# Patient Record
Sex: FEMALE | Race: WHITE | NOT HISPANIC OR LATINO | Employment: FULL TIME | URBAN - METROPOLITAN AREA
[De-identification: names, ages, dates, MRNs, and addresses within clinical notes are randomized per-mention and may not be internally consistent; named-entity substitution may affect disease eponyms.]

---

## 2017-01-10 ENCOUNTER — ALLSCRIPTS OFFICE VISIT (OUTPATIENT)
Dept: OTHER | Facility: OTHER | Age: 22
End: 2017-01-10

## 2017-01-10 ENCOUNTER — LAB REQUISITION (OUTPATIENT)
Dept: LAB | Facility: HOSPITAL | Age: 22
End: 2017-01-10
Payer: COMMERCIAL

## 2017-01-10 DIAGNOSIS — R87.612 LOW GRADE SQUAMOUS INTRAEPITHELIAL LESION ON CYTOLOGIC SMEAR OF CERVIX (LGSIL): ICD-10-CM

## 2017-01-10 PROCEDURE — 87624 HPV HI-RISK TYP POOLED RSLT: CPT | Performed by: OBSTETRICS & GYNECOLOGY

## 2017-01-10 PROCEDURE — 87623 HPV LOW-RISK TYPES: CPT | Performed by: OBSTETRICS & GYNECOLOGY

## 2017-01-11 LAB
HPV I/H RISK 1 DNA CVX QL PROBE+SIG AMP: POSITIVE
HPV LOW RISK DNA CVX QL PROBE+SIG AMP: NEGATIVE

## 2017-01-13 ENCOUNTER — GENERIC CONVERSION - ENCOUNTER (OUTPATIENT)
Dept: OTHER | Facility: OTHER | Age: 22
End: 2017-01-13

## 2017-04-14 ENCOUNTER — OFFICE VISIT (OUTPATIENT)
Dept: URGENT CARE | Facility: MEDICAL CENTER | Age: 22
End: 2017-04-14
Payer: COMMERCIAL

## 2017-04-14 PROCEDURE — 99213 OFFICE O/P EST LOW 20 MIN: CPT

## 2017-08-08 ENCOUNTER — LAB REQUISITION (OUTPATIENT)
Dept: LAB | Facility: HOSPITAL | Age: 22
End: 2017-08-08
Payer: COMMERCIAL

## 2017-08-08 ENCOUNTER — ALLSCRIPTS OFFICE VISIT (OUTPATIENT)
Dept: OTHER | Facility: OTHER | Age: 22
End: 2017-08-08

## 2017-08-08 DIAGNOSIS — Z32.01 ENCOUNTER FOR PREGNANCY TEST, RESULT POSITIVE: ICD-10-CM

## 2017-08-08 LAB
ABO GROUP BLD: NORMAL
B-HCG SERPL-ACNC: ABNORMAL MIU/ML
BLD GP AB SCN SERPL QL: NEGATIVE
PROGEST SERPL-MCNC: 12.5 NG/ML
RH BLD: POSITIVE
SPECIMEN EXPIRATION DATE: NORMAL

## 2017-08-08 PROCEDURE — 86850 RBC ANTIBODY SCREEN: CPT | Performed by: OBSTETRICS & GYNECOLOGY

## 2017-08-08 PROCEDURE — 86901 BLOOD TYPING SEROLOGIC RH(D): CPT | Performed by: OBSTETRICS & GYNECOLOGY

## 2017-08-08 PROCEDURE — 84144 ASSAY OF PROGESTERONE: CPT | Performed by: OBSTETRICS & GYNECOLOGY

## 2017-08-08 PROCEDURE — 86900 BLOOD TYPING SEROLOGIC ABO: CPT | Performed by: OBSTETRICS & GYNECOLOGY

## 2017-08-08 PROCEDURE — 84702 CHORIONIC GONADOTROPIN TEST: CPT | Performed by: OBSTETRICS & GYNECOLOGY

## 2017-08-09 ENCOUNTER — TRANSCRIBE ORDERS (OUTPATIENT)
Dept: ADMINISTRATIVE | Facility: HOSPITAL | Age: 22
End: 2017-08-09

## 2017-08-09 ENCOUNTER — GENERIC CONVERSION - ENCOUNTER (OUTPATIENT)
Dept: OTHER | Facility: OTHER | Age: 22
End: 2017-08-09

## 2017-08-09 DIAGNOSIS — O36.80X0 ULTRASOUND SCAN TO CONFIRM FETAL VIABILITY WITH HISTORY OF MISCARRIAGE: Primary | ICD-10-CM

## 2017-08-09 DIAGNOSIS — Z87.59 ULTRASOUND SCAN TO CONFIRM FETAL VIABILITY WITH HISTORY OF MISCARRIAGE: Primary | ICD-10-CM

## 2017-08-16 ENCOUNTER — HOSPITAL ENCOUNTER (OUTPATIENT)
Dept: ULTRASOUND IMAGING | Facility: MEDICAL CENTER | Age: 22
Discharge: HOME | End: 2017-08-16
Payer: COMMERCIAL

## 2017-08-16 DIAGNOSIS — O36.80X0 ULTRASOUND SCAN TO CONFIRM FETAL VIABILITY WITH HISTORY OF MISCARRIAGE: ICD-10-CM

## 2017-08-16 DIAGNOSIS — Z87.59 ULTRASOUND SCAN TO CONFIRM FETAL VIABILITY WITH HISTORY OF MISCARRIAGE: ICD-10-CM

## 2017-08-16 PROCEDURE — 76801 OB US < 14 WKS SINGLE FETUS: CPT

## 2017-08-21 ENCOUNTER — GENERIC CONVERSION - ENCOUNTER (OUTPATIENT)
Dept: OTHER | Facility: OTHER | Age: 22
End: 2017-08-21

## 2017-08-29 ENCOUNTER — LAB REQUISITION (OUTPATIENT)
Dept: LAB | Facility: HOSPITAL | Age: 22
End: 2017-08-29
Payer: COMMERCIAL

## 2017-08-29 DIAGNOSIS — Z34.91 ENCOUNTER FOR SUPERVISION OF NORMAL PREGNANCY IN FIRST TRIMESTER: ICD-10-CM

## 2017-08-29 LAB
ABO GROUP BLD: NORMAL
BASOPHILS # BLD AUTO: 0.02 THOUSANDS/ΜL (ref 0–0.1)
BASOPHILS NFR BLD AUTO: 0 % (ref 0–1)
BILIRUB UR QL STRIP: NEGATIVE
BLD GP AB SCN SERPL QL: NEGATIVE
CLARITY UR: CLEAR
COLOR UR: YELLOW
EOSINOPHIL # BLD AUTO: 0.15 THOUSAND/ΜL (ref 0–0.61)
EOSINOPHIL NFR BLD AUTO: 1 % (ref 0–6)
ERYTHROCYTE [DISTWIDTH] IN BLOOD BY AUTOMATED COUNT: 13.1 % (ref 11.6–15.1)
GLUCOSE UR STRIP-MCNC: NEGATIVE MG/DL
HBV SURFACE AG SER QL: NORMAL
HCT VFR BLD AUTO: 37.4 % (ref 34.8–46.1)
HGB BLD-MCNC: 12.3 G/DL (ref 11.5–15.4)
HGB UR QL STRIP.AUTO: NEGATIVE
KETONES UR STRIP-MCNC: NEGATIVE MG/DL
LEUKOCYTE ESTERASE UR QL STRIP: NEGATIVE
LYMPHOCYTES # BLD AUTO: 2.73 THOUSANDS/ΜL (ref 0.6–4.47)
LYMPHOCYTES NFR BLD AUTO: 26 % (ref 14–44)
MCH RBC QN AUTO: 30.8 PG (ref 26.8–34.3)
MCHC RBC AUTO-ENTMCNC: 32.9 G/DL (ref 31.4–37.4)
MCV RBC AUTO: 94 FL (ref 82–98)
MONOCYTES # BLD AUTO: 0.53 THOUSAND/ΜL (ref 0.17–1.22)
MONOCYTES NFR BLD AUTO: 5 % (ref 4–12)
NEUTROPHILS # BLD AUTO: 7 THOUSANDS/ΜL (ref 1.85–7.62)
NEUTS SEG NFR BLD AUTO: 68 % (ref 43–75)
NITRITE UR QL STRIP: NEGATIVE
NRBC BLD AUTO-RTO: 0 /100 WBCS
PH UR STRIP.AUTO: 7 [PH] (ref 4.5–8)
PLATELET # BLD AUTO: 240 THOUSANDS/UL (ref 149–390)
PMV BLD AUTO: 10.2 FL (ref 8.9–12.7)
PROT UR STRIP-MCNC: NEGATIVE MG/DL
RBC # BLD AUTO: 4 MILLION/UL (ref 3.81–5.12)
RH BLD: POSITIVE
RUBV IGG SERPL IA-ACNC: 87.5 IU/ML
SP GR UR STRIP.AUTO: 1.01 (ref 1–1.03)
SPECIMEN EXPIRATION DATE: NORMAL
UROBILINOGEN UR QL STRIP.AUTO: 0.2 E.U./DL
WBC # BLD AUTO: 10.45 THOUSAND/UL (ref 4.31–10.16)

## 2017-08-29 PROCEDURE — 87086 URINE CULTURE/COLONY COUNT: CPT | Performed by: NURSE PRACTITIONER

## 2017-08-29 PROCEDURE — 80081 OBSTETRIC PANEL INC HIV TSTG: CPT | Performed by: NURSE PRACTITIONER

## 2017-08-29 PROCEDURE — 81003 URINALYSIS AUTO W/O SCOPE: CPT | Performed by: NURSE PRACTITIONER

## 2017-08-30 LAB
BACTERIA UR CULT: NORMAL
HIV 1+2 AB+HIV1 P24 AG SERPL QL IA: NORMAL
RPR SER QL: NORMAL

## 2017-09-28 ENCOUNTER — GENERIC CONVERSION - ENCOUNTER (OUTPATIENT)
Dept: OTHER | Facility: OTHER | Age: 22
End: 2017-09-28

## 2017-09-28 PROCEDURE — 87624 HPV HI-RISK TYP POOLED RSLT: CPT | Performed by: OBSTETRICS & GYNECOLOGY

## 2017-09-28 PROCEDURE — G0145 SCR C/V CYTO,THINLAYER,RESCR: HCPCS | Performed by: OBSTETRICS & GYNECOLOGY

## 2017-09-29 ENCOUNTER — TRANSCRIBE ORDERS (OUTPATIENT)
Dept: ADMINISTRATIVE | Facility: HOSPITAL | Age: 22
End: 2017-09-29

## 2017-09-29 ENCOUNTER — LAB REQUISITION (OUTPATIENT)
Dept: LAB | Facility: HOSPITAL | Age: 22
End: 2017-09-29
Payer: COMMERCIAL

## 2017-09-29 DIAGNOSIS — Z34.91 ENCOUNTER FOR SUPERVISION OF NORMAL PREGNANCY IN FIRST TRIMESTER: ICD-10-CM

## 2017-09-29 DIAGNOSIS — R87.612 LOW GRADE SQUAMOUS INTRAEPITHELIAL LESION ON CYTOLOGIC SMEAR OF CERVIX (LGSIL): ICD-10-CM

## 2017-09-29 DIAGNOSIS — Z86.19 PERSONAL HISTORY OF OTHER INFECTIOUS AND PARASITIC DISEASES: ICD-10-CM

## 2017-09-29 PROCEDURE — 87591 N.GONORRHOEAE DNA AMP PROB: CPT | Performed by: OBSTETRICS & GYNECOLOGY

## 2017-09-29 PROCEDURE — 87491 CHLMYD TRACH DNA AMP PROBE: CPT | Performed by: OBSTETRICS & GYNECOLOGY

## 2017-10-05 LAB
CHLAMYDIA DNA CVX QL NAA+PROBE: NORMAL
N GONORRHOEA DNA GENITAL QL NAA+PROBE: NORMAL

## 2017-10-06 LAB — HPV RRNA GENITAL QL NAA+PROBE: NORMAL

## 2017-10-18 LAB
LAB AP GYN PRIMARY INTERPRETATION: NORMAL
Lab: NORMAL

## 2017-10-20 ENCOUNTER — GENERIC CONVERSION - ENCOUNTER (OUTPATIENT)
Dept: OTHER | Facility: OTHER | Age: 22
End: 2017-10-20

## 2017-11-02 ENCOUNTER — GENERIC CONVERSION - ENCOUNTER (OUTPATIENT)
Dept: OTHER | Facility: OTHER | Age: 22
End: 2017-11-02

## 2017-11-03 ENCOUNTER — GENERIC CONVERSION - ENCOUNTER (OUTPATIENT)
Dept: OTHER | Facility: OTHER | Age: 22
End: 2017-11-03

## 2017-11-06 ENCOUNTER — GENERIC CONVERSION - ENCOUNTER (OUTPATIENT)
Dept: OTHER | Facility: OTHER | Age: 22
End: 2017-11-06

## 2017-11-06 ENCOUNTER — ALLSCRIPTS OFFICE VISIT (OUTPATIENT)
Dept: PERINATAL CARE | Facility: CLINIC | Age: 22
End: 2017-11-06
Payer: COMMERCIAL

## 2017-11-06 PROCEDURE — 76817 TRANSVAGINAL US OBSTETRIC: CPT | Performed by: OBSTETRICS & GYNECOLOGY

## 2017-11-06 PROCEDURE — 76811 OB US DETAILED SNGL FETUS: CPT | Performed by: OBSTETRICS & GYNECOLOGY

## 2017-11-28 ENCOUNTER — GENERIC CONVERSION - ENCOUNTER (OUTPATIENT)
Dept: OTHER | Facility: OTHER | Age: 22
End: 2017-11-28

## 2017-11-28 ENCOUNTER — APPOINTMENT (OUTPATIENT)
Dept: PERINATAL CARE | Facility: CLINIC | Age: 22
End: 2017-11-28
Payer: COMMERCIAL

## 2017-11-28 PROCEDURE — 76816 OB US FOLLOW-UP PER FETUS: CPT | Performed by: OBSTETRICS & GYNECOLOGY

## 2017-11-29 ENCOUNTER — GENERIC CONVERSION - ENCOUNTER (OUTPATIENT)
Dept: OTHER | Facility: OTHER | Age: 22
End: 2017-11-29

## 2017-12-26 ENCOUNTER — LAB REQUISITION (OUTPATIENT)
Dept: LAB | Facility: HOSPITAL | Age: 22
End: 2017-12-26
Payer: COMMERCIAL

## 2017-12-26 ENCOUNTER — GENERIC CONVERSION - ENCOUNTER (OUTPATIENT)
Dept: OTHER | Facility: OTHER | Age: 22
End: 2017-12-26

## 2017-12-26 DIAGNOSIS — Z34.92 ENCOUNTER FOR SUPERVISION OF NORMAL PREGNANCY IN SECOND TRIMESTER: ICD-10-CM

## 2017-12-26 LAB
BASOPHILS # BLD AUTO: 0.02 THOUSANDS/ΜL (ref 0–0.1)
BASOPHILS NFR BLD AUTO: 0 % (ref 0–1)
EOSINOPHIL # BLD AUTO: 0.11 THOUSAND/ΜL (ref 0–0.61)
EOSINOPHIL NFR BLD AUTO: 1 % (ref 0–6)
ERYTHROCYTE [DISTWIDTH] IN BLOOD BY AUTOMATED COUNT: 13.1 % (ref 11.6–15.1)
GLUCOSE 1H P 50 G GLC PO SERPL-MCNC: 156 MG/DL
HCT VFR BLD AUTO: 34.2 % (ref 34.8–46.1)
HGB BLD-MCNC: 11.6 G/DL (ref 11.5–15.4)
LYMPHOCYTES # BLD AUTO: 1.59 THOUSANDS/ΜL (ref 0.6–4.47)
LYMPHOCYTES NFR BLD AUTO: 16 % (ref 14–44)
MCH RBC QN AUTO: 30.3 PG (ref 26.8–34.3)
MCHC RBC AUTO-ENTMCNC: 33.9 G/DL (ref 31.4–37.4)
MCV RBC AUTO: 89 FL (ref 82–98)
MONOCYTES # BLD AUTO: 0.36 THOUSAND/ΜL (ref 0.17–1.22)
MONOCYTES NFR BLD AUTO: 4 % (ref 4–12)
NEUTROPHILS # BLD AUTO: 7.78 THOUSANDS/ΜL (ref 1.85–7.62)
NEUTS SEG NFR BLD AUTO: 79 % (ref 43–75)
NRBC BLD AUTO-RTO: 0 /100 WBCS
PLATELET # BLD AUTO: 178 THOUSANDS/UL (ref 149–390)
PMV BLD AUTO: 9.3 FL (ref 8.9–12.7)
RBC # BLD AUTO: 3.83 MILLION/UL (ref 3.81–5.12)
WBC # BLD AUTO: 9.93 THOUSAND/UL (ref 4.31–10.16)

## 2017-12-26 PROCEDURE — 82950 GLUCOSE TEST: CPT | Performed by: OBSTETRICS & GYNECOLOGY

## 2017-12-26 PROCEDURE — 85025 COMPLETE CBC W/AUTO DIFF WBC: CPT | Performed by: OBSTETRICS & GYNECOLOGY

## 2018-01-08 ENCOUNTER — GENERIC CONVERSION - ENCOUNTER (OUTPATIENT)
Dept: OTHER | Facility: OTHER | Age: 23
End: 2018-01-08

## 2018-01-08 DIAGNOSIS — Z34.93 ENCOUNTER FOR SUPERVISION OF NORMAL PREGNANCY IN THIRD TRIMESTER: ICD-10-CM

## 2018-01-10 ENCOUNTER — HOSPITAL ENCOUNTER (EMERGENCY)
Facility: HOSPITAL | Age: 23
Discharge: HOME | End: 2018-01-10
Attending: EMERGENCY MEDICINE | Admitting: EMERGENCY MEDICINE
Payer: COMMERCIAL

## 2018-01-10 VITALS
HEART RATE: 102 BPM | DIASTOLIC BLOOD PRESSURE: 68 MMHG | WEIGHT: 180 LBS | SYSTOLIC BLOOD PRESSURE: 127 MMHG | RESPIRATION RATE: 16 BRPM | TEMPERATURE: 98.2 F | OXYGEN SATURATION: 99 %

## 2018-01-10 DIAGNOSIS — Z3A.28 28 WEEKS GESTATION OF PREGNANCY: ICD-10-CM

## 2018-01-10 DIAGNOSIS — R11.0 NAUSEA: ICD-10-CM

## 2018-01-10 DIAGNOSIS — T50.Z95A VACCINATION REACTION, INITIAL ENCOUNTER: Primary | ICD-10-CM

## 2018-01-10 LAB
BACTERIA UR QL AUTO: ABNORMAL /HPF
BILIRUB UR QL STRIP: NEGATIVE
CLARITY UR: CLEAR
CLARITY, POC: CLEAR
COLOR UR: YELLOW
COLOR, POC: YELLOW
FLUAV AG SPEC QL IA: NEGATIVE
FLUAV AG SPEC QL: NORMAL
FLUBV AG SPEC QL IA: NEGATIVE
FLUBV AG SPEC QL: NORMAL
GLUCOSE UR STRIP-MCNC: NEGATIVE MG/DL
HGB UR QL STRIP.AUTO: ABNORMAL
KETONES UR STRIP-MCNC: NEGATIVE MG/DL
LEUKOCYTE ESTERASE UR QL STRIP: NEGATIVE
NITRITE UR QL STRIP: NEGATIVE
NON-SQ EPI CELLS URNS QL MICRO: ABNORMAL /HPF
PH UR STRIP.AUTO: 6.5 [PH] (ref 4.5–8)
PROT UR STRIP-MCNC: NEGATIVE MG/DL
RBC #/AREA URNS AUTO: ABNORMAL /HPF
RSV B RNA SPEC QL NAA+PROBE: NORMAL
SP GR UR STRIP.AUTO: 1.01 (ref 1–1.03)
UROBILINOGEN UR QL STRIP.AUTO: 0.2 E.U./DL
WBC #/AREA URNS AUTO: ABNORMAL /HPF

## 2018-01-10 PROCEDURE — 81001 URINALYSIS AUTO W/SCOPE: CPT

## 2018-01-10 PROCEDURE — 87798 DETECT AGENT NOS DNA AMP: CPT | Performed by: NURSE PRACTITIONER

## 2018-01-10 PROCEDURE — 87400 INFLUENZA A/B EACH AG IA: CPT | Performed by: NURSE PRACTITIONER

## 2018-01-10 PROCEDURE — 81002 URINALYSIS NONAUTO W/O SCOPE: CPT | Performed by: NURSE PRACTITIONER

## 2018-01-10 PROCEDURE — 99283 EMERGENCY DEPT VISIT LOW MDM: CPT

## 2018-01-10 RX ORDER — ONDANSETRON 4 MG/1
4 TABLET, ORALLY DISINTEGRATING ORAL ONCE
Status: COMPLETED | OUTPATIENT
Start: 2018-01-10 | End: 2018-01-10

## 2018-01-10 RX ORDER — ACETAMINOPHEN 325 MG/1
650 TABLET ORAL ONCE
Status: COMPLETED | OUTPATIENT
Start: 2018-01-10 | End: 2018-01-10

## 2018-01-10 RX ORDER — ONDANSETRON 4 MG/1
4 TABLET, FILM COATED ORAL EVERY 8 HOURS PRN
Qty: 6 TABLET | Refills: 0 | Status: SHIPPED | OUTPATIENT
Start: 2018-01-10 | End: 2018-02-09

## 2018-01-10 RX ADMIN — ACETAMINOPHEN 650 MG: 325 TABLET, FILM COATED ORAL at 01:56

## 2018-01-10 RX ADMIN — ONDANSETRON 4 MG: 4 TABLET, ORALLY DISINTEGRATING ORAL at 02:34

## 2018-01-10 NOTE — ED PROVIDER NOTES
History  Chief Complaint   Patient presents with    Nausea     pt is 28 wks pregnant andc/o nausea  pt denies any vomiting and or diarrhea     This is a 23year old female who is approx 28 weeks pregnant and went to the [facility] office on Monday and received a "whooping cough shot"  She states that she developed muscle aches, chills  She states she last took tylenol around 6pm yesterday  She denies diarrhea, vomiting   + nausea  Denies abdominal pain, vaginal bleeding or discharge  History provided by:  Patient and medical records   used: No    Nausea   The primary symptoms include fatigue, nausea and myalgias  The illness began 2 days ago  The onset was gradual  The problem has been gradually worsening  None       History reviewed  No pertinent past medical history  Past Surgical History:   Procedure Laterality Date    NO PAST SURGERIES         History reviewed  No pertinent family history  I have reviewed and agree with the history as documented  Social History   Substance Use Topics    Smoking status: Never Smoker    Smokeless tobacco: Never Used    Alcohol use No        Review of Systems   Constitutional: Positive for fatigue  HENT: Negative  Eyes: Negative  Respiratory: Negative  Cardiovascular: Negative  Gastrointestinal: Positive for nausea  Endocrine: Negative  Genitourinary: Negative  Musculoskeletal: Positive for myalgias  Skin: Negative  Allergic/Immunologic: Negative  Neurological: Negative  Hematological: Negative  Psychiatric/Behavioral: Negative          Physical Exam  ED Triage Vitals   Temperature Pulse Respirations Blood Pressure SpO2   01/10/18 0041 01/10/18 0041 01/10/18 0111 01/10/18 0041 01/10/18 0041   98 2 °F (36 8 °C) (!) 112 16 127/68 98 %      Temp Source Heart Rate Source Patient Position - Orthostatic VS BP Location FiO2 (%)   01/10/18 0041 01/10/18 0111 01/10/18 0111 01/10/18 0111 --   Oral Monitor Sitting Right arm       Pain Score       01/10/18 0111       8           Orthostatic Vital Signs  Vitals:    01/10/18 0041 01/10/18 0111 01/10/18 0232   BP: 127/68 127/68    Pulse: (!) 112 (!) 112 102   Patient Position - Orthostatic VS:  Sitting        Physical Exam   Constitutional: She is oriented to person, place, and time  She appears well-developed and well-nourished  No distress  HENT:   Head: Normocephalic and atraumatic  Right Ear: External ear normal    Left Ear: External ear normal    Nose: Nose normal    Mouth/Throat: Oropharynx is clear and moist  No oropharyngeal exudate  Eyes: EOM are normal  Pupils are equal, round, and reactive to light  Neck: Normal range of motion  Neck supple  Cardiovascular: Normal rate, regular rhythm and normal heart sounds  Pulmonary/Chest: Effort normal and breath sounds normal  No respiratory distress  She has no wheezes  She has no rales  She exhibits no tenderness  Abdominal: Soft  Bowel sounds are normal  She exhibits no distension  There is no tenderness  Musculoskeletal: Normal range of motion  Neurological: She is alert and oriented to person, place, and time  Skin: Skin is warm and dry  Capillary refill takes less than 2 seconds  She is not diaphoretic  Psychiatric: She has a normal mood and affect  Her behavior is normal  Judgment and thought content normal    Nursing note and vitals reviewed        ED Medications  Medications   acetaminophen (TYLENOL) tablet 650 mg (650 mg Oral Given 1/10/18 0156)   ondansetron (ZOFRAN-ODT) dispersible tablet 4 mg (4 mg Oral Given 1/10/18 0234)       Diagnostic Studies  Results Reviewed     Procedure Component Value Units Date/Time    Urine Microscopic [[id number]]  (Abnormal) Collected:  01/10/18 0216    Lab Status:  Final result Specimen:  Urine from Urine, Clean Catch Updated:  01/10/18 0229     RBC, UA 0-1 (A) /hpf      WBC, UA 0-1 (A) /hpf      Epithelial Cells Moderate (A) /hpf      Bacteria, UA Occasional /hpf     Rapid Influenza Screen with Reflex PCR (indicated for patients <2mo of age) [[id number]]  (Normal) Collected:  01/10/18 0158    Lab Status:  Final result Specimen:  Nasopharyngeal from Nasopharyngeal Swab Updated:  01/10/18 0224     Rapid Influenza A Ag Negative     Rapid Influenza B Ag Negative    Influenza A/B and RSV by PCR (Indicated for patients > 2 mo of age) [[id number]] Collected:  01/10/18 0158    Lab Status: In process Specimen:  Nasopharyngeal from Nasopharyngeal Swab Updated:  01/10/18 0224    POCT urinalysis dipstick [[id number]]  (Abnormal) Resulted:  01/10/18 0218    Lab Status:  Final result Specimen:  Urine Updated:  01/10/18 0218     Color, UA yellow     Clarity, UA clear    ED Urine Macroscopic [[id number]]  (Abnormal) Collected:  01/10/18 0216    Lab Status:  Final result Specimen:  Urine Updated:  01/10/18 0218     Color, UA Yellow     Clarity, UA Clear     pH, UA 6 5     Leukocytes, UA Negative     Nitrite, UA Negative     Protein, UA Negative mg/dl      Glucose, UA Negative mg/dl      Ketones, UA Negative mg/dl      Urobilinogen, UA 0 2 E U /dl      Bilirubin, UA Negative     Blood, UA Small (A)     Specific [place], UA 1 015    Narrative:       CLINITEK RESULT                 No orders to display              Procedures  Procedures       Phone Contacts  ED Phone Contact    ED Course  ED Course as of Dedrick 10 0240   Wed Dedrick 10, 2018   0218 Urine negative for infection  Waiting on influenza  OB called for consult  0226 Influenza negative  Spoke with Selena from [facility] (resident) he feels this is related to Tdap injection  Pt may follow up with PCP for this  Zofran ok to give pt now and at d/c  Pt verbalizes understanding of d/c instructions and follow up      0229 T 154                                    MDM  Number of Diagnoses or Management Options  Diagnosis management comments: Differential diagnosis:  Tdap reaction, UTI, influenza    Urine  Influenza swab  zofran         Amount and/or Complexity of Data Reviewed  Clinical lab tests: ordered and reviewed  Review and summarize past medical records: yes      CritCare Time    Disposition  Final diagnoses:   Vaccination reaction, initial encounter   Nausea   28 weeks gestation of pregnancy     Time reflects when diagnosis was documented in both MDM as applicable and the Disposition within this note     Time User Action Codes Description Comment    1/10/2018  2:27 AM [facility] [place] Add [T50  Z95A] Vaccination reaction, initial encounter     1/10/2018  2:27 AM [facility] [place] Add [R11 0] Nausea     1/10/2018  2:27 AM [facility] [place] Add [Z3A 28] 28 weeks gestation of pregnancy       ED Disposition     ED Disposition Condition Comment    Discharge  Lowella Jina discharge to home/self care  Condition at discharge: Good        Follow-up Information     Follow up With Specialties Details Why Contact Info Robbin Martin 25, DO Family Medicine Schedule an appointment as soon as possible for a visit in 1 day  [address] [facility] [address] [place] [address] Obstetrics and Gynecology Schedule an appointment as soon as possible for a visit  Jacob  [phone]  [address] [facility] Emergency Department Emergency Medicine  If symptoms worsen [address] [facility] ED, [address], [place], [address]        Discharge Medication List as of 1/10/2018  2:29 AM      START taking these medications    Details   ondansetron (ZOFRAN) 4 mg tablet Take 1 tablet by mouth every 8 (eight) hours as needed for nausea or vomiting, Starting Wed 1/10/2018, Print           No discharge procedures on file      ED Provider  Electronically Signed by           Gia Liriano  01/10/18 4435

## 2018-01-10 NOTE — DISCHARGE INSTRUCTIONS
Your influenza and urine testing was negative  Your symptoms appear to be from the injection you were given  You are to take tylenol for pain  Apply ice  See your PCP  You may take the zofran for nausea  Follow up with OB    Cold Compress or Soak   WHAT YOU NEED TO KNOW:   A cold compress or soak helps relieve pain, swelling, and itching  You may need a cold compress or soak to help manage any of the following:  · A sunburn    · Poison ivy or poison oak    · A skin rash    · A bite or sting by an insect or jellyfish    · A muscle or joint injury, such as a sprain    · A high fever  DISCHARGE INSTRUCTIONS:   Contact your healthcare provider if:   · Your symptoms do not improve or you have new symptoms  · You have questions or concerns about your condition or care  How to prepare and use a moist cold compress: Your healthcare provider will tell you how often to apply a cold compress:  · Wash your hands  · Use a washcloth, small towel, or gauze as a cold compress  · You can place the compress under running water or place it in a bowl with cold water  Squeeze extra water out of the compress  · Place the compress directly on the area  · Remove the compress in 10 to 15 minutes or as directed  Gently pat your skin dry with a clean towel  · Wash your hands  · Reapply the compress as many times as directed each day  Use a clean compress every time  How to use a dry cold compress: An ice pack, bag of ice, or bottle filled with cold water can be used as a dry compress  Cover the ice pack or bag of ice with a towel before you apply it to your skin  Leave the compress on your skin for 15 to 20 minutes or as directed  Your healthcare provider will tell you how often to apply the compress each day  How to prepare and use a cold soak:   · Fill a clean container or tub with cold water  The container should be deep enough to cover the area completely  · Remove any bandages       · Soak the area for no longer than 10 minutes  Gently pat your skin dry when you are done soaking  · Replace bandages as directed  · Clean the container or tub when finished  · Wash your hands  Follow up with your healthcare provider as directed:  Write down your questions so you remember to ask them during your visits  © 2017 [address] [employer] Information is for End User's use only and may not be sold, redistributed or otherwise used for commercial purposes  All illustrations and images included in CareNotes® are the copyrighted property of [employer] , [employer]  or Sincere Kennedy  The above information is an  only  It is not intended as medical advice for individual conditions or treatments  Talk to your doctor, nurse or pharmacist before following any medical regimen to see if it is safe and effective for you

## 2018-01-10 NOTE — ED NOTES
OB was called and asked if they wanted to see pt upstairs; OB declined from Dr Antoinette Story who said pt should be seen in our ED department     Mariya Nguyen, SERENE  01/10/18 7803

## 2018-01-10 NOTE — PROGRESS NOTES
Chief Complaint  The patient came in today for her first blood draw for pregnancy and is still having issues with the abdominal cramping  She called yesterday and stated that is all over her lower abdomen and that it sometimes wakes her up  I spoke with Dr Daniela Spencer and she stated to use Tylenol and a heating pad for the pain and to call if there were changes  She stated today it is about the same but she did note some issues with constipation and then issues with loose bowel movements  Her lmp was 6/28/2017 she is about 5w6d  I spoke with Dr Dexter Hammans and she stated to use Tylenol again and that if she doubles over from the pain she is to go to the ER and we will await the results of the blood test       Active Problems    1  Abnormal cervical Papanicolaou smear, unspecified abnormal pap finding (795 00)   (R87 619)   2  Acute otitis media (382 9) (H66 90)   3  Dysmenorrhea (625 3) (N94 6)   4  Encounter for cervical Pap smear with pelvic exam (V76 2,V72 31) (Z01 419)   5  Encounter for routine gynecological examination (V72 31) (Z01 419)   6  Encounter for routine gynecological examination with Papanicolaou smear of cervix   (V72 31,V76 2) (Z01 419)   7  Pap smear abnormality of cervix with LGSIL (795 03) (R87 612)   8  Right conjunctivitis (372 30) (H10 9)   9  Right otitis media (382 9) (H66 91)   10  Shoulder injury (959 2) (S49 90XA)   11  Shoulder sprain (840 9) (S43 409A)    Current Meds   1  Bromfed DM 30-2-10 MG/5ML Oral Syrup; 5-10ML at bedtime; Therapy: 55Thb4072 to (Last Rx:14Apr2017)  Requested for: 14Apr2017 Ordered    Allergies    1  No Known Drug Allergies    Plan  Positive urine pregnancy test    · (1) HCG QUANT; Status:Active - Retrospective By Protocol Authorization; Requested  for:26Thj4454;    · (1) PROGESTERONE; Status:Active - Retrospective By Protocol Authorization;   Requested for:66Cxo1502;    · (1) TYPE & SCREEN; Status:Active - Retrospective By Protocol Authorization; Requested  for:00Slb0847;   currently receiving a biologic? : No  the patient pregnant or have they been in the last 90 days? : Yes  the patient been transfused in the last 3 months? : No  or PreOp : Medical    Future Appointments    Date/Time Provider Specialty Site   08/10/2017 08:30 AM Nurse Elysia Schedule  OB [facility]     Signatures   Electronically signed by :  ESTHELA Baca ; Aug  8 2017  2:21PM EST                       (Author)

## 2018-01-12 VITALS
WEIGHT: 180 LBS | SYSTOLIC BLOOD PRESSURE: 116 MMHG | HEIGHT: 64 IN | BODY MASS INDEX: 30.73 KG/M2 | DIASTOLIC BLOOD PRESSURE: 81 MMHG

## 2018-01-13 NOTE — MISCELLANEOUS
Message  Message Free Text Note Form: pt  came in and explained high risk HPV and to repeat pap and HPV December, 2017; gave her pamphlet about HPV      Signatures   Electronically signed by : Yang Munoz DO; Jan 13 2017  2:16PM EST                       (Author)

## 2018-01-14 VITALS
SYSTOLIC BLOOD PRESSURE: 124 MMHG | BODY MASS INDEX: 30.64 KG/M2 | TEMPERATURE: 98.7 F | DIASTOLIC BLOOD PRESSURE: 78 MMHG | HEIGHT: 64 IN | OXYGEN SATURATION: 93 % | WEIGHT: 179.5 LBS | RESPIRATION RATE: 22 BRPM

## 2018-01-16 NOTE — RESULT NOTES
Verified Results  (1) THIN PREP PAP WITH IMAGING [id number] 11:52AM Vladimir Mendoza     Test Name Result Flag Reference   LAB AP CASE REPORT (Report)     Gynecologic Cytology Report            Case: [id number]                  Authorizing Provider: Ambrose Schlatter, MD     Collected:      09/28/2017           First Screen:     ARACELIS Centeno    Received:      10/05/2017 1026        Specimen:  LIQUID-BASED PAP, SCREENING, Endocervical   HPV HIGH RISK RESULT (Report)     HPV, High Risk: HPV NEG, HPV16 NEG, HPV18 NEG      Other High Risk HPV Negative, HPV 16 Negative, HPV 18 Negative  HPV types: 16,18,31,33,35,39,45,51,52,56,58,59,66 and 68 DNA are undetectable or below the pre-set threshold  [employer] FDA approved Teena 4800 is utilized with strict adherence to the manufacturers instruction  manual to test for the presence of High-Risk HPV DNA, as well as HPV 16 and HPV 18  This instrument  has been validated by our laboratory and/or by the   A negative result does not preclude the presence of HPV infection because results depend on adequate  specimen collection, absence of inhibitors and sufficient DNA to be detected  Additionally, HPV negative  results are not intended to prevent women from proceeding to colposcopy if clinically warranted  Positive HPV test results indicate the presence of any one or more of the high risk types, but since patients  are often co-infected with low-risk types it does not rule out the presence of low-risk types in patients  with mixed infections  LAB AP GYN PRIMARY INTERPRETATION      Negative for intraepithelial lesion or malignancy  Electronically signed by ARACELIS Centeno on 10/18/2017 at 11:52 AM   LAB AP GYN SPECIMEN ADEQUACY      Satisfactory for evaluation  Absence of endocervical/transformation zone component     LAB AP GYN ADDITIONAL INFORMATION (Report)     [employer]'s FDA approved ,  and ThinPrep Imaging System are   utilized with strict adherence to the 's instruction manual to   prepare gynecologic and non-gynecologic cytology specimens for the   production of ThinPrep slides as well as for gynecologic ThinPrep imaging  These processes have been validated by our laboratory and/or by the     The Pap test is not a diagnostic procedure and should not be used as the   sole means to detect cervical cancer  It is only a screening procedure to   aid in the detection of cervical cancer and its precursors  Both   false-negative and false-positive results have been experienced  Your   patient's test result should be interpreted in this context together with   the history and clinical findings

## 2018-01-16 NOTE — PROGRESS NOTES
2017         RE: Verner Mascot                                To: Ob[facility]Gyn Care   [facility]   MR#: [id number]                                    [address] [facility] [address]   : [facility], [address] [place] [address] Street: [phone]                             Fax: [phone]   (Exam #: [id number])      The LMP of this 23year old,  G1, P0-0-0-0 patient was 2017, giving   her an JORDAN of 2018 and a current gestational age of 22 weeks 6 days   by dates  A sonographic examination was performed on 2017 using   real time equipment  The ultrasound examination was performed using   abdominal technique  The patient has a BMI of 31 4  Her blood pressure   today was 107/73  Earliest US on record: 17  7w2d  JORDAN 18      Problem list   1  Smoker   2  Increased BMI   3  Declined genetic screening      Cardiac motion was observed at 154 bpm       INDICATIONS      missed anatomy follow up      Exam Types      Level I      RESULTS      Fetus # 1 of 1   Breech presentation   Placenta Location = Anterior   No placenta previa   Placenta Grade = 0      AMNIOTIC FLUID         Largest Vertical Pocket = 7 0 cm   Amniotic Fluid: Normal      ANATOMY COMMENTS      The prior fetal anatomic survey was limited in the area of the lateral   ventricles, nose/lips, profile and three vessel trachea views which were   seen today and appear normal  The prior US was limited in the area of the   four chamber heart, short axis, ductal arch, LVOT and RVOT which is still   limited on todays ultrasound due to fetal position  By color doppler the   LVOT and RVOT appear normal in their orientation  IMPRESSION      Cole IUP   Breech presentation   Regular fetal heart rate of 154 bpm   Anterior placenta   No placenta previa      GENERAL COMMENT      Recommend a 32 week growth scan and review of the missed anatomy     She reports she received a flu shot through her PCP  Dr Maryjo Balint Lilla Konig, R D M S Marylen Bryant, M D     Maternal-Fetal Medicine   Electronically signed 11/28/17 11:42

## 2018-01-17 NOTE — RESULT NOTES
Verified Results  (1) HCG QUANT [id number] 05:37PM Daphne Melendez     Test Name Result Flag Reference   HCG QUANTITATIVE 36177 mIU/mL H <=6   Expected Ranges:     Approximate               Approximate HCG  Gestation age          Concentration ( mIU/mL)  _____________          ______________________   Artie Fossa                      HCG values  0 2-1                       5-50  1-2                           2-3                         100-5000  3-4                         500-69860  4-5                         1000-82931  5-6                         [id number]  6-8                         [id number]  8-12                        [id number]

## 2018-01-18 NOTE — MISCELLANEOUS
Message  Return to work or school:   Angelina Woodruff is under my professional care  She was seen in my office on 06/13/2016   She is able to return to work on  06/14/2016       Александр Arreguin PA-C        Signatures   Electronically signed by : Kenji Castro; Jun 13 2016 10:00AM EST                       (Author)    Electronically signed by : Kejni Castro; Jun 13 2016 11:11AM EST                       (Author)

## 2018-01-18 NOTE — PROGRESS NOTES
2017         RE: Indira Rodriguez                                To: Ob/Gyn Care   [facility]   MR#: [id number]                                    [address] [facility] [address]   : 1 [facility], [address] [place] [address] Street: [id number]                             Fax: [phone]   (Exam #: [id number])      The LMP of this 23year old,  G1, P0-0-0-0 patient was 2017, giving   her an JORDAN of 2018 and a current gestational age of 22 weeks 5 days   by dates  A sonographic examination was performed on 2017 using real   time equipment  The ultrasound examination was performed using abdominal &   vaginal techniques  The patient has a BMI of 30 9  Her blood pressure   today was 116/81  Earliest US on record: 17  7w2d  JORDAN 18      Thank you very much for referring for referring this very nice patient for   a fetal anatomic evaluation  This is the patient's first pregnancy  She   has no significant medical history  Her surgical history is significant   for ear surgery  She denies the current use of tobacco, alcohol, or   drugs  She currently takes Zyrtec and has no known drug allergies  Her   family medical history is unremarkable  A review of systems is otherwise   negative  On exam, the patient appears well, in no acute distress, and her   abdomen is nontender        Cardiac motion was observed at 151 bpm       INDICATIONS      fetal anatomical survey      Exam Types      LEVEL II   Transvaginal      RESULTS      Fetus # 1 of 1   Vertex presentation   Fetal growth appeared normal   Placenta Location = Anterior   No placenta previa   Placenta Grade = I      MEASUREMENTS (* Included In Average GA)      AC              14 0 cm        19 weeks 0 days* (60%)   BPD              4 3 cm        19 weeks 1 day * (62%)   HC              16 3 cm        19 weeks 0 days* (56%)   Femur            2 8 cm        18 weeks 5 days* (41%) Nuchal Fold      4 6 mm   NBL              5 9 mm      Humerus          2 9 cm        19 weeks 4 days  (70%)      Cerebellum       1 9 cm        19 weeks 1 day   Biorbit          2 9 cm        18 weeks 6 days   CisternaMagna    3 4 mm      HC/AC           1 17   FL/AC           0 20   FL/BPD          0 64   EFW (Ac/Fl/Hc)   266 grams - 0 lbs 9 oz      THE AVERAGE GESTATIONAL AGE is 19 weeks 0 days +/- 10 days  AMNIOTIC FLUID         Largest Vertical Pocket = 4 7 cm   Amniotic Fluid: Normal      CERVICAL EVALUATION      The cervix appeared normal (Ultrasound Examination)  SUPINE      Cervical Length: 3 20 cm      OTHER TEST RESULTS           Funneling?: No             Dynamic Changes?: No        Resp  To TFP?: No      ANATOMY      Head                                    See Details   Face/Neck                               See Details   Th  Cav  Normal   Heart                                   See Details   Abd  Cav  Normal   Stomach                                 Normal   Right Kidney                            Normal   Left Kidney                             Normal   Bladder                                 Normal   Abd  Wall                               Normal   Spine                                   Normal   Extrems                                 Normal   Genitalia                               Normal   Placenta                                Normal   Umbl  Cord                              Normal   Uterus                                  Normal   PCI                                     Normal      ANATOMY DETAILS      Visualized Appearing Sonographically Normal:   HEAD: (Calvarium, BPD Level, Cavum, Choroid Plexus, Cerebellum, Cisterna   Magna);    FACE/NECK: (Neck, Nuchal Fold, Orbits, Palate);    TH  CAV  :   (Lungs, Diaphragm);     HEART: (3VV, Aortic Arch, Interventricular Septum,   Interatrial Septum, IVC, SVC, Cardiac Axis, Cardiac Position);    ABD    CAV : (Liver);    STOMACH, RIGHT KIDNEY, LEFT KIDNEY, BLADDER, ABD  WALL,   SPINE: (Cervical Spine, Thoracic Spine, Lumbar Spine, Sacrum);    EXTREMS:   (Lt Humerus, Rt Humerus, Lt Forearm, Rt Forearm, Lt Hand, Rt Hand, Lt   Femur, Rt Femur, Lt Low Leg, Rt Low Leg, Lt Foot, Rt Foot);    GENITALIA,   PLACENTA, UMBL  CORD, UTERUS, PCI      Suboptimally Visualized:   HEAD: (Lateral Ventricles);    FACE/NECK: (Profile); HEART: (Four   Chamber View, 3 Vessel Trachea)      Not Visualized:   FACE/NECK: (Nose/Lips, Face); HEART: (Proximal Left Outflow, Proximal   Right Outflow, Short Axis of Greater Vessels, Ductal Arch)      ADNEXA      The left ovary appeared normal and measured 2 2 x 1 9 x 1 2 cm with a   volume of 2 6 cc  The right ovary was not visualized  IMPRESSION      Cole IUP   19 weeks and 0 days by this ultrasound  (JORDAN=APR 2 2018)   Vertex presentation   Fetal growth appeared normal   Regular fetal heart rate of 151 bpm   Anterior placenta   No placenta previa      GENERAL COMMENT      The patient has declined genetic screening, and we discussed that a normal   ultrasound does not exclude all congenital birth defects or karyotypic   abnormalities  Today's scan is limited by fetal position; therefore, the fetal anatomic   survey could not be completed  Good fetal movement and tone are seen  The amniotic fluid volume appears normal   The placenta is anterior and it   appears sonographically normal   A transvaginal ultrasound was performed   to assess the cervix, which was not seen well transabdominally  The   cervical length was 3 2 centimeters, which is normal for the current   gestational age  There was no significant funneling or dynamic changes   appreciated  The limitations of ultrasound were reviewed with the patient,   which she appears to understand and accepts  She was informed of today's   findings and all of her questions were answered        Recommend patient return in 3 weeks to our Center in order to complete the   fetal anatomic survey  Precautions were reviewed  Please note, in addition to the time spent discussing the results of the   ultrasound, I spent approximately 10 minutes of face-to-face time with the   patient, greater than 50% of which was spent in counseling and the   coordination of care for this patient  Thank you very much for allowing us to participate in the care of this   very nice patient  Should you have any questions, please do not hesitate   to contact our office  AMIE Murguia M D     Electronically signed 11/07/17 07:53

## 2018-01-22 VITALS — DIASTOLIC BLOOD PRESSURE: 73 MMHG | WEIGHT: 183 LBS | BODY MASS INDEX: 31.41 KG/M2 | SYSTOLIC BLOOD PRESSURE: 103 MMHG

## 2018-01-22 VITALS
HEIGHT: 64 IN | DIASTOLIC BLOOD PRESSURE: 66 MMHG | WEIGHT: 186.56 LBS | SYSTOLIC BLOOD PRESSURE: 102 MMHG | BODY MASS INDEX: 31.85 KG/M2

## 2018-01-22 VITALS — SYSTOLIC BLOOD PRESSURE: 104 MMHG | DIASTOLIC BLOOD PRESSURE: 56 MMHG | BODY MASS INDEX: 30.9 KG/M2 | WEIGHT: 180 LBS

## 2018-01-22 VITALS — DIASTOLIC BLOOD PRESSURE: 60 MMHG | SYSTOLIC BLOOD PRESSURE: 122 MMHG | BODY MASS INDEX: 30.21 KG/M2 | WEIGHT: 176 LBS

## 2018-01-24 ENCOUNTER — OB ABSTRACT (OUTPATIENT)
Dept: OBGYN CLINIC | Facility: MEDICAL CENTER | Age: 23
End: 2018-01-24

## 2018-01-24 VITALS — SYSTOLIC BLOOD PRESSURE: 118 MMHG | BODY MASS INDEX: 32.1 KG/M2 | WEIGHT: 187 LBS | DIASTOLIC BLOOD PRESSURE: 64 MMHG

## 2018-01-24 VITALS
SYSTOLIC BLOOD PRESSURE: 108 MMHG | DIASTOLIC BLOOD PRESSURE: 60 MMHG | BODY MASS INDEX: 32.86 KG/M2 | WEIGHT: 191.44 LBS

## 2018-01-25 ENCOUNTER — APPOINTMENT (OUTPATIENT)
Dept: LAB | Facility: HOSPITAL | Age: 23
End: 2018-01-25
Payer: COMMERCIAL

## 2018-01-25 ENCOUNTER — ROUTINE PRENATAL (OUTPATIENT)
Dept: OBGYN CLINIC | Facility: MEDICAL CENTER | Age: 23
End: 2018-01-25

## 2018-01-25 VITALS — DIASTOLIC BLOOD PRESSURE: 60 MMHG | WEIGHT: 192.8 LBS | SYSTOLIC BLOOD PRESSURE: 100 MMHG | BODY MASS INDEX: 33.09 KG/M2

## 2018-01-25 DIAGNOSIS — Z3A.30 30 WEEKS GESTATION OF PREGNANCY: Primary | ICD-10-CM

## 2018-01-25 DIAGNOSIS — Z34.93 ENCOUNTER FOR SUPERVISION OF NORMAL PREGNANCY IN THIRD TRIMESTER: ICD-10-CM

## 2018-01-25 LAB
GLUCOSE 1H P 100 G GLC PO SERPL-MCNC: 148 MG/DL (ref 65–179)
GLUCOSE 2H P 100 G GLC PO SERPL-MCNC: 129 MG/DL (ref 65–154)
GLUCOSE 3H P 100 G GLC PO SERPL-MCNC: 87 MG/DL (ref 65–139)
GLUCOSE P FAST SERPL-MCNC: 91 MG/DL (ref 65–99)

## 2018-01-25 PROCEDURE — 36415 COLL VENOUS BLD VENIPUNCTURE: CPT

## 2018-01-25 PROCEDURE — 82951 GLUCOSE TOLERANCE TEST (GTT): CPT

## 2018-01-25 PROCEDURE — 82952 GTT-ADDED SAMPLES: CPT

## 2018-01-25 PROCEDURE — PNV: Performed by: NURSE PRACTITIONER

## 2018-01-25 NOTE — PROGRESS NOTES
Pt  C/o heartburn, advised small freq meals, tums, gave list of safe meds in pregnancy  Given birth plan, Given checking in visit info  Fkc/ptl precautions reviewed

## 2018-01-25 NOTE — PROGRESS NOTES
preg  Subjective     Rahul Arias is a 23 y o  female being seen today for her obstetrical visit  She is at 30w1d gestation  Patient no complaints  Fetal movement: normal     Menstrual History:  OB History      Para Term  AB Living    1 0 0 0 0 0    SAB TAB Ectopic Multiple Live Births    0 0 0 0 0           Patient's last menstrual period was 2017  Objective     /60   Wt 87 5 kg (192 lb 12 8 oz)   LMP 2017   BMI 33 09 kg/m²   FHT:  152 BPM   Uterine Size: 30 cm   Presentation: cephalic        Assessment     Pregnancy 0   Plan     28-week labs reviewed, normal    Follow up in 2 Weeks

## 2018-02-06 ENCOUNTER — ULTRASOUND (OUTPATIENT)
Dept: PERINATAL CARE | Facility: CLINIC | Age: 23
End: 2018-02-06
Payer: COMMERCIAL

## 2018-02-06 VITALS
SYSTOLIC BLOOD PRESSURE: 109 MMHG | DIASTOLIC BLOOD PRESSURE: 74 MMHG | BODY MASS INDEX: 34.3 KG/M2 | HEIGHT: 63 IN | WEIGHT: 193.6 LBS

## 2018-02-06 DIAGNOSIS — O99.213 OBESITY AFFECTING PREGNANCY, ANTEPARTUM, THIRD TRIMESTER: Primary | ICD-10-CM

## 2018-02-06 DIAGNOSIS — Z36.89 SCREENING, ANTENATAL, FOR FETAL ANATOMIC SURVEY: ICD-10-CM

## 2018-02-06 PROCEDURE — 76816 OB US FOLLOW-UP PER FETUS: CPT | Performed by: OBSTETRICS & GYNECOLOGY

## 2018-02-06 PROCEDURE — 99212 OFFICE O/P EST SF 10 MIN: CPT | Performed by: OBSTETRICS & GYNECOLOGY

## 2018-02-09 ENCOUNTER — ROUTINE PRENATAL (OUTPATIENT)
Dept: OBGYN CLINIC | Facility: MEDICAL CENTER | Age: 23
End: 2018-02-09

## 2018-02-09 VITALS — DIASTOLIC BLOOD PRESSURE: 64 MMHG | BODY MASS INDEX: 34.19 KG/M2 | WEIGHT: 193 LBS | SYSTOLIC BLOOD PRESSURE: 112 MMHG

## 2018-02-09 DIAGNOSIS — Z34.93 THIRD TRIMESTER PREGNANCY: Primary | ICD-10-CM

## 2018-02-09 PROCEDURE — PNV: Performed by: OBSTETRICS & GYNECOLOGY

## 2018-02-09 NOTE — PROGRESS NOTES
Merlyn Roe is a 23y o  year old  at [place] [address] for routine prenatal visit    + FM, no vaginal bleeding, contractions, or LOF  Complaints: No   Most recent ultrasound and labs reviewed    Birth plan reviewed

## 2018-02-23 ENCOUNTER — ROUTINE PRENATAL (OUTPATIENT)
Dept: OBGYN CLINIC | Facility: MEDICAL CENTER | Age: 23
End: 2018-02-23

## 2018-02-23 VITALS — SYSTOLIC BLOOD PRESSURE: 112 MMHG | BODY MASS INDEX: 35.27 KG/M2 | WEIGHT: 199.1 LBS | DIASTOLIC BLOOD PRESSURE: 76 MMHG

## 2018-02-23 DIAGNOSIS — Z34.93 THIRD TRIMESTER PREGNANCY: Primary | ICD-10-CM

## 2018-02-23 PROCEDURE — PNV: Performed by: OBSTETRICS & GYNECOLOGY

## 2018-02-23 NOTE — PROGRESS NOTES
Unknown Flirt is a 23y o  year old  at 34w2d for routine prenatal visit    + FM, no vaginal bleeding, contractions, or LOF  Complaints: Yes occasional headaches that resolve with tylenol , no visual changes or epigastric pain   GBS next visit   [address] and Labor precautions reviewed
0

## 2018-03-09 ENCOUNTER — ROUTINE PRENATAL (OUTPATIENT)
Dept: OBGYN CLINIC | Facility: MEDICAL CENTER | Age: 23
End: 2018-03-09

## 2018-03-09 VITALS — BODY MASS INDEX: 35.78 KG/M2 | WEIGHT: 202 LBS | SYSTOLIC BLOOD PRESSURE: 116 MMHG | DIASTOLIC BLOOD PRESSURE: 62 MMHG

## 2018-03-09 DIAGNOSIS — Z34.93 ENCOUNTER FOR PREGNANCY RELATED EXAMINATION IN THIRD TRIMESTER: Primary | ICD-10-CM

## 2018-03-09 LAB — EXTERNAL GROUP B STREP ANTIGEN: NEGATIVE

## 2018-03-09 PROCEDURE — PNV: Performed by: OBSTETRICS & GYNECOLOGY

## 2018-03-09 NOTE — PROGRESS NOTES
Marybel Jaffe is a 23y o  year old  at 37w1d for routine prenatal visit  GBS done Yes  PCN allergy No  Labor precautions given  [address] reviewed

## 2018-03-16 ENCOUNTER — ROUTINE PRENATAL (OUTPATIENT)
Dept: OBGYN CLINIC | Facility: MEDICAL CENTER | Age: 23
End: 2018-03-16

## 2018-03-16 VITALS — SYSTOLIC BLOOD PRESSURE: 110 MMHG | BODY MASS INDEX: 36.15 KG/M2 | WEIGHT: 204.1 LBS | DIASTOLIC BLOOD PRESSURE: 74 MMHG

## 2018-03-16 DIAGNOSIS — Z34.93 THIRD TRIMESTER PREGNANCY: Primary | ICD-10-CM

## 2018-03-16 PROCEDURE — PNV: Performed by: OBSTETRICS & GYNECOLOGY

## 2018-03-16 NOTE — PROGRESS NOTES
Edmundo Rodriguez is a 23y o  year old  at 42w2d for routine prenatal visit    + FM, no vaginal bleeding, contractions, or LOF  Complaints: No   Most recent ultrasound and labs reviewed    Aware GBS neg   [facility] and Labor precautions

## 2018-03-22 ENCOUNTER — ROUTINE PRENATAL (OUTPATIENT)
Dept: OBGYN CLINIC | Facility: MEDICAL CENTER | Age: 23
End: 2018-03-22

## 2018-03-22 VITALS — SYSTOLIC BLOOD PRESSURE: 112 MMHG | BODY MASS INDEX: 35.96 KG/M2 | WEIGHT: 203 LBS | DIASTOLIC BLOOD PRESSURE: 72 MMHG

## 2018-03-22 DIAGNOSIS — Z34.03 ENCOUNTER FOR SUPERVISION OF NORMAL FIRST PREGNANCY IN THIRD TRIMESTER: Primary | ICD-10-CM

## 2018-03-22 PROCEDURE — PNV: Performed by: OBSTETRICS & GYNECOLOGY

## 2018-03-22 NOTE — PROGRESS NOTES
Merlyn Roe is a 23y o  year old  at 38w1d for routine prenatal visit    + FM, no vaginal bleeding, contractions, or LOF  Complaints: No irregular contractions  Most recent ultrasound and labs reviewed    Labor precautions

## 2018-03-30 ENCOUNTER — ROUTINE PRENATAL (OUTPATIENT)
Dept: OBGYN CLINIC | Facility: MEDICAL CENTER | Age: 23
End: 2018-03-30

## 2018-03-30 VITALS — BODY MASS INDEX: 36.14 KG/M2 | WEIGHT: 204 LBS | DIASTOLIC BLOOD PRESSURE: 82 MMHG | SYSTOLIC BLOOD PRESSURE: 112 MMHG

## 2018-03-30 DIAGNOSIS — Z34.93 THIRD TRIMESTER PREGNANCY: Primary | ICD-10-CM

## 2018-03-30 PROCEDURE — PNV: Performed by: OBSTETRICS & GYNECOLOGY

## 2018-03-30 NOTE — PROGRESS NOTES
Cora Sandifer is a 23y o  year old  at 39w2d for routine prenatal visit    + FM, no vaginal bleeding, contractions, or LOF  Complaints: No occasional contractions  Most recent ultrasound and labs reviewed    Labor precautions

## 2018-04-04 ENCOUNTER — ROUTINE PRENATAL (OUTPATIENT)
Dept: OBGYN CLINIC | Facility: MEDICAL CENTER | Age: 23
End: 2018-04-04

## 2018-04-04 VITALS — DIASTOLIC BLOOD PRESSURE: 72 MMHG | WEIGHT: 204.8 LBS | BODY MASS INDEX: 36.28 KG/M2 | SYSTOLIC BLOOD PRESSURE: 102 MMHG

## 2018-04-04 DIAGNOSIS — Z34.93 THIRD TRIMESTER PREGNANCY: Primary | ICD-10-CM

## 2018-04-04 PROCEDURE — PNV: Performed by: OBSTETRICS & GYNECOLOGY

## 2018-04-04 NOTE — PROGRESS NOTES
Bernadette Castelan is a 23y o  year old  at 37w0d for routine prenatal visit    + FM, no vaginal bleeding, contractions, or LOF  Complaints: No / states she lost mucous plugs   Most recent ultrasound and labs reviewed    [address] and labor precautions   IOL scheduled for  @ 6:30 am

## 2018-04-06 ENCOUNTER — HOSPITAL ENCOUNTER (INPATIENT)
Facility: HOSPITAL | Age: 23
LOS: 1 days | End: 2018-04-07
Attending: OBSTETRICS & GYNECOLOGY | Admitting: OBSTETRICS & GYNECOLOGY
Payer: COMMERCIAL

## 2018-04-06 ENCOUNTER — HOSPITAL ENCOUNTER (OUTPATIENT)
Facility: HOSPITAL | Age: 23
Discharge: HOME | End: 2018-04-06
Attending: OBSTETRICS & GYNECOLOGY | Admitting: OBSTETRICS & GYNECOLOGY
Payer: COMMERCIAL

## 2018-04-06 ENCOUNTER — ANESTHESIA EVENT (INPATIENT)
Dept: LABOR AND DELIVERY | Facility: HOSPITAL | Age: 23
End: 2018-04-06
Payer: COMMERCIAL

## 2018-04-06 ENCOUNTER — ANESTHESIA (INPATIENT)
Dept: LABOR AND DELIVERY | Facility: HOSPITAL | Age: 23
End: 2018-04-06
Payer: COMMERCIAL

## 2018-04-06 VITALS
SYSTOLIC BLOOD PRESSURE: 124 MMHG | HEIGHT: 63 IN | RESPIRATION RATE: 16 BRPM | BODY MASS INDEX: 36.14 KG/M2 | HEART RATE: 102 BPM | DIASTOLIC BLOOD PRESSURE: 72 MMHG | WEIGHT: 204 LBS

## 2018-04-06 DIAGNOSIS — Z3A.40 40 WEEKS GESTATION OF PREGNANCY: Primary | ICD-10-CM

## 2018-04-06 LAB
ABO GROUP BLD: NORMAL
BASOPHILS # BLD AUTO: 0.02 THOUSANDS/ΜL (ref 0–0.1)
BASOPHILS NFR BLD AUTO: 0 % (ref 0–1)
BLD GP AB SCN SERPL QL: NEGATIVE
EOSINOPHIL # BLD AUTO: 0 THOUSAND/ΜL (ref 0–0.61)
EOSINOPHIL NFR BLD AUTO: 0 % (ref 0–6)
ERYTHROCYTE [DISTWIDTH] IN BLOOD BY AUTOMATED COUNT: 14.5 % (ref 11.6–15.1)
HCT VFR BLD AUTO: 39.1 % (ref 34.8–46.1)
HGB BLD-MCNC: 13.8 G/DL (ref 11.5–15.4)
LYMPHOCYTES # BLD AUTO: 1.76 THOUSANDS/ΜL (ref 0.6–4.47)
LYMPHOCYTES NFR BLD AUTO: 8 % (ref 14–44)
MCH RBC QN AUTO: 31.1 PG (ref 26.8–34.3)
MCHC RBC AUTO-ENTMCNC: 35.3 G/DL (ref 31.4–37.4)
MCV RBC AUTO: 88 FL (ref 82–98)
MONOCYTES # BLD AUTO: 0.81 THOUSAND/ΜL (ref 0.17–1.22)
MONOCYTES NFR BLD AUTO: 4 % (ref 4–12)
NEUTROPHILS # BLD AUTO: 20.44 THOUSANDS/ΜL (ref 1.85–7.62)
NEUTS SEG NFR BLD AUTO: 88 % (ref 43–75)
NRBC BLD AUTO-RTO: 0 /100 WBCS
PLATELET # BLD AUTO: 243 THOUSANDS/UL (ref 149–390)
PMV BLD AUTO: 10.2 FL (ref 8.9–12.7)
RBC # BLD AUTO: 4.44 MILLION/UL (ref 3.81–5.12)
RH BLD: POSITIVE
SPECIMEN EXPIRATION DATE: NORMAL
WBC # BLD AUTO: 23.03 THOUSAND/UL (ref 4.31–10.16)

## 2018-04-06 PROCEDURE — 85025 COMPLETE CBC W/AUTO DIFF WBC: CPT | Performed by: OBSTETRICS & GYNECOLOGY

## 2018-04-06 PROCEDURE — 99213 OFFICE O/P EST LOW 20 MIN: CPT

## 2018-04-06 PROCEDURE — 86592 SYPHILIS TEST NON-TREP QUAL: CPT | Performed by: OBSTETRICS & GYNECOLOGY

## 2018-04-06 PROCEDURE — 86850 RBC ANTIBODY SCREEN: CPT | Performed by: OBSTETRICS & GYNECOLOGY

## 2018-04-06 PROCEDURE — 99203 OFFICE O/P NEW LOW 30 MIN: CPT

## 2018-04-06 PROCEDURE — 76815 OB US LIMITED FETUS(S): CPT | Performed by: OBSTETRICS & GYNECOLOGY

## 2018-04-06 PROCEDURE — 86900 BLOOD TYPING SEROLOGIC ABO: CPT | Performed by: OBSTETRICS & GYNECOLOGY

## 2018-04-06 PROCEDURE — 10907ZC DRAINAGE OF AMNIOTIC FLUID, THERAPEUTIC FROM PRODUCTS OF CONCEPTION, VIA NATURAL OR ARTIFICIAL OPENING: ICD-10-PCS | Performed by: OBSTETRICS & GYNECOLOGY

## 2018-04-06 PROCEDURE — 86901 BLOOD TYPING SEROLOGIC RH(D): CPT | Performed by: OBSTETRICS & GYNECOLOGY

## 2018-04-06 RX ORDER — OXYTOCIN/RINGER'S LACTATE 30/500 ML
PLASTIC BAG, INJECTION (ML) INTRAVENOUS
Status: COMPLETED
Start: 2018-04-06 | End: 2018-04-07

## 2018-04-06 RX ORDER — CALCIUM CARBONATE 200(500)MG
500 TABLET,CHEWABLE ORAL 3 TIMES DAILY PRN
Status: DISCONTINUED | OUTPATIENT
Start: 2018-04-06 | End: 2018-04-07

## 2018-04-06 RX ORDER — SODIUM CHLORIDE, SODIUM LACTATE, POTASSIUM CHLORIDE, CALCIUM CHLORIDE 600; 310; 30; 20 MG/100ML; MG/100ML; MG/100ML; MG/100ML
125 INJECTION, SOLUTION INTRAVENOUS CONTINUOUS
Status: DISCONTINUED | OUTPATIENT
Start: 2018-04-06 | End: 2018-04-07

## 2018-04-06 RX ORDER — LIDOCAINE HYDROCHLORIDE AND EPINEPHRINE 15; 5 MG/ML; UG/ML
INJECTION, SOLUTION EPIDURAL AS NEEDED
Status: DISCONTINUED | OUTPATIENT
Start: 2018-04-06 | End: 2018-04-07 | Stop reason: SURG

## 2018-04-06 RX ORDER — MORPHINE SULFATE 10 MG/ML
10 INJECTION, SOLUTION INTRAMUSCULAR; INTRAVENOUS ONCE
Status: DISCONTINUED | OUTPATIENT
Start: 2018-04-06 | End: 2018-04-06 | Stop reason: HOSPADM

## 2018-04-06 RX ORDER — ONDANSETRON 2 MG/ML
4 INJECTION INTRAMUSCULAR; INTRAVENOUS EVERY 6 HOURS PRN
Status: DISCONTINUED | OUTPATIENT
Start: 2018-04-06 | End: 2018-04-07

## 2018-04-06 RX ORDER — ROPIVACAINE HYDROCHLORIDE 2 MG/ML
INJECTION, SOLUTION EPIDURAL; INFILTRATION; PERINEURAL AS NEEDED
Status: DISCONTINUED | OUTPATIENT
Start: 2018-04-06 | End: 2018-04-07 | Stop reason: SURG

## 2018-04-06 RX ADMIN — ROPIVACAINE HYDROCHLORIDE: 2 INJECTION, SOLUTION EPIDURAL; INFILTRATION at 22:18

## 2018-04-06 RX ADMIN — ROPIVACAINE HYDROCHLORIDE 6 ML: 2 INJECTION, SOLUTION EPIDURAL; INFILTRATION at 21:43

## 2018-04-06 RX ADMIN — ROPIVACAINE HYDROCHLORIDE 5 ML: 2 INJECTION, SOLUTION EPIDURAL; INFILTRATION at 21:46

## 2018-04-06 RX ADMIN — SODIUM CHLORIDE, SODIUM LACTATE, POTASSIUM CHLORIDE, AND CALCIUM CHLORIDE 125 ML/HR: .6; .31; .03; .02 INJECTION, SOLUTION INTRAVENOUS at 20:56

## 2018-04-06 RX ADMIN — SODIUM CHLORIDE, SODIUM LACTATE, POTASSIUM CHLORIDE, AND CALCIUM CHLORIDE 125 ML/HR: .6; .31; .03; .02 INJECTION, SOLUTION INTRAVENOUS at 22:24

## 2018-04-06 RX ADMIN — Medication 500 MG: at 22:51

## 2018-04-06 RX ADMIN — LIDOCAINE HYDROCHLORIDE AND EPINEPHRINE 3 ML: 15; 5 INJECTION, SOLUTION EPIDURAL at 21:40

## 2018-04-06 RX ADMIN — ONDANSETRON 4 MG: 2 INJECTION INTRAMUSCULAR; INTRAVENOUS at 22:17

## 2018-04-06 NOTE — PROCEDURES
Gerhardt Bray, a  at 40w2d with an JORDAN of 2018, by Last Menstrual Period, was seen at [address] [facility] for the following procedure(s): $Procedure Type: ROCKY]         4 Quadrant ROCKY  ROCKY Q1 (cm): 2 6 cm  ROCKY Q2 (cm): 2 2 cm  ROCKY Q3 (cm): 1 4 cm  ROCKY Q4 (cm): 0 2 cm  ROCKY TOTAL (cm): 6 4 cm  LVP (cm): 2 6 cm       Vertex presentation  Good fetal movement on ultrasound                David Haney DO  OB/GYN, PGY2  2018, 1:57 PM

## 2018-04-06 NOTE — PROGRESS NOTES
Progress Note- OB/GYN   Nikolay George 23 y o  female MRN: [id number]  Unit/Bed#: L&D 328-01 Encounter: [id number]    23 y o  Joe Barnett at 40w2d by LMP    She is a patient of Dr Luz Fallon, Dr Andrena Kawasaki    Chief complaint:  Decreased fetal movement and pelvic pressure    Patient reports onset of constant lower abdominal pain earlier this morning  Minimal relief after shower  Denies taking any treatment for abdominal pain  Also reports decreased fetal movement since onset of pain, not reporting any movement since presentation to triage  Lat reports adequate fetal movement around midnight last night, stating that she completed kick counts, which were adequate    Denies vaginal bleeding, leakage of fluid    Pregnancy notable for maternal obesity, elevated one hour GTT with 3 hour GTT WNL    Pregnancy Complications:  Patient Active Problem List   Diagnosis    Obesity affecting pregnancy, antepartum, third trimester    40 weeks gestation of pregnancy       PMH:  Past Medical History:   Diagnosis Date    Abnormal Pap smear of cervix     Atypical Cells    HPV (human papilloma virus) infection     Pap smear abnormality of cervix with LGSIL     Seasonal allergies        PSH:  Past Surgical History:   Procedure Laterality Date    EAR SURGERY Right     eustachian tube     WISDOM TOOTH EXTRACTION         Social Hx:  Denies x 3     OB Hx:  Obstetric History       T0      L0     SAB0   TAB0   Ectopic0   Multiple0   Live Births0       # Outcome Date GA Lbr Lucian/2nd Weight Sex Delivery Anes PTL Lv   1 Current                   Meds:  No current facility-administered medications on file prior to encounter  Current Outpatient Prescriptions on File Prior to Encounter   Medication Sig Dispense Refill    Prenatal Vit-Fe Fumarate-FA (PRENATAL 1+1 PO) Take 1 tablet by mouth         Allergies:   Allergies   Allergen Reactions    Ambrosia Artemisiifolia (Ragweed) Skin Test     Pollen Extract     Seasonal Ic  [Cholestatin]        Labs:  Blood type: O+  Antibody: negative  Group B strep: negative  HIV: negative  Hepatitis B: negative  RPR: NR  Rubella: Immune  Varicella Immune  1 hour Glucose: 156  3 hour Glucose: 91, 148, 129, 87    Physical Exam:  /72 (BP Location: Right arm)   Pulse 102   Resp 16   Ht 5' 3" (1 6 m)   Wt 92 5 kg (204 lb)   LMP 2017   BMI 36 14 kg/m²     Physical Exam   Constitutional: She is oriented to person, place, and time  She appears well-developed and well-nourished  She appears distressed  Cardiovascular: Normal rate, regular rhythm and normal heart sounds  Pulmonary/Chest: Effort normal and breath sounds normal    Abdominal: Soft  Bowel sounds are normal  She exhibits distension  Gravid abdomen   Neurological: She is alert and oriented to person, place, and time  Estimated Fetal Weight: 7 lbs  Presentation: Vertex    SVE: 3 / 80% / -1, unchanged on reexamination  FHT:  130 / Moderate 6 - 25 bpm / + accelerations, no decelerations  [place]: rare     Membranes: Intact    TAUS: Please see fetal testing note  ROCKY 6 3cm    Assessment:   23 y o   at 40w2d, not in labor, decreased fetal movement has resolved    Plan:   1  Signs and symptoms of labor reviewed with precautions to return  Pt demonstrated understanding  2  Keep regularly scheduled appointments  Pt scheduled for induction of labor on   3  Discharge to home    Discussed with Dr Gerald Kidd DO  OB/GYN, PGY2  2018, 2:00 PM

## 2018-04-07 ENCOUNTER — HOSPITAL ENCOUNTER (INPATIENT)
Facility: HOSPITAL | Age: 23
LOS: 2 days | Discharge: HOME | DRG: 776 | End: 2018-04-09
Attending: OBSTETRICS & GYNECOLOGY | Admitting: OBSTETRICS & GYNECOLOGY
Payer: COMMERCIAL

## 2018-04-07 VITALS
OXYGEN SATURATION: 99 % | TEMPERATURE: 97.5 F | RESPIRATION RATE: 18 BRPM | SYSTOLIC BLOOD PRESSURE: 103 MMHG | HEIGHT: 63 IN | DIASTOLIC BLOOD PRESSURE: 57 MMHG | BODY MASS INDEX: 36.14 KG/M2 | HEART RATE: 100 BPM

## 2018-04-07 LAB
BASE EXCESS BLDCOA CALC-SCNC: -22.4 MMOL/L (ref 3–11)
BASE EXCESS BLDCOV CALC-SCNC: -18.3 MMOL/L (ref 1–9)
HCO3 BLDCOA-SCNC: 13.5 MMOL/L (ref 17.3–27.3)
HCO3 BLDCOV-SCNC: 15.4 MMOL/L (ref 12.2–28.6)
O2 CT VFR BLDCOA CALC: 2.1 ML/DL
OXYHGB MFR BLDCOA: 8.4 %
OXYHGB MFR BLDCOV: 24.2 %
PCO2 BLDCOA: 80.5 MM[HG] (ref 30–60)
PCO2 BLDCOV: 71.1 MM HG (ref 27–43)
PH BLDCOA: 6.84 [PH] (ref 7.23–7.43)
PH BLDCOV: 6.95 [PH] (ref 7.19–7.49)
PO2 BLDCOA: 13.8 MM HG (ref 5–25)
PO2 BLDCOV: 20.4 MM HG (ref 15–45)
SAO2 % BLDCOV: 6.3 ML/DL

## 2018-04-07 PROCEDURE — 82805 BLOOD GASES W/O2 SATURATION: CPT | Performed by: OBSTETRICS & GYNECOLOGY

## 2018-04-07 PROCEDURE — 0KQM0ZZ REPAIR PERINEUM MUSCLE, OPEN APPROACH: ICD-10-PCS | Performed by: OBSTETRICS & GYNECOLOGY

## 2018-04-07 PROCEDURE — 59400 OBSTETRICAL CARE: CPT | Performed by: OBSTETRICS & GYNECOLOGY

## 2018-04-07 PROCEDURE — 88307 TISSUE EXAM BY PATHOLOGIST: CPT | Performed by: PATHOLOGY

## 2018-04-07 RX ORDER — IBUPROFEN 600 MG/1
600 TABLET ORAL EVERY 6 HOURS PRN
Status: DISCONTINUED | OUTPATIENT
Start: 2018-04-07 | End: 2018-04-10 | Stop reason: HOSPADM

## 2018-04-07 RX ORDER — DIAPER,BRIEF,INFANT-TODD,DISP
1 EACH MISCELLANEOUS AS NEEDED
Status: CANCELLED | OUTPATIENT
Start: 2018-04-07

## 2018-04-07 RX ORDER — SIMETHICONE 80 MG
80 TABLET,CHEWABLE ORAL 4 TIMES DAILY PRN
Status: CANCELLED | OUTPATIENT
Start: 2018-04-07

## 2018-04-07 RX ORDER — CALCIUM CARBONATE 200(500)MG
1000 TABLET,CHEWABLE ORAL DAILY PRN
Status: DISCONTINUED | OUTPATIENT
Start: 2018-04-07 | End: 2018-04-10 | Stop reason: HOSPADM

## 2018-04-07 RX ORDER — ACETAMINOPHEN 325 MG/1
650 TABLET ORAL EVERY 6 HOURS PRN
Status: DISCONTINUED | OUTPATIENT
Start: 2018-04-07 | End: 2018-04-10 | Stop reason: HOSPADM

## 2018-04-07 RX ORDER — OXYCODONE HYDROCHLORIDE AND ACETAMINOPHEN 5; 325 MG/1; MG/1
1 TABLET ORAL EVERY 4 HOURS PRN
Status: DISCONTINUED | OUTPATIENT
Start: 2018-04-07 | End: 2018-04-07 | Stop reason: HOSPADM

## 2018-04-07 RX ORDER — ACETAMINOPHEN 325 MG/1
650 TABLET ORAL EVERY 6 HOURS PRN
Status: DISCONTINUED | OUTPATIENT
Start: 2018-04-07 | End: 2018-04-07 | Stop reason: HOSPADM

## 2018-04-07 RX ORDER — ONDANSETRON 2 MG/ML
4 INJECTION INTRAMUSCULAR; INTRAVENOUS EVERY 8 HOURS PRN
Status: CANCELLED | OUTPATIENT
Start: 2018-04-07

## 2018-04-07 RX ORDER — ACETAMINOPHEN 325 MG/1
650 TABLET ORAL EVERY 6 HOURS PRN
Status: CANCELLED | OUTPATIENT
Start: 2018-04-07

## 2018-04-07 RX ORDER — DOCUSATE SODIUM 100 MG/1
100 CAPSULE, LIQUID FILLED ORAL 2 TIMES DAILY
Status: CANCELLED | OUTPATIENT
Start: 2018-04-07

## 2018-04-07 RX ORDER — DIPHENHYDRAMINE HCL 25 MG
25 TABLET ORAL EVERY 6 HOURS PRN
Status: CANCELLED | OUTPATIENT
Start: 2018-04-07

## 2018-04-07 RX ORDER — ONDANSETRON 2 MG/ML
4 INJECTION INTRAMUSCULAR; INTRAVENOUS EVERY 8 HOURS PRN
Status: DISCONTINUED | OUTPATIENT
Start: 2018-04-07 | End: 2018-04-07 | Stop reason: HOSPADM

## 2018-04-07 RX ORDER — OXYCODONE HYDROCHLORIDE AND ACETAMINOPHEN 5; 325 MG/1; MG/1
1 TABLET ORAL EVERY 4 HOURS PRN
Status: DISCONTINUED | OUTPATIENT
Start: 2018-04-07 | End: 2018-04-10 | Stop reason: HOSPADM

## 2018-04-07 RX ORDER — CALCIUM CARBONATE 200(500)MG
1000 TABLET,CHEWABLE ORAL DAILY PRN
Status: CANCELLED | OUTPATIENT
Start: 2018-04-07

## 2018-04-07 RX ORDER — OXYTOCIN/RINGER'S LACTATE 30/500 ML
250 PLASTIC BAG, INJECTION (ML) INTRAVENOUS CONTINUOUS
Status: DISCONTINUED | OUTPATIENT
Start: 2018-04-07 | End: 2018-04-07 | Stop reason: HOSPADM

## 2018-04-07 RX ORDER — CALCIUM CARBONATE 200(500)MG
1000 TABLET,CHEWABLE ORAL DAILY PRN
Status: DISCONTINUED | OUTPATIENT
Start: 2018-04-07 | End: 2018-04-07 | Stop reason: HOSPADM

## 2018-04-07 RX ORDER — DIPHENHYDRAMINE HCL 25 MG
25 TABLET ORAL EVERY 6 HOURS PRN
Status: DISCONTINUED | OUTPATIENT
Start: 2018-04-07 | End: 2018-04-10 | Stop reason: HOSPADM

## 2018-04-07 RX ORDER — DIAPER,BRIEF,INFANT-TODD,DISP
1 EACH MISCELLANEOUS AS NEEDED
Status: DISCONTINUED | OUTPATIENT
Start: 2018-04-07 | End: 2018-04-10 | Stop reason: HOSPADM

## 2018-04-07 RX ORDER — SIMETHICONE 80 MG
80 TABLET,CHEWABLE ORAL 4 TIMES DAILY PRN
Status: DISCONTINUED | OUTPATIENT
Start: 2018-04-07 | End: 2018-04-10 | Stop reason: HOSPADM

## 2018-04-07 RX ORDER — DIPHENHYDRAMINE HCL 25 MG
25 TABLET ORAL EVERY 6 HOURS PRN
Status: DISCONTINUED | OUTPATIENT
Start: 2018-04-07 | End: 2018-04-07 | Stop reason: HOSPADM

## 2018-04-07 RX ORDER — IBUPROFEN 600 MG/1
600 TABLET ORAL EVERY 6 HOURS PRN
Status: DISCONTINUED | OUTPATIENT
Start: 2018-04-07 | End: 2018-04-07 | Stop reason: HOSPADM

## 2018-04-07 RX ORDER — OXYCODONE HYDROCHLORIDE AND ACETAMINOPHEN 5; 325 MG/1; MG/1
1 TABLET ORAL EVERY 4 HOURS PRN
Status: CANCELLED | OUTPATIENT
Start: 2018-04-07

## 2018-04-07 RX ORDER — DOCUSATE SODIUM 100 MG/1
100 CAPSULE, LIQUID FILLED ORAL 2 TIMES DAILY
Status: DISCONTINUED | OUTPATIENT
Start: 2018-04-07 | End: 2018-04-10 | Stop reason: HOSPADM

## 2018-04-07 RX ORDER — ONDANSETRON 2 MG/ML
4 INJECTION INTRAMUSCULAR; INTRAVENOUS EVERY 8 HOURS PRN
Status: DISCONTINUED | OUTPATIENT
Start: 2018-04-07 | End: 2018-04-10 | Stop reason: HOSPADM

## 2018-04-07 RX ORDER — DOCUSATE SODIUM 100 MG/1
100 CAPSULE, LIQUID FILLED ORAL 2 TIMES DAILY
Status: DISCONTINUED | OUTPATIENT
Start: 2018-04-07 | End: 2018-04-07 | Stop reason: HOSPADM

## 2018-04-07 RX ORDER — SIMETHICONE 80 MG
80 TABLET,CHEWABLE ORAL 4 TIMES DAILY PRN
Status: DISCONTINUED | OUTPATIENT
Start: 2018-04-07 | End: 2018-04-07 | Stop reason: HOSPADM

## 2018-04-07 RX ORDER — OXYCODONE HYDROCHLORIDE AND ACETAMINOPHEN 5; 325 MG/1; MG/1
2 TABLET ORAL EVERY 4 HOURS PRN
Status: DISCONTINUED | OUTPATIENT
Start: 2018-04-07 | End: 2018-04-10 | Stop reason: HOSPADM

## 2018-04-07 RX ORDER — OXYCODONE HYDROCHLORIDE AND ACETAMINOPHEN 5; 325 MG/1; MG/1
2 TABLET ORAL EVERY 4 HOURS PRN
Status: DISCONTINUED | OUTPATIENT
Start: 2018-04-07 | End: 2018-04-07 | Stop reason: HOSPADM

## 2018-04-07 RX ORDER — OXYCODONE HYDROCHLORIDE AND ACETAMINOPHEN 5; 325 MG/1; MG/1
2 TABLET ORAL EVERY 4 HOURS PRN
Status: CANCELLED | OUTPATIENT
Start: 2018-04-07

## 2018-04-07 RX ORDER — IBUPROFEN 600 MG/1
600 TABLET ORAL EVERY 6 HOURS PRN
Status: CANCELLED | OUTPATIENT
Start: 2018-04-07

## 2018-04-07 RX ORDER — DIAPER,BRIEF,INFANT-TODD,DISP
1 EACH MISCELLANEOUS AS NEEDED
Status: DISCONTINUED | OUTPATIENT
Start: 2018-04-07 | End: 2018-04-07 | Stop reason: HOSPADM

## 2018-04-07 RX ADMIN — OXYCODONE HYDROCHLORIDE AND ACETAMINOPHEN 1 TABLET: 5; 325 TABLET ORAL at 20:16

## 2018-04-07 RX ADMIN — IBUPROFEN 600 MG: 600 TABLET, FILM COATED ORAL at 05:25

## 2018-04-07 RX ADMIN — WITCH HAZEL 1 PAD: 500 SOLUTION RECTAL; TOPICAL at 03:51

## 2018-04-07 RX ADMIN — BENZOCAINE AND MENTHOL: 20; .5 SPRAY TOPICAL at 03:51

## 2018-04-07 RX ADMIN — IBUPROFEN 600 MG: 600 TABLET ORAL at 22:14

## 2018-04-07 RX ADMIN — OXYCODONE HYDROCHLORIDE AND ACETAMINOPHEN 1 TABLET: 5; 325 TABLET ORAL at 10:21

## 2018-04-07 RX ADMIN — Medication 250 UNITS: at 02:51

## 2018-04-07 RX ADMIN — IBUPROFEN 600 MG: 600 TABLET ORAL at 14:37

## 2018-04-07 RX ADMIN — ACETAMINOPHEN 650 MG: 325 TABLET, FILM COATED ORAL at 03:51

## 2018-04-07 RX ADMIN — DOCUSATE SODIUM 100 MG: 100 CAPSULE, LIQUID FILLED ORAL at 10:20

## 2018-04-07 NOTE — ANESTHESIA PREPROCEDURE EVALUATION
Review of Systems/Medical History  Patient summary reviewed  Chart reviewed  No history of anesthetic complications     Cardiovascular  Negative cardio ROS    Pulmonary  Negative pulmonary ROS        GI/Hepatic  Negative GI/hepatic ROS          Negative  ROS        Endo/Other  Negative endo/other ROS      GYN  Currently pregnant ,          Hematology  Negative hematology ROS      Musculoskeletal       Neurology  Negative neurology ROS      Psychology   Negative psychology ROS              Physical Exam    Airway    Mallampati score: II  TM Distance: >3 FB  Neck ROM: full     Dental   No notable dental hx     Cardiovascular  Comment: Negative ROS, Rhythm: regular, Rate: normal, Cardiovascular exam normal    Pulmonary  Pulmonary exam normal Breath sounds clear to auscultation,     Other Findings        Anesthesia Plan  ASA Score- 2     Anesthesia Type- epidural with ASA Monitors  Additional Monitors:   Airway Plan:         Plan Factors-    Induction-     Postoperative Plan-     Informed Consent- Anesthetic plan and risks discussed with patient

## 2018-04-07 NOTE — DISCHARGE INSTRUCTIONS
Vaginal Delivery   WHAT YOU SHOULD KNOW:   A vaginal delivery is the birth of your baby through your vagina (birth canal)  AFTER YOU LEAVE:   Medicines:  · NSAIDs  help decrease swelling and pain or fever  This medicine is available with or without a doctor's order  NSAIDs can cause stomach bleeding or kidney problems in certain people  If you take blood thinner medicine, always ask your healthcare provider if NSAIDs are safe for you  Always read the medicine label and follow directions  · Take your medicine as directed  Call your healthcare provider if you think your medicine is not helping or if you have side effects  Tell him if you are allergic to any medicine  Keep a list of the medicines, vitamins, and herbs you take  Include the amounts, and when and why you take them  Bring the list or the pill bottles to follow-up visits  Carry your medicine list with you in case of an emergency  Follow up with your primary healthcare provider:  Most women need to return 6 weeks after a vaginal delivery  Ask about how to care for your wounds or stitches  Write down your questions so you remember to ask them during your visits  Activity:  Rest as much as possible  Try to keep all activities short  You may be able to do some exercise soon after you have your baby  Talk with your primary healthcare provider before you start exercising  If you work outside the home, ask when you can return to your job  Kegel exercises:  Kegel exercises may help your vaginal and rectal muscles heal faster  You can do Kegel exercises by tightening and relaxing the muscles around your vagina  Kegel exercises help make the muscles stronger  Breast care:  When your milk comes in, your breasts may feel full and hard  Ask how to care for your breasts, even if you are not breastfeeding  Constipation:  Do not try to push the bowel movement out if it is too hard   High-fiber foods, extra liquids, and regular exercise can help you prevent constipation  Examples of high-fiber foods are fruit and bran  Prune juice and water are good liquids to drink  Regular exercise helps your digestive system work  You may also be told to take over-the-counter fiber and stool softener medicines  Take these items as directed  Hemorrhoids:  Pregnancy can cause severe hemorrhoids  You may have rectal pain because of the hemorrhoids  Ask how to prevent or treat hemorrhoids  Perineum care: Your perineum is the area between your vagina and anus  Keep the area clean and dry to help it heal and to prevent infection  Wash the area gently with soap and water when you bathe or shower  Rinse your perineum with warm water when you use the toilet  Your primary healthcare provider may suggest you use a warm sitz bath to help decrease pain  A sitz bath is a bathtub or basin filled to hip level  Stay in the sitz bath for 20 to 30 minutes, or as directed  Vaginal discharge: You will have vaginal discharge, called lochia, after your delivery  The lochia is bright red the first day or two after the birth  By the fourth day, the amount decreases, and it turns red-brown  Use a sanitary pad rather than a tampon to prevent a vaginal infection  It is normal to have lochia up to 8 weeks after your baby is born  Monthly periods: Your period may start again within 7 to 12 weeks after your baby is born  If you are breastfeeding, it may take longer for your period to start again  You can still get pregnant again even though you do not have your monthly period  Talk with your primary healthcare provider about a birth control method that will be good for you if you do not want to get pregnant  Mood changes: Many new mothers have some kind of mood changes after delivery  Some of these changes occur because of lack of sleep, hormone changes, and caring for a new baby  Some mood changes can be more serious, such as postpartum depression   Talk with your primary healthcare provider if you feel unable to care for yourself or your baby  Sexual activity:  You may need to avoid sex for 6 to 7 weeks after you have your baby  You may notice you have a decreased desire for sex, or sex may be painful  You may need to use a vaginal lubricant (gel) to help make sex more comfortable  Contact your primary healthcare provider if:   · You have heavy vaginal bleeding that fills 1 or more sanitary pads in 1 hour  · You have a fever  · Your pain does not go away, or gets worse  · The skin between your vagina and rectum is swollen, warm, or red  · You have swollen, hard, or painful breasts  · You feel very sad or depressed  · You feel more tired than usual      · You have questions or concerns about your condition or care  Seek care immediately or call 911 if:   · You have pus or yellow drainage coming from your vagina or wound  · You are urinating very little, or not at all  · Your arm or leg feels warm, tender, and painful  It may look swollen and red  · You feel lightheaded, have sudden and worsening chest pain, or trouble breathing  You may have more pain when you take deep breaths or cough, or you may cough up blood  © 2014 [address] is for End User's use only and may not be sold, redistributed or otherwise used for commercial purposes  All illustrations and images included in CareNotes® are the copyrighted property of [employer] , [employer]mo  or Sincere Kennedy  The above information is an  only  It is not intended as medical advice for individual conditions or treatments  Talk to your doctor, nurse or pharmacist before following any medical regimen to see if it is safe and effective for you

## 2018-04-07 NOTE — DISCHARGE SUMMARY
Discharge Summary - OB/GYN  Marcela Stratton 23 y o  female MRN: [id number]  Unit/Bed#: L&D 320-01 Encounter: [id number]    Admission Date: 4/6/2018     Discharge Date: ***    Attending: Betzy Pinto MD    Principal Diagnosis: Pregnancy at 40w3d    Secondary Diagnosis: Labor    Procedures: spontaneous vaginal delivery    Anesthesia: epidural    Complications: poor fetal pH suspicious for meconium aspiration syndrome    Patient was admitted in active labor and managed expectantly  She received an epidural for anesthesia  She had a category 2 FHT but was making rapid cervical change  She was AROMed for thick meconium  NICU was made aware  Patient pushed for 2h 53minutes and had a vaginal delivery on 4/7/18 at 0233  Apgars were 3 and 7, arterial cord pH: 6 843 base excess -22 4, venous cord pH: 6 953, base excess -18  3  Parents were made aware at the bedside by NICU physician and informed of the need for head cooling  Baby was to be transferred to [place]  After patient was stabilized in the immediate postpartum period, she was also transferred to [place] for the remainder of her postpartum course  She was urinating, ambulating, tolerating PO and passing flatus  Her pain was well controlled with oral analgesics  She was discharged home on postpartum day #***  Condition at discharge: {condition:26485}     Discharge instructions/Information to patient and family:   See after visit summary for information provided to patient and family  Provisions for Follow-Up Care:  See after visit summary for information related to follow-up care and any pertinent home health orders  Disposition: See After Visit Summary for discharge disposition information  Planned Readmission: No    Discharge Medications: For a complete list of the patient's medications, please refer to her med rec

## 2018-04-07 NOTE — OB LABOR/OXYTOCIN SAFETY PROGRESS
Labor Progress Note - Sharri Block 23 y o  female MRN: [id number]    Unit/Bed#: L&D 320-01 Encounter: [id number]    Obstetric History       T0      L0     SAB0   TAB0   Ectopic0   Multiple0   Live Births0      Gestational Age: 41w4d               Dilation: Lip/rim (Comment)        Effacement (%): 100  Station: 0                  Addendum:  Category II tracing - normal labor curve and adequate cervical change,  anticipated - continue current management  Notes/comments:   Complains of reflux and nausea  Tums and Zofran ordered            Rosemarie Tang MD 2018 10:16 PM

## 2018-04-07 NOTE — L&D DELIVERY NOTE
Late entry:    Patient reported increased pressure  There noted to be early deceleration on FHT  CVX: complete/+2  FHTs: category II   Will start pushing, anticipate

## 2018-04-07 NOTE — LACTATION NOTE
Mom is pumping due to infant in NICU  Reviewed technique, frequency and equipment cleaning  Mom is obtaining some colostrum  Given admission breastfeeding pkt  Encouraged to call for  additional assistance as needed

## 2018-04-07 NOTE — H&P
H&P Exam - Obstetrics   Nhung Elizondo 23 y o  female MRN: [id number]  Unit/Bed#: L&D 329-02 Encounter: [id number]    Assessment/Plan     Assessment:  23year old  at 40 weeks and 2 days in labor  GBS negative    Plan:  1  Admit for labor  -expectant management  -augmentation if necessary  2  Intrapartum pain management  -epidural on request    History of Present Illness   Chief Complaint: Active labor    HPI:  Nhung Elizondo is a 23 y o   female with an JORDAN of 2018, by Last Menstrual Period at 40w2d weeks gestation who is being admitted for Active labor  Her current obstetrical history is uncomplicated  Contractions: Frequency: Every 2-3 minutes  Leakage of fluid: None  Bleeding: None  Fetal movement: present  Pregnancy complications: none  Review of Systems   Constitutional: Negative for chills and fever  Eyes: Negative for visual disturbance  Respiratory: Negative for chest tightness and shortness of breath  Cardiovascular: Negative for chest pain  Gastrointestinal: Negative for abdominal pain, nausea and vomiting  Genitourinary: Negative for vaginal bleeding  Neurological: Negative for dizziness and headaches         Historical Information   OB History    Para Term  AB Living   1 0 0 0 0 0   SAB TAB Ectopic Multiple Live Births   0 0 0 0 0      # Outcome Date GA Lbr Lucian/2nd Weight Sex Delivery Anes PTL Lv   1 Current                 Baby complications/comments: none  Past Medical History:   Diagnosis Date    Abnormal Pap smear of cervix     Atypical Cells    HPV (human papilloma virus) infection     Pap smear abnormality of cervix with LGSIL     Seasonal allergies      Past Surgical History:   Procedure Laterality Date    EAR SURGERY Right     eustachian tube     WISDOM TOOTH EXTRACTION       Social History   History   Alcohol Use No     History   Drug Use No     History   Smoking Status    Former Smoker    Packs/day: 0 25    Types: Cigarettes Smokeless Tobacco    Never Used     Comment: Never a smoker     Family History: non-contributory    Meds/Allergies   all medications and allergies reviewed  Allergies   Allergen Reactions    Ambrosia Artemisiifolia (Ragweed) Skin Test     Pollen Extract     Seasonal Ic  [Cholestatin]        Objective   Vitals: Temperature 98 8 °F (37 1 °C), temperature source Oral, BP: 124/72, HR: 102, RR: 16 , last menstrual period 06/28/2017, not currently breastfeeding  There is no height or weight on file to calculate BMI  Invasive Devices          No matching active lines, drains, or airways          Physical Exam   Constitutional: She is oriented to person, place, and time  She appears well-developed and well-nourished  No distress  HENT:   Head: Normocephalic and atraumatic  Neck: Normal range of motion  Cardiovascular: Normal rate  Pulmonary/Chest: Effort normal  Right breast exhibits no mass  Left breast exhibits no mass  Abdominal: Soft  There is no tenderness  Genitourinary: Vagina normal and uterus normal    Musculoskeletal: Normal range of motion  Neurological: She is alert and oriented to person, place, and time  Skin: Skin is warm and dry  She is not diaphoretic  Psychiatric: She has a normal mood and affect   Her behavior is normal      SVE: 6/90/-1    Prenatal Labs:   Blood Type:   Lab Results   Component Value Date/Time    ABO Grouping O 08/29/2017 12:54 PM     , D (Rh type):   Lab Results   Component Value Date/Time    Rh Factor Positive 08/29/2017 12:54 PM     , Antibody Screen:   Lab Results   Component Value Date/Time    Antibody Screen Negative 08/29/2017 12:54 PM    , HCT/HGB:   Lab Results   Component Value Date/Time    Hematocrit 34 2 (L) 12/26/2017 01:31 PM    Hemoglobin 11 6 12/26/2017 01:31 PM      , Platelets:   Lab Results   Component Value Date/Time    Platelets 861 89/26/6870 01:31 PM      , 1 hour Glucola:   Lab Results   Component Value Date/Time    Glucose 156 (H) 12/26/2017 01:31 PM   , Rubella:   Lab Results   Component Value Date/Time    Rubella IgG Quant 87 5 08/29/2017 12:54 PM        , VDRL/RPR:   Lab Results   Component Value Date/Time    RPR Non-Reactive 08/29/2017 12:54 PM      , Hep B:   Lab Results   Component Value Date/Time    Hepatitis B Surface Ag Non-reactive 08/29/2017 12:54 PM     , HIV:   Lab Results   Component Value Date/Time    HIV-1/HIV-2 Ab Non-Reactive 08/29/2017 12:54 PM     , Chlamydia: negative  , Gonorrhea:   Lab Results   Component Value Date/Time    N gonorrhoeae, DNA Probe N  gonorrhoeae Amplified DNA Negative 09/29/2017 02:51 PM     , Group B Strep:    Lab Results   Component Value Date/Time    External Strep Group B Ag Negative 03/09/2018          Imaging, EKG, Pathology, and Other Studies: I have personally reviewed pertinent reports

## 2018-04-07 NOTE — ANESTHESIA PROCEDURE NOTES
Epidural Block    Patient location during procedure: OB  Start time: 4/6/2018 9:41 PM  Reason for block: at surgeon's request and primary anesthetic  Staffing  Anesthesiologist: Marshal Rhodes  Performed: anesthesiologist   Preanesthetic Checklist  Completed: patient identified, site marked, surgical consent, pre-op evaluation, timeout performed, IV checked, risks and benefits discussed and monitors and equipment checked  Epidural  Patient position: sitting  Prep: Betadine  Patient monitoring: heart rate, continuous pulse ox and frequent blood pressure checks  Approach: midline  Location: lumbar (1-5)  Injection technique: BROOKS saline  Needle  Needle type: Tuohy   Needle gauge: 18 G  Catheter type: side hole  Catheter size: 20 G  Catheter at skin depth: 12 cm  Test dose: negative and lidocaine 1 5% with epinephrine 1-to-200,000negative aspiration for CSF, negative aspiration for heme and no paresthesia on injection  patient tolerated the procedure well with no immediate complications

## 2018-04-07 NOTE — EMTALA/ACUTE CARE TRANSFER
[address] AND DELIVERY  [address] [place] [address]  Dept: [phone]      ACUTE CARE TRANSFER CONSENT    NAME Ewa MOYER 1995                              MRN [id number]    I have been informed of my rights regarding examination, treatment, and transfer   by Dr Shanique Blair MD    Benefits: To be closer to baby in the NICU    Risks:        Consent for Transfer:  I acknowledge that my medical condition has been evaluated and explained to me by the treating physician or other qualified medical person and/or my attending physician, who has recommended that I be transferred to the service of    at    The above potential benefits of such transfer, the potential risks associated with such transfer, and the probable risks of not being transferred have been explained to me, and I fully understand them  The doctor has explained that, in my case, the benefits of transfer outweigh the risks  I agree to be transferred  I authorize the performance of emergency medical procedures and treatments upon me in both transit and upon arrival at the receiving facility  Additionally, I authorize the release of any and all medical records to the receiving facility and request they be transported with me, if possible  I understand that the safest mode of transportation during a medical emergency is an ambulance and that the Hospital advocates the use of this mode of transport  Risks of traveling to the receiving facility by car, including absence of medical control, life sustaining equipment, such as oxygen, and medical personnel has been explained to me and I fully understand them  (PERRY CORRECT BOX BELOW)  [  ]  I consent to the stated transfer and to be transported by ambulance/helicopter  [  ]  I consent to the stated transfer, but refuse transportation by ambulance and accept full responsibility for my transportation by car    I understand the risks of non-ambulance transfers and I exonerate the Hospital and its staff from any deterioration in my condition that results from this refusal     X___________________________________________    DATE  18  TIME________  Signature of patient or legally responsible individual signing on patient behalf           RELATIONSHIP TO PATIENT_________________________          Provider Certification    NAME Sanid Silva                                         1995                              MRN [id number]    A medical screening exam was performed on the above named patient  Based on the examination:    Condition Necessitating Transfer NICU services    Patient Condition:   stable    Reason for Transfer: To be closer to baby in the NICU    Transfer Requirements: Facility     · Space available and qualified personnel available for treatment as acknowledged by    · Agreed to accept transfer and to provide appropriate medical treatment as acknowledged by          · Appropriate medical records of the examination and treatment of the patient are provided at the time of transfer   [address], [address] _______  · Transfer will be performed by qualified personnel from    and appropriate transfer equipment as required, including the use of necessary and appropriate life support measures      Provider Certification: I have examined the patient and explained the following risks and benefits of being transferred/refusing transfer to the patient/family:         Based on these reasonable risks and benefits to the patient and/or the unborn child(maite), and based upon the information available at the time of the patients examination, I certify that the medical benefits reasonably to be expected from the provision of appropriate medical treatments at another medical facility outweigh the increasing risks, if any, to the individuals medical condition, and in the case of labor to the unborn child, from effecting the transfer      X____________________________________________ DATE 04/07/18        TIME_______      ORIGINAL - SEND TO MEDICAL RECORDS   COPY - SEND WITH PATIENT DURING TRANSFER

## 2018-04-07 NOTE — L&D DELIVERY NOTE
Late entry   Patient received epidural and was found to be comfortable  CVX: 8/100/0  CTX: q 3-5 minutes  FHTs: 150, accelerations present     Continue current management

## 2018-04-07 NOTE — PLAN OF CARE
Problem: Knowledge Deficit  Goal: Verbalizes understanding of labor plan  Assess patient/family/caregiver's baseline knowledge level and ability to understand information  Provide education via patient/family/caregiver's preferred learning method at appropriate level of understanding  1  Provide teaching at level of understanding  2  Provide teaching via preferred learning method(s)  Outcome: Completed Date Met: 04/07/18      Problem: Labor & Delivery  Goal: Manages discomfort  Assess and monitor for signs and symptoms of discomfort  Assess patient's pain level regularly and per hospital policy  Administer medications as ordered  Support use of nonpharmacological methods to help control pain such as distraction, imagery, relaxation, and application of heat and cold  Collaborate with interdisciplinary team and patient to determine appropriate pain management plan  1  Include patient in decisions related to comfort  2  Offer non-pharmacological pain management interventions  3  Report ineffective pain management to physician  Outcome: Completed Date Met: 04/07/18    Goal: Patient vital signs are stable  1  Assess vital signs - vaginal delivery    Outcome: Completed Date Met: 04/07/18

## 2018-04-07 NOTE — L&D DELIVERY NOTE
Delivery Summary - OB/GYN   Ewa Cunha 23 y o  female MRN: [id number]  Unit/Bed#: L&D 320-01 Encounter: [id number]    Pre-delivery Diagnosis:   1  40w3d pregnancy  2  Active labor  3  Thick meconium    Post-delivery Diagnosis: same, delivered    Attending: Nura     Assistant(s): Lukas    Procedure: , repair of second degree laceration     Anesthesia: epidural    Estimated Blood Loss:  250 mL    Specimens:   1  Arterial and venous cord gases  2  Cord blood  3  Segment of umbilical cord  4  Placenta to pathology      Complications:      Findings:  1  Viable male  delivered at 0233 weighing 6lbs 8oz;  Apgar scores of 3 at one minute and 7 at five minutes  2  Spontaneous delivery of placenta with centrally inserted 3-vessel cord  3  Thick meconium stained fluid  4  2nd degree perineal laceration, repaired with 3-0Vicryl  5  Umbilical Cord Gases     pH Base Excess   Arterial   Results from last 7 days  Lab Units 18  0238   PH COA  6 843*       Results from last 7 days  Lab Units 18  0238   BASE EXC COA mmol/L -22 4*      Venous   Results from last 7 days  Lab Units 18  0238   PH COV  6 953*       Results from last 7 days  Lab Units 18  0238   BASE EXC COV mmol/L -18 3*             Disposition: Patient tolerated the procedure well and was recovering in labor and delivery room with family and  before being transferred to the post-partum floor  Procedure Details     Description of procedure  The patient presented to L&D in active labor around   Upon initial evaluation, the patient's cervical exam was 6/90/-1  The fetal heart tones were category II with late decelerations and moderate variability  She was marty every 2 - 5 minutes  She requested an epidural and that was placed at 2125 and completed at 2145  Once she was comfortable with her epidural, she was rechecked and her cervical exam was noted to be 8/100/0    The fetal heart tones were noted to have moderate variability and occasional variable deceleration  At 2200, her cervical exam was checked due to late decelerations and was noted to have an anterior lip/100/0  An amniotomy was performed which revealed thick meconium  The patient was repositioned with improvement of late decelerations  She was then noted to have a prolonged deceration, which resolved  The fetal heart tones recovered and was noted to have moderated variability with accelerations and intermittent variable deceleration  At 0731 40 44 23, she was noted to be completely dilated  Due to normal labor progress and overall moderate variability, it was decided to   proceed with anticipated vaginal delivery  During pushing, fetal heart tones were noted to have moderate variability with occasional late and prolonged decelerations with recovery of fetal heart tones  After 2 hours of pushing, a second stage huddle was performed  Patient was given options of proceeding with current management/pushing, operative vaginal delivery or  section  At this time, the fetal head was at +3 - +4 station  Patient agreed to continue to push  After pushing for 2 hours and 53 minutes, at 0233 patient delivered a viable male , weighing 6 8  Apgars of 3 (1 min) and 7 (5 min)  The fetal vertex delivered spontaneously  There was a nuchal cord  The anterior shoulder delivered atraumatically with maternal expulsive forces and the assistance of downward traction  The posterior shoulder delivered with maternal expulsive forces and the assistance of upward traction  The remainder of the fetus delivered spontaneously  Upon delivery, the infant was placed on the mothers abdomen and the cord was clamped and cut  The infant was noted to be limp  Neonatology was already at the delivery due to meconium fluid  There was no evidence for injury  Arterial and venous cord blood gases and cord blood was collected for analysis   These were promptly sent to the lab  In the immediate post-partum, 30 units of IV pitocin was administered and the uterus was noted to contract down well with massage and pitocin  The placenta delivered spontaneously at 0235 and was noted to have a centrally inserted 3 vessel cord  The vagina, cervix, and perineum were inspected and there was noted to be second degree laceration  Laceration Repair  Patient was comfortable with epidural at that time  A second degree laceration was identified and required repair  Laceration was repaired with 3-0 Vicryl in usual fashion to reapproximate the laceration  Good hemostasis was confirmed at the conclusion of this procedure  At the conclusion of the delivery, all needle, sponge, and instrument counts were noted to be correct  Patient tolerated the procedure well and was allowed to recover in labor and delivery room with family and  before being transferred to the post-partum floor  Dr Asif Perry was present and participated in all key portions of the case

## 2018-04-07 NOTE — PROGRESS NOTES
Late entry:  Of note, Dr Genna Draper and I both discussed the abnormal cord gas results separately and in detail after the delivery  All questions answered

## 2018-04-08 PROCEDURE — 99024 POSTOP FOLLOW-UP VISIT: CPT | Performed by: OBSTETRICS & GYNECOLOGY

## 2018-04-08 RX ADMIN — DOCUSATE SODIUM 100 MG: 100 CAPSULE, LIQUID FILLED ORAL at 08:58

## 2018-04-08 RX ADMIN — IBUPROFEN 600 MG: 600 TABLET ORAL at 08:58

## 2018-04-08 RX ADMIN — IBUPROFEN 600 MG: 600 TABLET ORAL at 17:48

## 2018-04-08 RX ADMIN — WITCH HAZEL 1 PAD: 500 SOLUTION RECTAL; TOPICAL at 19:54

## 2018-04-08 RX ADMIN — DOCUSATE SODIUM 100 MG: 100 CAPSULE, LIQUID FILLED ORAL at 17:48

## 2018-04-08 RX ADMIN — OXYCODONE HYDROCHLORIDE AND ACETAMINOPHEN 2 TABLET: 5; 325 TABLET ORAL at 04:46

## 2018-04-08 RX ADMIN — OXYCODONE HYDROCHLORIDE AND ACETAMINOPHEN 1 TABLET: 5; 325 TABLET ORAL at 23:25

## 2018-04-08 RX ADMIN — OXYCODONE HYDROCHLORIDE AND ACETAMINOPHEN 1 TABLET: 5; 325 TABLET ORAL at 13:34

## 2018-04-08 NOTE — SOCIAL WORK
NICU Admission    CM met with patient at bedside  Pt's mother Joce Devries ([phone]) was also present  Baby gerri Magdaleno is the 1st child for Pt and SO/FOB Vernard Mohs ([phone])  Pt and FOB reside with her parents and Pt reports SO is involved and supportive  Pt states that she has all needed supplies for baby  Pt will breast-feed and has a breast pump at home  No WIC/SNAP  Pt and FOB are both employed full-time  Pt reports that she will return to work at [employer] in approximately 2 months and baby will be cared for by family and then eventually at   Pt reports reliable transportation  Pediatrician is Dr Jac Olivares at [facility]  No issues with prenatal care  No MH or D/A issues reported  Pt denies any legal issues or involvement with community supports  No other CM needs identified

## 2018-04-08 NOTE — PROGRESS NOTES
Progress Note - OB/GYN   Marlene Barksdale 23 y o  female MRN: [id number]  Unit/Bed#: [facility] 873-01 Encounter: [id number]    Assessment:  Post partum Day #1 s/p  at [facility], transferred to [place] to be closer to baby , stable  Baby in NICU for abnormal cord blood gases after delivery    Plan:  Continue routine post partum care  Encourage ambulation  Encourage breastfeeding  Pain control PRN    Subjective/Objective   Chief Complaint:     Post delivery    Subjective:   Pt doing well, no complaints  States her and  about to go to NICU to visit baby  Tolerated breakfast well  Pain: yes, cramping, improved with meds  Tolerating PO: yes  Voiding: yes  Flatus: yes  BM: no  Ambulating: yes  Breastfeeding:  Yes, pumping  Chest pain: no  Shortness of breath: no  Leg pain: no  Lochia: minimal    Objective:     Vitals: /63 (BP Location: Left arm)   Pulse 88   Temp 98 °F (36 7 °C) (Tympanic)   Resp 18   LMP 2017   Breastfeeding?  No     No intake or output data in the 24 hours ending 18 0941    Lab Results   Component Value Date    WBC 23 03 (H) 2018    HGB 13 8 2018    HCT 39 1 2018    MCV 88 2018     2018       Meds/Allergies   Current Facility-Administered Medications   Medication Dose Route Frequency    acetaminophen (TYLENOL) tablet 650 mg  650 mg Oral Q6H PRN    benzocaine-menthol-lanolin-aloe (DERMOPLAST) 20-0 5 % topical spray   Topical TID PRN    calcium carbonate (TUMS) chewable tablet 1,000 mg  1,000 mg Oral Daily PRN    diphenhydrAMINE (BENADRYL) tablet 25 mg  25 mg Oral Q6H PRN    docusate sodium (COLACE) capsule 100 mg  100 mg Oral BID    hydrocortisone 1 % cream 1 application  1 application Topical PRN    ibuprofen (MOTRIN) tablet 600 mg  600 mg Oral Q6H PRN    ondansetron (ZOFRAN) injection 4 mg  4 mg Intravenous Q8H PRN    oxyCODONE-acetaminophen (PERCOCET) 5-325 mg per tablet 1 tablet  1 tablet Oral Q4H PRN    oxyCODONE-acetaminophen (PERCOCET) 5-325 mg per tablet 2 tablet  2 tablet Oral Q4H PRN    simethicone (MYLICON) chewable tablet 80 mg  80 mg Oral 4x Daily PRN    witch hazel-glycerin (TUCKS) topical pad 1 pad  1 pad Topical PRN       Physical Exam:     Gen: Pt ambulating in room, AAOx3, NAD  CV: RRR  Lungs: CTA b/l  Abd: Soft, non-tender, non-distended, no rebound or guarding  Uterine fundus firm and non-tender,  at the umbilicus umbilicus  Ext: Non tender        Ngozi Swanson MD  OBGYN, PGY-1  4/8/2018 9:45 AM

## 2018-04-08 NOTE — PROGRESS NOTES
[facility] was [facility] [address] [facility] 4[facility]       Best Garcia MD  [facility], PGY-1  4/8/2018 9:55 AM

## 2018-04-09 VITALS
OXYGEN SATURATION: 95 % | TEMPERATURE: 98.5 F | DIASTOLIC BLOOD PRESSURE: 69 MMHG | RESPIRATION RATE: 18 BRPM | HEART RATE: 86 BPM | SYSTOLIC BLOOD PRESSURE: 109 MMHG

## 2018-04-09 LAB — RPR SER QL: NORMAL

## 2018-04-09 PROCEDURE — 99024 POSTOP FOLLOW-UP VISIT: CPT | Performed by: OBSTETRICS & GYNECOLOGY

## 2018-04-09 RX ORDER — DOCUSATE SODIUM 100 MG/1
100 CAPSULE, LIQUID FILLED ORAL 2 TIMES DAILY
Qty: 10 CAPSULE | Refills: 0 | Status: SHIPPED | OUTPATIENT
Start: 2018-04-09 | End: 2020-01-21 | Stop reason: ALTCHOICE

## 2018-04-09 RX ORDER — ACETAMINOPHEN 325 MG/1
650 TABLET ORAL EVERY 6 HOURS PRN
Qty: 30 TABLET | Refills: 0
Start: 2018-04-09

## 2018-04-09 RX ORDER — IBUPROFEN 600 MG/1
600 TABLET ORAL EVERY 6 HOURS PRN
Qty: 30 TABLET | Refills: 0 | Status: SHIPPED | OUTPATIENT
Start: 2018-04-09 | End: 2020-01-21 | Stop reason: ALTCHOICE

## 2018-04-09 RX ORDER — DIAPER,BRIEF,INFANT-TODD,DISP
1 EACH MISCELLANEOUS AS NEEDED
Qty: 30 G | Refills: 0
Start: 2018-04-09 | End: 2020-01-21 | Stop reason: ALTCHOICE

## 2018-04-09 RX ADMIN — OXYCODONE HYDROCHLORIDE AND ACETAMINOPHEN 1 TABLET: 5; 325 TABLET ORAL at 21:12

## 2018-04-09 RX ADMIN — IBUPROFEN 600 MG: 600 TABLET ORAL at 08:01

## 2018-04-09 RX ADMIN — HYDROCORTISONE 1 APPLICATION: 1 CREAM TOPICAL at 15:09

## 2018-04-09 RX ADMIN — DOCUSATE SODIUM 100 MG: 100 CAPSULE, LIQUID FILLED ORAL at 17:35

## 2018-04-09 RX ADMIN — IBUPROFEN 600 MG: 600 TABLET ORAL at 15:09

## 2018-04-09 RX ADMIN — DIPHENHYDRAMINE HCL 25 MG: 25 TABLET ORAL at 15:09

## 2018-04-09 RX ADMIN — BENZOCAINE AND LEVOMENTHOL: 200; 5 SPRAY TOPICAL at 08:02

## 2018-04-09 RX ADMIN — OXYCODONE HYDROCHLORIDE AND ACETAMINOPHEN 1 TABLET: 5; 325 TABLET ORAL at 06:20

## 2018-04-09 RX ADMIN — DIPHENHYDRAMINE HCL 25 MG: 25 TABLET ORAL at 08:01

## 2018-04-09 RX ADMIN — WITCH HAZEL 1 PAD: 500 SOLUTION RECTAL; TOPICAL at 15:09

## 2018-04-09 RX ADMIN — DOCUSATE SODIUM 100 MG: 100 CAPSULE, LIQUID FILLED ORAL at 08:01

## 2018-04-09 NOTE — LACTATION NOTE
demonstrated hands on pumping and adjustments that can be made to the pump to initiate a milk ejection reflex  Demonstrated a pumping log with Sheryl Novoa who conveys understanding of material     Powerpoints given on mom/ care class and breastfeeding class at patient request     Discussed s/s engorgement and how to manage with medications and cool compresses as well as s/s mastitis and when to contact physician

## 2018-04-09 NOTE — PROGRESS NOTES
Progress Note - OB/GYN   Abel Erm 23 y o  female MRN: [id number]  Unit/Bed#:  315-01 Encounter: [id number]    Assessment:  Postpartum Day #2 s/p  at [facility], transferred to [place] to be closer to baby , stable  Baby in NICU    Plan:  Continue routine post partum care  Plan for discharge today    Subjective/Objective   Chief Complaint:     Postpartum    Subjective:   Pt doing well, no complaints  Pain: cramping   Tolerating PO: yes  Voiding: yes  Flatus: yes  BM: no  Ambulating: yes  Breastfeeding:  Yes, pumping  Chest pain: no  Shortness of breath: no  Leg pain: no  Lochia: minimal    Objective:     Vitals: /69   Pulse 85   Temp 98 2 °F (36 8 °C) (Oral)   Resp 18   LMP 2017   SpO2 95%   Breastfeeding?  Yes     No intake or output data in the 24 hours ending 18 3393    Lab Results   Component Value Date    WBC 23 03 (H) 2018    HGB 13 8 2018    HCT 39 1 2018    MCV 88 2018     2018       Meds/Allergies   Current Facility-Administered Medications   Medication Dose Route Frequency    acetaminophen (TYLENOL) tablet 650 mg  650 mg Oral Q6H PRN    benzocaine-menthol-lanolin-aloe (DERMOPLAST) 20-0 5 % topical spray   Topical TID PRN    calcium carbonate (TUMS) chewable tablet 1,000 mg  1,000 mg Oral Daily PRN    diphenhydrAMINE (BENADRYL) tablet 25 mg  25 mg Oral Q6H PRN    docusate sodium (COLACE) capsule 100 mg  100 mg Oral BID    hydrocortisone 1 % cream 1 application  1 application Topical PRN    ibuprofen (MOTRIN) tablet 600 mg  600 mg Oral Q6H PRN    ondansetron (ZOFRAN) injection 4 mg  4 mg Intravenous Q8H PRN    oxyCODONE-acetaminophen (PERCOCET) 5-325 mg per tablet 1 tablet  1 tablet Oral Q4H PRN    oxyCODONE-acetaminophen (PERCOCET) 5-325 mg per tablet 2 tablet  2 tablet Oral Q4H PRN    simethicone (MYLICON) chewable tablet 80 mg  80 mg Oral 4x Daily PRN    witch hazel-glycerin (TUCKS) topical pad 1 pad  1 pad Topical PRN Physical Exam:     Gen: AAOx3, NAD  Lungs: normal respiratory effort on room air  Abd: Soft, non-tender, non-distended, no rebound or guarding  Uterine fundus firm and non-tender,  at the umbilicus   Ext: Non tender        Lynnette Dickson MD, MPH  OB/GYN, PGY2  4/9/2018, 6:32 AM

## 2018-04-09 NOTE — LACTATION NOTE
This note was copied from a baby's chart  Met Mela at her son's bedside in the NICU  Laureen Bateman is pumping for her son, she stated that she has some nipple tenderness when initiating pumping but the tenderness eases up as the session continues  I explained that if the tenderness continues or worsens she should let me know so that I can check flange fit  I reviewed pumping frequency including the importance of pumping at least once over night

## 2018-04-09 NOTE — PLAN OF CARE
COPING     Pt/Family able to verbalize concerns and demonstrate effective coping strategies Completed     Will report anxiety at manageable levels Completed        DISCHARGE PLANNING     Discharge to home or other facility with appropriate resources Completed        DISCHARGE PLANNING - CARE MANAGEMENT     Discharge to post-acute care or home with appropriate resources Completed        INFECTION - ADULT     Absence or prevention of progression during hospitalization Completed        Knowledge Deficit     Patient/family/caregiver demonstrates understanding of disease process, treatment plan, medications, and discharge instructions Completed        PAIN - ADULT     Verbalizes/displays adequate comfort level or baseline comfort level Completed        POSTPARTUM     Experiences normal postpartum course Completed     Appropriate maternal -  bonding Completed     Establishment of infant feeding pattern Completed        SAFETY ADULT     Patient will remain free of falls Completed     Maintain or return to baseline ADL function Completed     Maintain or return mobility status to optimal level Completed        SPIRITUAL CARE     Pt/Family able to move forward in process of forgiving self, others and/or higher power Completed     Patient feels balance and connection with others and/or higher power that empowers the self during times of loss, guilt and fear Completed

## 2018-04-09 NOTE — PLAN OF CARE
INFECTION - ADULT     Absence of fever/infection during neutropenic period Completed          COPING     Pt/Family able to verbalize concerns and demonstrate effective coping strategies Progressing     Will report anxiety at manageable levels Progressing        DISCHARGE PLANNING     Discharge to home or other facility with appropriate resources Progressing        DISCHARGE PLANNING - CARE MANAGEMENT     Discharge to post-acute care or home with appropriate resources Progressing        INFECTION - ADULT     Absence or prevention of progression during hospitalization Progressing        Knowledge Deficit     Patient/family/caregiver demonstrates understanding of disease process, treatment plan, medications, and discharge instructions Progressing        PAIN - ADULT     Verbalizes/displays adequate comfort level or baseline comfort level Progressing        SAFETY ADULT     Patient will remain free of falls Progressing     Maintain or return to baseline ADL function Progressing     Maintain or return mobility status to optimal level Progressing        SPIRITUAL CARE     Pt/Family able to move forward in process of forgiving self, others and/or higher power Progressing     Patient feels balance and connection with others and/or higher power that empowers the self during times of loss, guilt and fear Progressing

## 2018-04-09 NOTE — DISCHARGE SUMMARY
Discharge Summary - OB/GYN  Nhung Elizondo 23 y o  female MRN: [id number]  Unit/Bed#: L&D 320-01 Encounter: [id number]     Admission Date: 4/6/2018      Discharge Date: 4/9/2018     Attending: Oscar Odonnell MD  Attending at Discharge: Dr Saran Jeronimo     Principal Diagnosis: Pregnancy at 40w3d     Secondary Diagnosis: Labor     Procedures: spontaneous vaginal delivery     Anesthesia: epidural     Complications: poor fetal pH suspicious for meconium aspiration syndrome     Patient was admitted in active labor and managed expectantly  She received an epidural for anesthesia  She had a category 2 FHT but was making rapid cervical change  She was AROMed for thick meconium  NICU was made aware  Patient pushed for 2h 53minutes and had a vaginal delivery on 4/7/18 at 0233  Apgars were 3 and 7, arterial cord pH: 6 843 base excess -22 4, venous cord pH: 6 953, base excess -18  3  Parents were made aware at the bedside by NICU physician and informed of the need for head cooling  Baby was transferred to [place]  After patient was stabilized in the immediate postpartum period, she was also transferred to [place] for the remainder of her postpartum course  She was urinating, ambulating, tolerating PO and passing flatus  Her pain was well controlled with oral analgesics  She was discharged home on postpartum day #2      Condition at discharge: good      Discharge instructions/Information to patient and family:   See after visit summary for information provided to patient and family        Provisions for Follow-Up Care:  See after visit summary for information related to follow-up care and any pertinent home health orders        Disposition: See After Visit Summary for discharge disposition information      Planned Readmission: No     Discharge Medications: For a complete list of the patient's medications, please refer to her med rec      Meggan Rahman MD, MPH  OB/GYN, PGY2  4/9/2018, 6:40 AM

## 2018-04-11 ENCOUNTER — TELEPHONE (OUTPATIENT)
Dept: OBGYN CLINIC | Facility: MEDICAL CENTER | Age: 23
End: 2018-04-11

## 2018-05-21 ENCOUNTER — POSTPARTUM VISIT (OUTPATIENT)
Dept: OBGYN CLINIC | Facility: MEDICAL CENTER | Age: 23
End: 2018-05-21

## 2018-05-21 VITALS — DIASTOLIC BLOOD PRESSURE: 66 MMHG | SYSTOLIC BLOOD PRESSURE: 104 MMHG | BODY MASS INDEX: 32.59 KG/M2 | WEIGHT: 184 LBS

## 2018-05-21 DIAGNOSIS — Z30.41 ENCOUNTER FOR SURVEILLANCE OF CONTRACEPTIVE PILLS: ICD-10-CM

## 2018-05-21 DIAGNOSIS — O92.70 LACTATION DISORDER: Primary | ICD-10-CM

## 2018-05-21 PROBLEM — O99.213 OBESITY AFFECTING PREGNANCY, ANTEPARTUM, THIRD TRIMESTER: Status: RESOLVED | Noted: 2018-02-06 | Resolved: 2018-05-21

## 2018-05-21 PROBLEM — Z3A.40 40 WEEKS GESTATION OF PREGNANCY: Status: RESOLVED | Noted: 2018-04-06 | Resolved: 2018-05-21

## 2018-05-21 PROCEDURE — 99024 POSTOP FOLLOW-UP VISIT: CPT | Performed by: OBSTETRICS & GYNECOLOGY

## 2018-05-21 RX ORDER — ACETAMINOPHEN AND CODEINE PHOSPHATE 120; 12 MG/5ML; MG/5ML
1 SOLUTION ORAL DAILY
Qty: 28 TABLET | Refills: 11 | Status: SHIPPED | OUTPATIENT
Start: 2018-05-21 | End: 2020-01-21 | Stop reason: ALTCHOICE

## 2018-05-21 NOTE — PROGRESS NOTES
Subjective     Wes Weber is a 23 y o  y o  female [place] who presents for a postpartum visit  She is 6 weeks postpartum following a spontaneous vaginal delivery on 4/7/18    I have fully reviewed the prenatal and intrapartum course  The delivery was at 40 2 gestational weeks  Outcome: spontaneous vaginal delivery  Anesthesia: epidural  Postpartum course has been complicated by low APGARs at birth, followed by therapeutic hypothermia  Now at [facility] for rehab  Expected to go home 6/1/18  Baby is feeding by breast  Bleeding staining only  Bowel function is normal  Bladder function is normal  Patient is not sexually active  Contraception method is oral progesterone-only contraceptive  Postpartum depression screening: negative  The following portions of the patient's history were reviewed and updated as appropriate: allergies, current medications, past family history, past medical history, past social history, past surgical history and problem list     Review of Systems  Pertinent items are noted in HPI       Objective     /64   Wt 59 4 kg (131 lb)   BMI 22 49 kg/m²    General:   appears stated age, cooperative, alert normal mood and affect   Neck: Neck: normal, supple,trachea midline, no masses   Heart: regular rate and rhythm, S1, S2 normal, no murmur, click, rub or gallop   Lungs: clear to auscultation bilaterally   Breasts: normal appearance, no masses or tenderness   Abdomen: soft, non-tender, without masses or organomegaly   Vulva: normal   Vagina: normal vagina, no discharge, exudate, lesion, or erythema   Urethra: normal   Cervix: Normal, no discharge  Uterus: normal size, contour, position, consistency, mobility, non-tender   Adnexa: no mass, fullness, tenderness     Assessment/Plan     6 week postpartum exam  Pap smear not done at today's visit  1  Contraception: oral progesterone-only contraceptive  2  Instructed to call when starting to wean and will trasnition to [facility]

## 2019-09-11 ENCOUNTER — ANNUAL EXAM (OUTPATIENT)
Dept: OBGYN CLINIC | Facility: MEDICAL CENTER | Age: 24
End: 2019-09-11
Payer: COMMERCIAL

## 2019-09-11 VITALS — WEIGHT: 179.9 LBS | SYSTOLIC BLOOD PRESSURE: 102 MMHG | BODY MASS INDEX: 31.87 KG/M2 | DIASTOLIC BLOOD PRESSURE: 68 MMHG

## 2019-09-11 DIAGNOSIS — Z01.419 ENCNTR FOR GYN EXAM (GENERAL) (ROUTINE) W/O ABN FINDINGS: Primary | ICD-10-CM

## 2019-09-11 PROCEDURE — S0612 ANNUAL GYNECOLOGICAL EXAMINA: HCPCS | Performed by: NURSE PRACTITIONER

## 2019-09-11 NOTE — PROGRESS NOTES
ASSESSMENT & PLAN: Annemarie Cotto is a 20 y o  Yet[place] Raider with normal gynecologic exam     1   Routine well woman exam done today  2  Pap:  The patient's last pap was 2017  It was normal     Pap was not done today  Current ASCCP Guidelines reviewed  3   STD testing  was not done   4  Gardasil recommendations reviewed   5  The following were reviewed in today's visit: breast self exam, adequate intake of calcium and vitamin D, exercise, healthy diet and preconceptual counseling, to start pnv now  6  Rto one year gyn exam, call sooner with + upt  CC:  Annual Gynecologic Examination    HPI: Annemarie Cotto is a 20 y o  Yetta Raider who presents for annual gynecologic examination  She has the following concerns: none  Has a 3-3/1year-old son is thinking of trying to conceive and 2-3 months  Health Maintenance:    She wears her seatbelt routinely  She does perform regular monthly self breast exams  She feels safe at home  Past Medical History:   Diagnosis Date    Abnormal Pap smear of cervix     Atypical Cells    HPV (human papilloma virus) infection     Pap smear abnormality of cervix with LGSIL     Seasonal allergies     Varicella     vaccination       Past Surgical History:   Procedure Laterality Date    EAR SURGERY Right     eustachian tube     WISDOM TOOTH EXTRACTION         OB/Gyn History:    Pt does not have menstrual issues  History of sexually transmitted infection: No   History of abnormal pap smears: No      Patient is currently sexually active      The current method of family planning is condoms      OB History        1    Para   1    Term   1       0    AB   0    Living   1       SAB   0    TAB   0    Ectopic   0    Multiple   0    Live Births   1                 Family History   Problem Relation Age of Onset   Jose E Hero Lung cancer Father     Diabetes Paternal Grandfather     No Known Problems Sister     No Known Problems Brother     Lung cancer Maternal Grandfather     No Known Problems Sister        Social History:  Social History     Socioeconomic History    Marital status: Single     Spouse name: Not on file    Number of children: Not on file    Years of education: Not on file    Highest education level: Not on file   Occupational History    Not on file   Social Needs    Financial resource strain: Not on file    Food insecurity:     Worry: Not on file     Inability: Not on file    Transportation needs:     Medical: Not on file     Non-medical: Not on file   Tobacco Use    Smoking status: Former Smoker     Packs/day: 0 25     Types: Cigarettes    Smokeless tobacco: Never Used    Tobacco comment: Never a smoker   Substance and Sexual Activity    Alcohol use: No    Drug use: No    Sexual activity: Yes     Partners: Male     Birth control/protection: Condom   Lifestyle    Physical activity:     Days per week: Not on file     Minutes per session: Not on file    Stress: Not on file   Relationships    Social connections:     Talks on phone: Not on file     Gets together: Not on file     Attends [employer] service: Not on file     Active member of club or organization: Not on file     Attends meetings of clubs or organizations: Not on file     Relationship status: Not on file    Intimate partner violence:     Fear of current or ex partner: Not on file     Emotionally abused: Not on file     Physically abused: Not on file     Forced sexual activity: Not on file   Other Topics Concern    Not on file   Social History Narrative    Not on file     Patient is     Patient is currently employed at a day care center    Allergies   Allergen Reactions    Pollen Extract     Seasonal Ic  [Cholestatin]          Current Outpatient Medications:     acetaminophen (TYLENOL) 325 mg tablet, Take 2 tablets (650 mg total) by mouth every 6 (six) hours as needed for headaches (Patient not taking: Reported on 9/11/2019), Disp: 30 tablet, Rfl: 0   benzocaine-menthol-lanolin-aloe (DERMOPLAST) 20-0 5 % topical spray, Apply 1 application topically 4 (four) times a day as needed for mild pain (Patient not taking: Reported on 9/11/2019), Disp: , Rfl: 0    docusate sodium (COLACE) 100 mg capsule, Take 1 capsule (100 mg total) by mouth 2 (two) times a day (Patient not taking: Reported on 9/11/2019), Disp: 10 capsule, Rfl: 0    hydrocortisone 1 % cream, Apply 1 application topically as needed for irritation (Patient not taking: Reported on 9/11/2019), Disp: 30 g, Rfl: 0    ibuprofen (MOTRIN) 600 mg tablet, Take 1 tablet (600 mg total) by mouth every 6 (six) hours as needed for mild pain (Patient not taking: Reported on 9/11/2019), Disp: 30 tablet, Rfl: 0    norethindrone (MICRONOR) 0 35 MG tablet, Take 1 tablet (0 35 mg total) by mouth daily (Patient not taking: Reported on 9/11/2019), Disp: 28 tablet, Rfl: 11    Prenatal Vit-Fe Fumarate-FA (PRENATAL 1+1 PO), Take 1 tablet by mouth, Disp: , Rfl:     witch hazel-glycerin (TUCKS) topical pad, Apply 1 pad topically as needed for irritation (Patient not taking: Reported on 9/11/2019), Disp: 40 each, Rfl: 0    Review of Systems:  Constitutional :no fever, feels well, no tiredness, no recent weight gain or loss  ENT: no ear ache, no loss of hearing, no nosebleeds or nasal discharge, no sore throat or hoarseness  Cardiovascular: no complaints of slow or fast heart beat, no chest pain, no palpitations, no leg claudication or lower extremity edema  Respiratory: no complaints of shortness of shortness of breath, no ROBERT  Breasts:no complaints of breast pain, breast lump, or nipple discharge  Gastrointestinal: no complaints of abdominal pain, constipation, nausea, vomiting, or diarrhea or bloody stools  Genitourinary : no complaints of dysuria, incontinence, pelvic pain, no dysmenorrhea, vaginal discharge or abnormal vaginal bleeding and as noted in HPI    Musculoskeletal: no complaints of arthralgia, no myalgia, no joint swelling or stiffness, no limb pain or swelling  Integumentary: no complaints of skin rash or lesion, itching or dry skin  Neurological: no complaints of headache, no confusion, no numbness or tingling, no dizziness or fainting    Objective      /68 (BP Location: Left arm, Patient Position: Sitting, Cuff Size: Standard)   Wt 81 6 kg (179 lb 14 4 oz)   LMP 09/01/2019   BMI 31 87 kg/m²     General:   appears stated age, cooperative, alert normal mood and affect   Neck: normal, supple,trachea midline, no masses   Heart: regular rate and rhythm, S1, S2 normal, no murmur, click, rub or gallop   Lungs: clear to auscultation bilaterally   Breasts: normal appearance, no masses or tenderness   Abdomen: soft, non-tender, without masses or organomegaly   Vulva: normal   Vagina: normal vagina   Urethra: normal   Cervix: Normal, no discharge  Uterus: normal size, contour, position, consistency, mobility, non-tender   Adnexa: normal adnexa   Lymphatic palpation of lymph nodes in neck, axilla, groin and/or other locations: no lymphadenopathy or masses noted   Skin normal skin turgor and no rashes     Psychiatric orientation to person, place, and time: normal  mood and affect: normal

## 2019-12-31 ENCOUNTER — CLINICAL SUPPORT (OUTPATIENT)
Dept: OBGYN CLINIC | Facility: MEDICAL CENTER | Age: 24
End: 2019-12-31
Payer: COMMERCIAL

## 2019-12-31 DIAGNOSIS — Z32.01 POSITIVE URINE PREGNANCY TEST: Primary | ICD-10-CM

## 2019-12-31 PROCEDURE — 36415 COLL VENOUS BLD VENIPUNCTURE: CPT | Performed by: OBSTETRICS & GYNECOLOGY

## 2020-01-02 DIAGNOSIS — Z32.01 POSITIVE BLOOD PREGNANCY TEST: Primary | ICD-10-CM

## 2020-01-02 LAB
B-HCG SERPL-ACNC: NORMAL MIU/ML
PROGEST SERPL-MCNC: 15.7 NG/ML

## 2020-01-09 ENCOUNTER — HOSPITAL ENCOUNTER (OUTPATIENT)
Dept: ULTRASOUND IMAGING | Facility: MEDICAL CENTER | Age: 25
Discharge: HOME | End: 2020-01-09
Payer: COMMERCIAL

## 2020-01-09 DIAGNOSIS — Z32.01 POSITIVE BLOOD PREGNANCY TEST: ICD-10-CM

## 2020-01-09 PROCEDURE — 76801 OB US < 14 WKS SINGLE FETUS: CPT

## 2020-01-21 ENCOUNTER — INITIAL PRENATAL (OUTPATIENT)
Dept: OBGYN CLINIC | Facility: MEDICAL CENTER | Age: 25
End: 2020-01-21
Payer: COMMERCIAL

## 2020-01-21 ENCOUNTER — APPOINTMENT (OUTPATIENT)
Dept: LAB | Facility: MEDICAL CENTER | Age: 25
End: 2020-01-21
Payer: COMMERCIAL

## 2020-01-21 DIAGNOSIS — Z34.91 ENCOUNTER FOR PREGNANCY RELATED EXAMINATION IN FIRST TRIMESTER: Primary | ICD-10-CM

## 2020-01-21 DIAGNOSIS — Z23 NEED FOR INFLUENZA VACCINATION: ICD-10-CM

## 2020-01-21 DIAGNOSIS — Z34.91 ENCOUNTER FOR PREGNANCY RELATED EXAMINATION IN FIRST TRIMESTER: ICD-10-CM

## 2020-01-21 LAB
ABO GROUP BLD: NORMAL
BASOPHILS # BLD AUTO: 0.03 THOUSANDS/ΜL (ref 0–0.1)
BASOPHILS NFR BLD AUTO: 0 % (ref 0–1)
BILIRUB UR QL STRIP: NEGATIVE
BLD GP AB SCN SERPL QL: NEGATIVE
CLARITY UR: CLEAR
COLOR UR: YELLOW
EOSINOPHIL # BLD AUTO: 0.12 THOUSAND/ΜL (ref 0–0.61)
EOSINOPHIL NFR BLD AUTO: 2 % (ref 0–6)
ERYTHROCYTE [DISTWIDTH] IN BLOOD BY AUTOMATED COUNT: 12 % (ref 11.6–15.1)
GLUCOSE UR STRIP-MCNC: NEGATIVE MG/DL
HBV SURFACE AG SER QL: NORMAL
HCT VFR BLD AUTO: 40.2 % (ref 34.8–46.1)
HGB BLD-MCNC: 13.8 G/DL (ref 11.5–15.4)
HGB UR QL STRIP.AUTO: NEGATIVE
IMM GRANULOCYTES # BLD AUTO: 0.02 THOUSAND/UL (ref 0–0.2)
IMM GRANULOCYTES NFR BLD AUTO: 0 % (ref 0–2)
KETONES UR STRIP-MCNC: NEGATIVE MG/DL
LEUKOCYTE ESTERASE UR QL STRIP: NEGATIVE
LYMPHOCYTES # BLD AUTO: 1.76 THOUSANDS/ΜL (ref 0.6–4.47)
LYMPHOCYTES NFR BLD AUTO: 24 % (ref 14–44)
MCH RBC QN AUTO: 31.4 PG (ref 26.8–34.3)
MCHC RBC AUTO-ENTMCNC: 34.3 G/DL (ref 31.4–37.4)
MCV RBC AUTO: 91 FL (ref 82–98)
MONOCYTES # BLD AUTO: 0.37 THOUSAND/ΜL (ref 0.17–1.22)
MONOCYTES NFR BLD AUTO: 5 % (ref 4–12)
NEUTROPHILS # BLD AUTO: 4.97 THOUSANDS/ΜL (ref 1.85–7.62)
NEUTS SEG NFR BLD AUTO: 69 % (ref 43–75)
NITRITE UR QL STRIP: NEGATIVE
NRBC BLD AUTO-RTO: 0 /100 WBCS
PH UR STRIP.AUTO: 7 [PH]
PLATELET # BLD AUTO: 244 THOUSANDS/UL (ref 149–390)
PMV BLD AUTO: 10.3 FL (ref 8.9–12.7)
PROT UR STRIP-MCNC: NEGATIVE MG/DL
RBC # BLD AUTO: 4.4 MILLION/UL (ref 3.81–5.12)
RH BLD: POSITIVE
RPR SER QL: NORMAL
RUBV IGG SERPL IA-ACNC: 100.3 IU/ML
SP GR UR STRIP.AUTO: 1.01 (ref 1–1.03)
SPECIMEN EXPIRATION DATE: NORMAL
UROBILINOGEN UR QL STRIP.AUTO: 0.2 E.U./DL
WBC # BLD AUTO: 7.27 THOUSAND/UL (ref 4.31–10.16)

## 2020-01-21 PROCEDURE — OBC: Performed by: OBSTETRICS & GYNECOLOGY

## 2020-01-21 PROCEDURE — 90471 IMMUNIZATION ADMIN: CPT | Performed by: OBSTETRICS & GYNECOLOGY

## 2020-01-21 PROCEDURE — 80081 OBSTETRIC PANEL INC HIV TSTG: CPT

## 2020-01-21 PROCEDURE — 36415 COLL VENOUS BLD VENIPUNCTURE: CPT

## 2020-01-21 PROCEDURE — 90686 IIV4 VACC NO PRSV 0.5 ML IM: CPT | Performed by: OBSTETRICS & GYNECOLOGY

## 2020-01-21 PROCEDURE — 87086 URINE CULTURE/COLONY COUNT: CPT

## 2020-01-21 PROCEDURE — 81003 URINALYSIS AUTO W/O SCOPE: CPT

## 2020-01-21 NOTE — PROGRESS NOTES
OB INTAKE INTERVIEW      Pt presents for OB intake    OB History    Para Term  AB Living   2 1 1 0 0 1   SAB TAB Ectopic Multiple Live Births   0 0 0 0 1      # Outcome Date GA Lbr Lucian/2nd Weight Sex Delivery Anes PTL Lv   2 Current            1 Term 18 40w3d / 02:53 2955 g (6 lb 8 2 oz) M Vag-Spont EPI N MEDHAT      Complications: Meconium aspiration         Hx of  delivery prior to 36 weeks 6 days: NO     Last Menstrual Period:   Patient's last menstrual period was 2019  Ultrasound date:   20 7 weeks 3 days  Estimated date of delivery:   Estimated Date of Delivery: 20  confirmed by LMP     History of Diabetes: NO  History of Hypertension: NO      Infection Screening: Does the pt have a hx of MRSA? NO    H&P visit scheduled  20  ? Interview education  Information on [facility] Pregnancy Essentials reviewed  Handouts given: Baby and Me phone reji guide  Baby and Me support center  [facility] Ivelisse Watts in Pregnancy information sheet   [facility]  Discussed genetic testing-    - information on SEQ/QUAD screen reviewed-patient undecided about testing  Is aware of time frame for Seq  Screen and need to schedule at [facility] if desired  Referral to genetic counselor given d/t child with congenital heart defect  Discussed Tdap vaccine  Influenza vaccine given today         Depression Screening Follow-up Plan: Patient's depression screening was NEGATIVE  with an [place] score of  0    Labs completed today                The patient was oriented to our practice and all questions were answered    Interviewed by: Guy Schwab RN 20

## 2020-01-21 NOTE — PATIENT INSTRUCTIONS
Pregnancy at 7 to [address]:   What changes are happening to your body:  Pregnancy hormones may cause your body to go through many changes during this stage of your pregnancy  You may feel more tired than usual, and have mood swings, nausea and vomiting, and headaches  Your breasts may feel tender and swollen and you may urinate more frequently  Seek care immediately if:   · You have pain or cramping in your abdomen or low back  · You have heavy vaginal bleeding or clotting  · You pass material that looks like tissue or large clots  Collect the material and bring it with you  Contact your healthcare provider if:   · You have light bleeding  · You have chills or a fever  · You have vaginal itching, burning, or pain  · You have yellow, green, white, or foul-smelling vaginal discharge  · You have pain or burning when you urinate, less urine than usual, or pink or bloody urine  · You have questions or concerns about your condition or care  How to care for yourself at this stage of your pregnancy:   · Manage nausea and vomiting  Avoid fatty and spicy foods  Eat small meals throughout the day instead of large meals  Keyla may help to decrease nausea  Ask your healthcare provider about other ways of decreasing nausea and vomiting  · Eat a variety of healthy foods  Healthy foods include fruits, vegetables, whole-grain breads, low-fat dairy foods, beans, lean meats, and fish  Drink liquids as directed  Ask how much liquid to drink each day and which liquids are best for you  Limit caffeine to less than 200 milligrams each day  Limit your intake of fish to 2 servings each week  Choose fish low in mercury such as canned light tuna, shrimp, salmon, cod, or tilapia  Do not  eat fish high in mercury such as swordfish, tilefish, joaquín mackerel, and shark  · Take prenatal vitamins as directed    Your need for certain vitamins and minerals, such as folic acid, increases during pregnancy  Prenatal vitamins provide some of the extra vitamins and minerals you need  Prenatal vitamins may also help to decrease the risk of certain birth defects  · Ask how much weight you should gain each month  Too much or too little weight gain can be unhealthy for you and your baby  · Do not smoke  If you smoke, it is never too late to quit  Smoking increases your risk of a miscarriage and other health problems during your pregnancy  Smoking can cause your baby to be born too early or weigh less at birth  Ask your healthcare provider for information if you need help quitting  · Do not drink alcohol  Alcohol passes from your body to your baby through the placenta  It can affect your baby's brain development and cause fetal alcohol syndrome (FAS)  FAS is a group of conditions that causes mental, behavior, and growth problems  · Talk to your healthcare provider before you take any medicines  Many medicines may harm your baby if you take them when you are pregnant  Do not take any medicines, vitamins, herbs, or supplements without first talking to your healthcare provider  Never use illegal or street drugs (such as marijuana or cocaine) while you are pregnant  Safety tips during pregnancy:   · Avoid hot tubs and saunas  Do not use a hot tub or sauna while you are pregnant, especially during your first trimester  Hot tubs and saunas may raise your baby's temperature and increase the risk of birth defects  · Avoid toxoplasmosis  This is an infection caused by eating raw meat or being around infected cat feces  It can cause birth defects, miscarriages, and other problems  Wash your hands after you touch raw meat  Make sure any meat is well-cooked before you eat it  Avoid raw eggs and unpasteurized milk  Use gloves or ask someone else to clean your cat's litter box while you are pregnant    Changes that are happening with your baby:  By 10 weeks, your baby will be about 2 ½ inches long from the top of the head to the rump (baby's bottom)  Your baby weighs about ½ ounce  Major body organs, such as the brain, heart, and lungs, are forming  Your baby's facial features are also starting to form  What you need to know about prenatal care:  Prenatal care is a series of visits with your healthcare provider throughout your pregnancy  During the first 28 weeks of your pregnancy, you will see your healthcare provider once a month  Prenatal care can help prevent problems during pregnancy and childbirth  Your healthcare provider will ask questions about your health and any previous pregnancies you have had  He will also ask about any medicines you are taking  You may also need any of the following:  · A pap smear  will be done to check your cervix for abnormal cells  The cervix is the narrow opening at the bottom of your uterus  The cervix meets the top part of the vagina  · A pelvic exam  allows your healthcare provider to see your cervix (the bottom part of your uterus)  Your healthcare provider uses a speculum to gently open your vagina  He will check the size and shape of your uterus  · Blood tests  may be done to check for anemia or blood type  Your healthcare provider may also order other blood tests to check if you are immune to certain diseases such as Hepatitis B  He may also recommend an HIV test     · Urine tests  may also be done to check for signs of infection  · Your blood pressure and weight  will be checked  © 2017 [address] [employer] Information is for End User's use only and may not be sold, redistributed or otherwise used for commercial purposes  All illustrations and images included in CareNotes® are the copyrighted property of A D A M [employer] Inc  or Sincere Kennedy  The above information is an  only  It is not intended as medical advice for individual conditions or treatments   Talk to your doctor, nurse or pharmacist before following any medical regimen to see if it is safe and effective for you  Pregnancy at 11 to 14 Weeks   AMBULATORY CARE:   What changes are happening to your body: You are now at the end of your first trimester and entering your second trimester  Morning sickness usually goes away by this time  You may have other symptoms such as fatigue, frequent urination, and headaches  You may have gained between 2 to 4 pounds by now  Seek care immediately if:   · You have pain or cramping in your abdomen or low back  · You have heavy vaginal bleeding or clotting  · You pass material that looks like tissue or large clots  Collect the material and bring it with you  Contact your healthcare provider if:   · You cannot keep food or drinks down, and you are losing weight  · You have light bleeding  · You have chills or a fever  · You have vaginal itching, burning, or pain  · You have yellow, green, white, or foul-smelling vaginal discharge  · You have pain or burning when you urinate, less urine than usual, or pink or bloody urine  · You have questions or concerns about your condition or care  How to care for yourself at this stage of your pregnancy:   · Get plenty of rest   You may feel more tired than normal  You may need to take naps or go to bed earlier  · Manage nausea and vomiting  Avoid fatty and spicy foods  Eat small meals throughout the day instead of large meals  Keyla may help to decrease nausea  Ask your healthcare provider about other ways of decreasing nausea and vomiting  · Eat a variety of healthy foods  Healthy foods include fruits, vegetables, whole-grain breads, low-fat dairy foods, beans, lean meats, and fish  Drink liquids as directed  Ask how much liquid to drink each day and which liquids are best for you  Limit caffeine to less than 200 milligrams each day  Limit your intake of fish to 2 servings each week  Choose fish low in mercury such as canned light tuna, shrimp, salmon, cod, or tilapia   Do not  eat fish high in mercury such as swordfish, tilefish, joaquín mackerel, and shark  · Take prenatal vitamins as directed  Your need for certain vitamins and minerals, such as folic acid, increases during pregnancy  Prenatal vitamins provide some of the extra vitamins and minerals you need  Prenatal vitamins may also help to decrease the risk of certain birth defects  · Do not smoke  If you smoke, it is never too late to quit  Smoking increases your risk of a miscarriage and other health problems during your pregnancy  Smoking can cause your baby to be born too early or weigh less at birth  Ask your healthcare provider for information if you need help quitting  · Do not drink alcohol  Alcohol passes from your body to your baby through the placenta  It can affect your baby's brain development and cause fetal alcohol syndrome (FAS)  FAS is a group of conditions that causes mental, behavior, and growth problems  · Talk to your healthcare provider before you take any medicines  Many medicines may harm your baby if you take them when you are pregnant  Do not take any medicines, vitamins, herbs, or supplements without first talking to your healthcare provider  Never use illegal or street drugs (such as marijuana or cocaine) while you are pregnant  Safety tips during pregnancy:   · Avoid hot tubs and saunas  Do not use a hot tub or sauna while you are pregnant, especially during your first trimester  Hot tubs and saunas may raise your baby's temperature and increase the risk of birth defects  · Avoid toxoplasmosis  This is an infection caused by eating raw meat or being around infected cat feces  It can cause birth defects, miscarriages, and other problems  Wash your hands after you touch raw meat  Make sure any meat is well-cooked before you eat it  Avoid raw eggs and unpasteurized milk  Use gloves or ask someone else to clean your cat's litter box while you are pregnant    Changes that are happening with your baby: Your baby has fully formed fingernails and toenails  Your baby's heartbeat can now be heard  Ask your healthcare provider if you can listen to your baby's heartbeat  By week 14, your baby is over 4 inches long from the top of the head to the rump (baby's bottom)  Your baby weighs over 3 ounces  What you need to know about prenatal care:  During the first 28 weeks of your pregnancy, you will see your healthcare provider once a month  Prenatal care can help prevent problems during pregnancy and childbirth  Your healthcare provider will check your blood pressure and weight  You may also need any of the following:  · A urine test  may also be done to check for sugar and protein  These can be signs of gestational diabetes or infection  · Genetic disorders screening tests  may be offered to you  This screening test checks your baby's risk of genetic disorders such as Down syndrome  The screening test includes a blood test and ultrasound  · Your baby's heart rate  will be checked  © 2017 [address] [employer]  Information is for End User's use only and may not be sold, redistributed or otherwise used for commercial purposes  All illustrations and images included in CareNotes® are the copyrighted property of A [employer] M , Inc  or Sincere Kennedy  The above information is an  only  It is not intended as medical advice for individual conditions or treatments  Talk to your doctor, nurse or pharmacist before following any medical regimen to see if it is safe and effective for you

## 2020-01-22 LAB
BACTERIA UR CULT: NORMAL
HIV 1+2 AB+HIV1 P24 AG SERPL QL IA: NORMAL

## 2020-02-05 ENCOUNTER — TELEPHONE (OUTPATIENT)
Dept: OBGYN CLINIC | Facility: MEDICAL CENTER | Age: 25
End: 2020-02-05

## 2020-02-05 NOTE — TELEPHONE ENCOUNTER
Per Neyda precert is not required for [facility]  All services will be covered at 60% after deductible is met up until OOP

## 2020-02-17 ENCOUNTER — INITIAL PRENATAL (OUTPATIENT)
Dept: OBGYN CLINIC | Facility: MEDICAL CENTER | Age: 25
End: 2020-02-17

## 2020-02-17 VITALS — DIASTOLIC BLOOD PRESSURE: 64 MMHG | WEIGHT: 178.7 LBS | BODY MASS INDEX: 31.66 KG/M2 | SYSTOLIC BLOOD PRESSURE: 120 MMHG

## 2020-02-17 DIAGNOSIS — Z11.3 SCREENING FOR STDS (SEXUALLY TRANSMITTED DISEASES): Primary | ICD-10-CM

## 2020-02-17 DIAGNOSIS — Z34.92 SECOND TRIMESTER PREGNANCY: ICD-10-CM

## 2020-02-17 DIAGNOSIS — Z87.42 HISTORY OF ABNORMAL CERVICAL PAP SMEAR: ICD-10-CM

## 2020-02-17 DIAGNOSIS — O09.299 CURRENT SINGLETON PREGNANCY WITH HISTORY OF CONGENITAL HEART DISEASE IN PRIOR CHILD, ANTEPARTUM: ICD-10-CM

## 2020-02-17 PROCEDURE — G0145 SCR C/V CYTO,THINLAYER,RESCR: HCPCS | Performed by: OBSTETRICS & GYNECOLOGY

## 2020-02-17 PROCEDURE — 87491 CHLMYD TRACH DNA AMP PROBE: CPT | Performed by: OBSTETRICS & GYNECOLOGY

## 2020-02-17 PROCEDURE — PNV: Performed by: OBSTETRICS & GYNECOLOGY

## 2020-02-17 PROCEDURE — 87624 HPV HI-RISK TYP POOLED RSLT: CPT | Performed by: OBSTETRICS & GYNECOLOGY

## 2020-02-17 PROCEDURE — 87591 N.GONORRHOEAE DNA AMP PROB: CPT | Performed by: OBSTETRICS & GYNECOLOGY

## 2020-02-17 NOTE — PROGRESS NOTES
Rob Mao is a 30y o  year old  at [address] for first prenatal visit  Pregnancy was desired  She is currently taking PNV    Nausea No Vomiting No   Exam done today - see OB flowsheet  Pap done Yes  Gonorrhea and Chlamydia sent  Labs reviewed    Genetic testing - declined     OB complications - prior child with ASD and VSD - recommend fetal echo  Prior child with meconium aspiration - states does not want to go past her due date   We discussed possible IOL after 39 weeks       Pt has been counseled re diet, exercise, weight gain, foods to avoid, vaccines in pregnancy, trisomy screening, travel precautions to include seat belt use and VTE risk reduction  She has been provided our pregnancy packet which includes how and when to contact providers, medication recommendations, dietary suggestions, breastfeeding information as well as websites for additional information, hospital and delivery concerns

## 2020-02-18 LAB
C TRACH DNA SPEC QL NAA+PROBE: NEGATIVE
HPV HR 12 DNA CVX QL NAA+PROBE: NEGATIVE
HPV16 DNA CVX QL NAA+PROBE: NEGATIVE
HPV18 DNA CVX QL NAA+PROBE: NEGATIVE
N GONORRHOEA DNA SPEC QL NAA+PROBE: NEGATIVE

## 2020-02-20 LAB
LAB AP GYN PRIMARY INTERPRETATION: NORMAL
Lab: NORMAL

## 2020-03-13 ENCOUNTER — ROUTINE PRENATAL (OUTPATIENT)
Dept: OBGYN CLINIC | Facility: MEDICAL CENTER | Age: 25
End: 2020-03-13

## 2020-03-13 VITALS — BODY MASS INDEX: 32.42 KG/M2 | SYSTOLIC BLOOD PRESSURE: 110 MMHG | WEIGHT: 183 LBS | DIASTOLIC BLOOD PRESSURE: 60 MMHG

## 2020-03-13 DIAGNOSIS — Z34.92 SECOND TRIMESTER PREGNANCY: Primary | ICD-10-CM

## 2020-03-13 DIAGNOSIS — Z3A.16 16 WEEKS GESTATION OF PREGNANCY: ICD-10-CM

## 2020-03-13 PROCEDURE — PNV: Performed by: NURSE PRACTITIONER

## 2020-03-13 NOTE — PROGRESS NOTES
Ayah Quispe is a 30y o  year old  at 16w3d for routine prenatal visit    + FM, no vaginal bleeding, contractions, or LOF  Complaints: No   Most recent ultrasound and labs reviewed  Level 2 on   Declines all genetic testing

## 2020-03-13 NOTE — PROGRESS NOTES
Dorcas Garcia is a 30y o  year old  at 16w3d for routine prenatal visit    + FM, no vaginal bleeding, contractions, or LOF  Complaints: No   Most recent ultrasound and labs reviewed  Declines all genetic testing  Level 2 on   Has a 3 yo son, not finding out the gender

## 2020-04-09 ENCOUNTER — TELEMEDICINE (OUTPATIENT)
Dept: OBGYN CLINIC | Facility: MEDICAL CENTER | Age: 25
End: 2020-04-09

## 2020-04-09 VITALS — DIASTOLIC BLOOD PRESSURE: 72 MMHG | SYSTOLIC BLOOD PRESSURE: 124 MMHG | WEIGHT: 184 LBS | BODY MASS INDEX: 32.59 KG/M2

## 2020-04-09 DIAGNOSIS — O09.299 CURRENT SINGLETON PREGNANCY WITH HISTORY OF CONGENITAL HEART DISEASE IN PRIOR CHILD, ANTEPARTUM: ICD-10-CM

## 2020-04-09 DIAGNOSIS — Z34.92 SECOND TRIMESTER PREGNANCY: ICD-10-CM

## 2020-04-09 DIAGNOSIS — Z3A.20 20 WEEKS GESTATION OF PREGNANCY: Primary | ICD-10-CM

## 2020-04-09 PROCEDURE — PNV: Performed by: OBSTETRICS & GYNECOLOGY

## 2020-04-22 ENCOUNTER — TELEPHONE (OUTPATIENT)
Dept: PERINATAL CARE | Facility: CLINIC | Age: 25
End: 2020-04-22

## 2020-04-23 ENCOUNTER — ROUTINE PRENATAL (OUTPATIENT)
Dept: PERINATAL CARE | Facility: CLINIC | Age: 25
End: 2020-04-23
Payer: COMMERCIAL

## 2020-04-23 VITALS
SYSTOLIC BLOOD PRESSURE: 124 MMHG | HEIGHT: 64 IN | TEMPERATURE: 98.4 F | DIASTOLIC BLOOD PRESSURE: 66 MMHG | WEIGHT: 192.2 LBS | HEART RATE: 94 BPM | BODY MASS INDEX: 32.81 KG/M2

## 2020-04-23 DIAGNOSIS — Z36.89 ENCOUNTER FOR FETAL ANATOMIC SURVEY: ICD-10-CM

## 2020-04-23 DIAGNOSIS — Z3A.22 22 WEEKS GESTATION OF PREGNANCY: ICD-10-CM

## 2020-04-23 DIAGNOSIS — Z34.92 SECOND TRIMESTER PREGNANCY: ICD-10-CM

## 2020-04-23 DIAGNOSIS — O09.299 CURRENT SINGLETON PREGNANCY WITH HISTORY OF CONGENITAL HEART DISEASE IN PRIOR CHILD, ANTEPARTUM: ICD-10-CM

## 2020-04-23 PROBLEM — O99.210 OBESITY AFFECTING PREGNANCY: Status: ACTIVE | Noted: 2020-04-23

## 2020-04-23 PROCEDURE — 76811 OB US DETAILED SNGL FETUS: CPT | Performed by: OBSTETRICS & GYNECOLOGY

## 2020-04-23 PROCEDURE — 99241 PR OFFICE CONSULTATION NEW/ESTAB PATIENT 15 MIN: CPT | Performed by: OBSTETRICS & GYNECOLOGY

## 2020-04-28 ENCOUNTER — TELEPHONE (OUTPATIENT)
Dept: PERINATAL CARE | Facility: CLINIC | Age: 25
End: 2020-04-28

## 2020-05-06 ENCOUNTER — TELEMEDICINE (OUTPATIENT)
Dept: OBGYN CLINIC | Facility: MEDICAL CENTER | Age: 25
End: 2020-05-06

## 2020-05-06 VITALS — WEIGHT: 189.2 LBS | BODY MASS INDEX: 32.48 KG/M2 | DIASTOLIC BLOOD PRESSURE: 66 MMHG | SYSTOLIC BLOOD PRESSURE: 113 MMHG

## 2020-05-06 DIAGNOSIS — O09.299 CURRENT SINGLETON PREGNANCY WITH HISTORY OF CONGENITAL HEART DISEASE IN PRIOR CHILD, ANTEPARTUM: ICD-10-CM

## 2020-05-06 DIAGNOSIS — Z34.92 SECOND TRIMESTER PREGNANCY: Primary | ICD-10-CM

## 2020-05-06 PROCEDURE — PNV: Performed by: OBSTETRICS & GYNECOLOGY

## 2020-05-11 ENCOUNTER — TELEPHONE (OUTPATIENT)
Dept: PERINATAL CARE | Facility: OTHER | Age: 25
End: 2020-05-11

## 2020-05-11 PROBLEM — Z3A.25 25 WEEKS GESTATION OF PREGNANCY: Status: ACTIVE | Noted: 2020-04-23

## 2020-05-12 ENCOUNTER — ROUTINE PRENATAL (OUTPATIENT)
Dept: PERINATAL CARE | Facility: OTHER | Age: 25
End: 2020-05-12
Payer: COMMERCIAL

## 2020-05-12 VITALS
WEIGHT: 192.6 LBS | BODY MASS INDEX: 32.88 KG/M2 | HEART RATE: 106 BPM | SYSTOLIC BLOOD PRESSURE: 108 MMHG | HEIGHT: 64 IN | TEMPERATURE: 97.6 F | DIASTOLIC BLOOD PRESSURE: 72 MMHG

## 2020-05-12 DIAGNOSIS — Z3A.25 25 WEEKS GESTATION OF PREGNANCY: ICD-10-CM

## 2020-05-12 DIAGNOSIS — O09.299 CURRENT SINGLETON PREGNANCY WITH HISTORY OF CONGENITAL HEART DISEASE IN PRIOR CHILD, ANTEPARTUM: Primary | ICD-10-CM

## 2020-05-12 PROBLEM — R87.612 PAP SMEAR ABNORMALITY OF CERVIX WITH LGSIL: Status: ACTIVE | Noted: 2020-05-12

## 2020-05-12 PROCEDURE — 76827 ECHO EXAM OF FETAL HEART: CPT | Performed by: OBSTETRICS & GYNECOLOGY

## 2020-05-12 PROCEDURE — 76825 ECHO EXAM OF FETAL HEART: CPT | Performed by: OBSTETRICS & GYNECOLOGY

## 2020-05-12 PROCEDURE — 93325 DOPPLER ECHO COLOR FLOW MAPG: CPT | Performed by: OBSTETRICS & GYNECOLOGY

## 2020-06-03 ENCOUNTER — ROUTINE PRENATAL (OUTPATIENT)
Dept: OBGYN CLINIC | Facility: MEDICAL CENTER | Age: 25
End: 2020-06-03
Payer: COMMERCIAL

## 2020-06-03 VITALS — WEIGHT: 195 LBS | DIASTOLIC BLOOD PRESSURE: 60 MMHG | SYSTOLIC BLOOD PRESSURE: 104 MMHG | BODY MASS INDEX: 33.47 KG/M2

## 2020-06-03 DIAGNOSIS — Z3A.28 28 WEEKS GESTATION OF PREGNANCY: ICD-10-CM

## 2020-06-03 DIAGNOSIS — Z34.93 THIRD TRIMESTER PREGNANCY: ICD-10-CM

## 2020-06-03 DIAGNOSIS — Z23 NEED FOR TDAP VACCINATION: Primary | ICD-10-CM

## 2020-06-03 LAB
BASOPHILS # BLD AUTO: 0.02 THOUSANDS/ΜL (ref 0–0.1)
BASOPHILS NFR BLD AUTO: 0 % (ref 0–1)
EOSINOPHIL # BLD AUTO: 0.14 THOUSAND/ΜL (ref 0–0.61)
EOSINOPHIL NFR BLD AUTO: 2 % (ref 0–6)
ERYTHROCYTE [DISTWIDTH] IN BLOOD BY AUTOMATED COUNT: 12.9 % (ref 11.6–15.1)
GLUCOSE 1H P 50 G GLC PO SERPL-MCNC: 108 MG/DL
HCT VFR BLD AUTO: 34.1 % (ref 34.8–46.1)
HGB BLD-MCNC: 11.3 G/DL (ref 11.5–15.4)
IMM GRANULOCYTES # BLD AUTO: 0.1 THOUSAND/UL (ref 0–0.2)
IMM GRANULOCYTES NFR BLD AUTO: 1 % (ref 0–2)
LYMPHOCYTES # BLD AUTO: 1.7 THOUSANDS/ΜL (ref 0.6–4.47)
LYMPHOCYTES NFR BLD AUTO: 18 % (ref 14–44)
MCH RBC QN AUTO: 30.9 PG (ref 26.8–34.3)
MCHC RBC AUTO-ENTMCNC: 33.1 G/DL (ref 31.4–37.4)
MCV RBC AUTO: 93 FL (ref 82–98)
MONOCYTES # BLD AUTO: 0.47 THOUSAND/ΜL (ref 0.17–1.22)
MONOCYTES NFR BLD AUTO: 5 % (ref 4–12)
NEUTROPHILS # BLD AUTO: 7.08 THOUSANDS/ΜL (ref 1.85–7.62)
NEUTS SEG NFR BLD AUTO: 74 % (ref 43–75)
NRBC BLD AUTO-RTO: 0 /100 WBCS
PLATELET # BLD AUTO: 172 THOUSANDS/UL (ref 149–390)
PMV BLD AUTO: 9.7 FL (ref 8.9–12.7)
RBC # BLD AUTO: 3.66 MILLION/UL (ref 3.81–5.12)
WBC # BLD AUTO: 9.51 THOUSAND/UL (ref 4.31–10.16)

## 2020-06-03 PROCEDURE — PNV: Performed by: NURSE PRACTITIONER

## 2020-06-03 PROCEDURE — 36415 COLL VENOUS BLD VENIPUNCTURE: CPT | Performed by: NURSE PRACTITIONER

## 2020-06-03 PROCEDURE — 82950 GLUCOSE TEST: CPT | Performed by: NURSE PRACTITIONER

## 2020-06-03 PROCEDURE — 90715 TDAP VACCINE 7 YRS/> IM: CPT

## 2020-06-03 PROCEDURE — 90471 IMMUNIZATION ADMIN: CPT

## 2020-06-03 PROCEDURE — 85025 COMPLETE CBC W/AUTO DIFF WBC: CPT | Performed by: NURSE PRACTITIONER

## 2020-06-17 ENCOUNTER — ROUTINE PRENATAL (OUTPATIENT)
Dept: OBGYN CLINIC | Facility: MEDICAL CENTER | Age: 25
End: 2020-06-17

## 2020-06-17 VITALS — WEIGHT: 197 LBS | DIASTOLIC BLOOD PRESSURE: 60 MMHG | SYSTOLIC BLOOD PRESSURE: 102 MMHG | BODY MASS INDEX: 33.81 KG/M2

## 2020-06-17 DIAGNOSIS — Z34.93 THIRD TRIMESTER PREGNANCY: ICD-10-CM

## 2020-06-17 DIAGNOSIS — Z3A.30 30 WEEKS GESTATION OF PREGNANCY: Primary | ICD-10-CM

## 2020-06-17 PROCEDURE — PNV: Performed by: NURSE PRACTITIONER

## 2020-06-29 ENCOUNTER — TELEPHONE (OUTPATIENT)
Dept: PERINATAL CARE | Facility: CLINIC | Age: 25
End: 2020-06-29

## 2020-06-29 PROBLEM — Z3A.31 31 WEEKS GESTATION OF PREGNANCY: Status: ACTIVE | Noted: 2020-04-23

## 2020-06-30 ENCOUNTER — ULTRASOUND (OUTPATIENT)
Dept: PERINATAL CARE | Facility: OTHER | Age: 25
End: 2020-06-30
Payer: COMMERCIAL

## 2020-06-30 VITALS
HEART RATE: 92 BPM | DIASTOLIC BLOOD PRESSURE: 66 MMHG | SYSTOLIC BLOOD PRESSURE: 98 MMHG | BODY MASS INDEX: 33.81 KG/M2 | HEIGHT: 64 IN

## 2020-06-30 DIAGNOSIS — Z36.89 ENCOUNTER FOR ULTRASOUND TO CHECK FETAL GROWTH: ICD-10-CM

## 2020-06-30 DIAGNOSIS — O09.299 CURRENT SINGLETON PREGNANCY WITH HISTORY OF CONGENITAL HEART DISEASE IN PRIOR CHILD, ANTEPARTUM: Primary | ICD-10-CM

## 2020-06-30 DIAGNOSIS — Z3A.31 31 WEEKS GESTATION OF PREGNANCY: ICD-10-CM

## 2020-06-30 PROCEDURE — 76816 OB US FOLLOW-UP PER FETUS: CPT | Performed by: OBSTETRICS & GYNECOLOGY

## 2020-07-01 ENCOUNTER — ROUTINE PRENATAL (OUTPATIENT)
Dept: OBGYN CLINIC | Facility: MEDICAL CENTER | Age: 25
End: 2020-07-01

## 2020-07-01 VITALS
TEMPERATURE: 98.1 F | SYSTOLIC BLOOD PRESSURE: 112 MMHG | BODY MASS INDEX: 34.12 KG/M2 | DIASTOLIC BLOOD PRESSURE: 64 MMHG | WEIGHT: 198.8 LBS

## 2020-07-01 DIAGNOSIS — O09.299 CURRENT SINGLETON PREGNANCY WITH HISTORY OF CONGENITAL HEART DISEASE IN PRIOR CHILD, ANTEPARTUM: ICD-10-CM

## 2020-07-01 DIAGNOSIS — Z34.93 THIRD TRIMESTER PREGNANCY: Primary | ICD-10-CM

## 2020-07-01 PROCEDURE — PNV: Performed by: OBSTETRICS & GYNECOLOGY

## 2020-07-01 NOTE — PROGRESS NOTES
Luigi Willoughby is a 30y o  year old  at 32w1d for routine prenatal visit    + FM, no vaginal bleeding, contractions, or LOF  Complaints: No   Most recent ultrasound and labs reviewed    Breech  - aware we will check position by 36-37 weeks / if breech options are scheduled C/S vs ECV   [address] reviewed   Call group changes discussed

## 2020-07-15 ENCOUNTER — ROUTINE PRENATAL (OUTPATIENT)
Dept: OBGYN CLINIC | Facility: MEDICAL CENTER | Age: 25
End: 2020-07-15

## 2020-07-15 VITALS — WEIGHT: 198 LBS | DIASTOLIC BLOOD PRESSURE: 70 MMHG | SYSTOLIC BLOOD PRESSURE: 114 MMHG | BODY MASS INDEX: 33.99 KG/M2

## 2020-07-15 DIAGNOSIS — Z3A.34 34 WEEKS GESTATION OF PREGNANCY: Primary | ICD-10-CM

## 2020-07-15 DIAGNOSIS — O09.299 CURRENT SINGLETON PREGNANCY WITH HISTORY OF CONGENITAL HEART DISEASE IN PRIOR CHILD, ANTEPARTUM: ICD-10-CM

## 2020-07-15 DIAGNOSIS — Z34.93 THIRD TRIMESTER PREGNANCY: ICD-10-CM

## 2020-07-15 PROCEDURE — PNV: Performed by: OBSTETRICS & GYNECOLOGY

## 2020-07-15 NOTE — PROGRESS NOTES
Assessment  30 y o  Dinora Gujohnathan at 34w1d presenting for routine prenatal visit  Plan  Diagnoses and all orders for this visit:    34 weeks gestation of pregnancy  Third trimester pregnancy  -  labor precautions  - FKC encouraged  - GBS next visit  - Needs position check next visit  - Return in 2wks for PN    Current duncan pregnancy with history of congenital heart disease in prior child, antepartum  - Nml echo  - Follows with MFM      ____________________________________________________________        Subjective    Kenn Re is a 30 y o  Threasa Guise at 34w1d who presents for routine prenatal visit  She is reporting sciatica discomfort on her right side  Sharp pain; starts in back and shoots down right leg  Intermittent  No hx of same, but thinks she had this last pregnancy  She denies contractions, loss of fluid, or vaginal bleeding  She feels regular fetal movements  Pregnancy Problems:  Patient Active Problem List   Diagnosis    Current duncan pregnancy with history of congenital heart disease in prior child, antepartum    Third trimester pregnancy    34 weeks gestation of pregnancy    Obesity affecting pregnancy    Pap smear abnormality of cervix with LGSIL         Objective  /70   Wt 89 8 kg (198 lb)   LMP 2019   BMI 33 99 kg/m²     FHT: 155 BPM (165bpm by MA; accels noted to here with fetal movement)   Uterine Size: size equals dates     Physical Exam:  Physical Exam   Constitutional: She appears well-developed and well-nourished  No distress  HENT:   Head: Normocephalic and atraumatic  Eyes: No scleral icterus  Pulmonary/Chest: Effort normal  No accessory muscle usage  No respiratory distress  Abdominal: She exhibits distension (gravid)  There is no tenderness  There is no rebound and no guarding  Skin: Skin is warm and dry  No rash noted  No erythema  Psychiatric: She has a normal mood and affect   Her behavior is normal

## 2020-07-20 ENCOUNTER — ROUTINE PRENATAL (OUTPATIENT)
Dept: OBGYN CLINIC | Facility: MEDICAL CENTER | Age: 25
End: 2020-07-20

## 2020-07-20 VITALS
WEIGHT: 198.9 LBS | TEMPERATURE: 98.2 F | BODY MASS INDEX: 34.14 KG/M2 | DIASTOLIC BLOOD PRESSURE: 62 MMHG | SYSTOLIC BLOOD PRESSURE: 110 MMHG

## 2020-07-20 DIAGNOSIS — Z34.83 PRENATAL CARE, SUBSEQUENT PREGNANCY, THIRD TRIMESTER: Primary | ICD-10-CM

## 2020-07-20 PROCEDURE — PNV: Performed by: OBSTETRICS & GYNECOLOGY

## 2020-07-20 NOTE — PROGRESS NOTES
Pt doing well  Sonogram today confirms vertex   We went over the visitation policy of the hospital currently and explained it is evolving       precautions given   Went over GBS next visit  36-37 weeks  Follow up in 2 weeks

## 2020-07-29 ENCOUNTER — ROUTINE PRENATAL (OUTPATIENT)
Dept: OBGYN CLINIC | Facility: MEDICAL CENTER | Age: 25
End: 2020-07-29

## 2020-07-29 VITALS
BODY MASS INDEX: 34.48 KG/M2 | SYSTOLIC BLOOD PRESSURE: 110 MMHG | DIASTOLIC BLOOD PRESSURE: 70 MMHG | WEIGHT: 200.9 LBS | TEMPERATURE: 97.8 F

## 2020-07-29 DIAGNOSIS — Z34.93 THIRD TRIMESTER PREGNANCY: Primary | ICD-10-CM

## 2020-07-29 DIAGNOSIS — Z3A.36 36 WEEKS GESTATION OF PREGNANCY: ICD-10-CM

## 2020-07-29 PROCEDURE — PNV: Performed by: OBSTETRICS & GYNECOLOGY

## 2020-07-29 PROCEDURE — 87653 STREP B DNA AMP PROBE: CPT | Performed by: OBSTETRICS & GYNECOLOGY

## 2020-07-29 NOTE — PROGRESS NOTES
Merlyn Roe is a 30y o  year old  at 36w1d for routine prenatal visit  GBS done Yes  PCN allergy No  Labor precautions given  [address] reviewed

## 2020-07-31 LAB — GP B STREP DNA SPEC QL NAA+PROBE: NORMAL

## 2020-08-04 ENCOUNTER — ROUTINE PRENATAL (OUTPATIENT)
Dept: OBGYN CLINIC | Facility: MEDICAL CENTER | Age: 25
End: 2020-08-04

## 2020-08-04 VITALS
BODY MASS INDEX: 35.09 KG/M2 | TEMPERATURE: 97.6 F | WEIGHT: 204.4 LBS | SYSTOLIC BLOOD PRESSURE: 110 MMHG | DIASTOLIC BLOOD PRESSURE: 68 MMHG

## 2020-08-04 DIAGNOSIS — Z34.93 THIRD TRIMESTER PREGNANCY: Primary | ICD-10-CM

## 2020-08-04 PROCEDURE — PNV: Performed by: OBSTETRICS & GYNECOLOGY

## 2020-08-04 NOTE — PROGRESS NOTES
John Swan is a 30y o  year old  at 37w0d for routine prenatal visit    + FM, no vaginal bleeding, contractions, or LOF  Complaints: No   Most recent ultrasound and labs reviewed    [address] and labor precautions given

## 2020-08-11 ENCOUNTER — ROUTINE PRENATAL (OUTPATIENT)
Dept: OBGYN CLINIC | Facility: MEDICAL CENTER | Age: 25
End: 2020-08-11

## 2020-08-11 VITALS
TEMPERATURE: 97.9 F | WEIGHT: 206.6 LBS | SYSTOLIC BLOOD PRESSURE: 110 MMHG | BODY MASS INDEX: 35.46 KG/M2 | DIASTOLIC BLOOD PRESSURE: 70 MMHG

## 2020-08-11 DIAGNOSIS — Z34.93 THIRD TRIMESTER PREGNANCY: Primary | ICD-10-CM

## 2020-08-11 DIAGNOSIS — Z3A.38 38 WEEKS GESTATION OF PREGNANCY: ICD-10-CM

## 2020-08-11 PROCEDURE — PNV: Performed by: OBSTETRICS & GYNECOLOGY

## 2020-08-11 NOTE — PROGRESS NOTES
Fausto Jameson is a 30y o  year old  at 38w0d for routine prenatal visit    + FM, no vaginal bleeding, contractions, or LOF  Complaints: No   Most recent ultrasound and labs reviewed    CVX 3/80/-1, soft, mid  Labor precautions reviewed

## 2020-08-17 ENCOUNTER — ROUTINE PRENATAL (OUTPATIENT)
Dept: OBGYN CLINIC | Facility: MEDICAL CENTER | Age: 25
End: 2020-08-17

## 2020-08-17 VITALS — DIASTOLIC BLOOD PRESSURE: 74 MMHG | SYSTOLIC BLOOD PRESSURE: 110 MMHG | WEIGHT: 205.2 LBS | BODY MASS INDEX: 35.22 KG/M2

## 2020-08-17 DIAGNOSIS — Z34.93 THIRD TRIMESTER PREGNANCY: ICD-10-CM

## 2020-08-17 DIAGNOSIS — Z3A.38 38 WEEKS GESTATION OF PREGNANCY: Primary | ICD-10-CM

## 2020-08-17 PROCEDURE — PNV: Performed by: NURSE PRACTITIONER

## 2020-08-17 NOTE — PROGRESS NOTES
Karlee Loaiza is a 30y o  year old  at 38w7d for routine prenatal visit    + FM, no vaginal bleeding, contractions, or LOF  Complaints: No   Most recent ultrasound and labs reviewed  Has decided she wants IOL now  D/w dr otero, given apt for  at 6am with Dr Ximena Lopes  Cx/3/80/-1 soft mad midline

## 2020-08-19 ENCOUNTER — ANESTHESIA EVENT (INPATIENT)
Dept: ANESTHESIOLOGY | Facility: HOSPITAL | Age: 25
End: 2020-08-19
Payer: COMMERCIAL

## 2020-08-19 ENCOUNTER — HOSPITAL ENCOUNTER (INPATIENT)
Facility: HOSPITAL | Age: 25
LOS: 2 days | Discharge: HOME | End: 2020-08-21
Attending: OBSTETRICS & GYNECOLOGY | Admitting: OBSTETRICS & GYNECOLOGY
Payer: COMMERCIAL

## 2020-08-19 ENCOUNTER — HOSPITAL ENCOUNTER (OUTPATIENT)
Dept: LABOR AND DELIVERY | Facility: HOSPITAL | Age: 25
Discharge: HOME | End: 2020-08-19
Payer: COMMERCIAL

## 2020-08-19 ENCOUNTER — ANESTHESIA (INPATIENT)
Dept: ANESTHESIOLOGY | Facility: HOSPITAL | Age: 25
End: 2020-08-19
Payer: COMMERCIAL

## 2020-08-19 DIAGNOSIS — Z34.93 THIRD TRIMESTER PREGNANCY: ICD-10-CM

## 2020-08-19 PROBLEM — Z3A.39 39 WEEKS GESTATION OF PREGNANCY: Status: ACTIVE | Noted: 2020-04-23

## 2020-08-19 PROBLEM — O13.3 GESTATIONAL HYPERTENSION, THIRD TRIMESTER: Status: ACTIVE | Noted: 2020-08-19

## 2020-08-19 LAB
ABO GROUP BLD: NORMAL
ALBUMIN SERPL BCP-MCNC: 2.8 G/DL (ref 3.5–5)
ALP SERPL-CCNC: 127 U/L (ref 46–116)
ALT SERPL W P-5'-P-CCNC: 15 U/L (ref 12–78)
ANION GAP SERPL CALCULATED.3IONS-SCNC: 15 MMOL/L (ref 4–13)
AST SERPL W P-5'-P-CCNC: 12 U/L (ref 5–45)
BASE EXCESS BLDCOA CALC-SCNC: -4 MMOL/L (ref 3–11)
BASE EXCESS BLDCOV CALC-SCNC: -1.4 MMOL/L (ref 1–9)
BILIRUB SERPL-MCNC: 0.4 MG/DL (ref 0.2–1)
BLD GP AB SCN SERPL QL: NEGATIVE
BUN SERPL-MCNC: 7 MG/DL (ref 5–25)
CALCIUM SERPL-MCNC: 8.9 MG/DL (ref 8.3–10.1)
CHLORIDE SERPL-SCNC: 104 MMOL/L (ref 100–108)
CO2 SERPL-SCNC: 22 MMOL/L (ref 21–32)
CREAT SERPL-MCNC: 0.68 MG/DL (ref 0.6–1.3)
CREAT UR-MCNC: 68.8 MG/DL
ERYTHROCYTE [DISTWIDTH] IN BLOOD BY AUTOMATED COUNT: 13.2 % (ref 11.6–15.1)
ERYTHROCYTE [DISTWIDTH] IN BLOOD BY AUTOMATED COUNT: 13.2 % (ref 11.6–15.1)
GFR SERPL CREATININE-BSD FRML MDRD: 121 ML/MIN/1.73SQ M
GLUCOSE SERPL-MCNC: 79 MG/DL (ref 65–140)
HCO3 BLDCOA-SCNC: 24 MMOL/L (ref 17.3–27.3)
HCO3 BLDCOV-SCNC: 25.3 MMOL/L (ref 12.2–28.6)
HCT VFR BLD AUTO: 33.3 % (ref 34.8–46.1)
HCT VFR BLD AUTO: 34.9 % (ref 34.8–46.1)
HGB BLD-MCNC: 11 G/DL (ref 11.5–15.4)
HGB BLD-MCNC: 11.3 G/DL (ref 11.5–15.4)
MCH RBC QN AUTO: 29.9 PG (ref 26.8–34.3)
MCH RBC QN AUTO: 30 PG (ref 26.8–34.3)
MCHC RBC AUTO-ENTMCNC: 32.4 G/DL (ref 31.4–37.4)
MCHC RBC AUTO-ENTMCNC: 33 G/DL (ref 31.4–37.4)
MCV RBC AUTO: 91 FL (ref 82–98)
MCV RBC AUTO: 92 FL (ref 82–98)
O2 CT VFR BLDCOA CALC: 3.4 ML/DL
OXYHGB MFR BLDCOA: 13.5 %
OXYHGB MFR BLDCOV: 40.3 %
PCO2 BLDCOA: 54 MM[HG] (ref 30–60)
PCO2 BLDCOV: 49.2 MM HG (ref 27–43)
PH BLDCOA: 7.26 [PH] (ref 7.23–7.43)
PH BLDCOV: 7.33 [PH] (ref 7.19–7.49)
PLATELET # BLD AUTO: 101 THOUSANDS/UL (ref 149–390)
PLATELET # BLD AUTO: 96 THOUSANDS/UL (ref 149–390)
PMV BLD AUTO: 10 FL (ref 8.9–12.7)
PMV BLD AUTO: 9.8 FL (ref 8.9–12.7)
PO2 BLDCOA: 10.4 MM HG (ref 5–25)
PO2 BLDCOV: 19 MM HG (ref 15–45)
POTASSIUM SERPL-SCNC: 3.7 MMOL/L (ref 3.5–5.3)
PROT SERPL-MCNC: 6.7 G/DL (ref 6.4–8.2)
PROT UR-MCNC: 14 MG/DL
PROT/CREAT UR: 0.2 MG/G{CREAT} (ref 0–0.1)
RBC # BLD AUTO: 3.67 MILLION/UL (ref 3.81–5.12)
RBC # BLD AUTO: 3.78 MILLION/UL (ref 3.81–5.12)
RH BLD: POSITIVE
RPR SER QL: NORMAL
SAO2 % BLDCOV: 10.3 ML/DL
SODIUM SERPL-SCNC: 141 MMOL/L (ref 136–145)
SPECIMEN EXPIRATION DATE: NORMAL
WBC # BLD AUTO: 12.84 THOUSAND/UL (ref 4.31–10.16)
WBC # BLD AUTO: 9.7 THOUSAND/UL (ref 4.31–10.16)

## 2020-08-19 PROCEDURE — 0KQM0ZZ REPAIR PERINEUM MUSCLE, OPEN APPROACH: ICD-10-PCS | Performed by: OBSTETRICS & GYNECOLOGY

## 2020-08-19 PROCEDURE — 86901 BLOOD TYPING SEROLOGIC RH(D): CPT | Performed by: OBSTETRICS & GYNECOLOGY

## 2020-08-19 PROCEDURE — 3E033VJ INTRODUCTION OF OTHER HORMONE INTO PERIPHERAL VEIN, PERCUTANEOUS APPROACH: ICD-10-PCS | Performed by: OBSTETRICS & GYNECOLOGY

## 2020-08-19 PROCEDURE — 99024 POSTOP FOLLOW-UP VISIT: CPT | Performed by: OBSTETRICS & GYNECOLOGY

## 2020-08-19 PROCEDURE — 10907ZC DRAINAGE OF AMNIOTIC FLUID, THERAPEUTIC FROM PRODUCTS OF CONCEPTION, VIA NATURAL OR ARTIFICIAL OPENING: ICD-10-PCS | Performed by: OBSTETRICS & GYNECOLOGY

## 2020-08-19 PROCEDURE — 80053 COMPREHEN METABOLIC PANEL: CPT | Performed by: OBSTETRICS & GYNECOLOGY

## 2020-08-19 PROCEDURE — 86850 RBC ANTIBODY SCREEN: CPT | Performed by: OBSTETRICS & GYNECOLOGY

## 2020-08-19 PROCEDURE — 82805 BLOOD GASES W/O2 SATURATION: CPT | Performed by: OBSTETRICS & GYNECOLOGY

## 2020-08-19 PROCEDURE — 59400 OBSTETRICAL CARE: CPT | Performed by: OBSTETRICS & GYNECOLOGY

## 2020-08-19 PROCEDURE — 86900 BLOOD TYPING SEROLOGIC ABO: CPT | Performed by: OBSTETRICS & GYNECOLOGY

## 2020-08-19 PROCEDURE — 82570 ASSAY OF URINE CREATININE: CPT | Performed by: OBSTETRICS & GYNECOLOGY

## 2020-08-19 PROCEDURE — 85027 COMPLETE CBC AUTOMATED: CPT | Performed by: ANESTHESIOLOGY

## 2020-08-19 PROCEDURE — 84156 ASSAY OF PROTEIN URINE: CPT | Performed by: OBSTETRICS & GYNECOLOGY

## 2020-08-19 PROCEDURE — 4A1HXCZ MONITORING OF PRODUCTS OF CONCEPTION, CARDIAC RATE, EXTERNAL APPROACH: ICD-10-PCS | Performed by: OBSTETRICS & GYNECOLOGY

## 2020-08-19 PROCEDURE — 86592 SYPHILIS TEST NON-TREP QUAL: CPT | Performed by: OBSTETRICS & GYNECOLOGY

## 2020-08-19 PROCEDURE — 85027 COMPLETE CBC AUTOMATED: CPT | Performed by: OBSTETRICS & GYNECOLOGY

## 2020-08-19 RX ORDER — ACETAMINOPHEN 325 MG/1
650 TABLET ORAL EVERY 6 HOURS PRN
Status: DISCONTINUED | OUTPATIENT
Start: 2020-08-19 | End: 2020-08-21 | Stop reason: HOSPADM

## 2020-08-19 RX ORDER — PHENYLEPHRINE HCL IN 0.9% NACL 1 MG/10 ML
SYRINGE (ML) INTRAVENOUS
Status: DISPENSED
Start: 2020-08-19 | End: 2020-08-20

## 2020-08-19 RX ORDER — DIAPER,BRIEF,INFANT-TODD,DISP
1 EACH MISCELLANEOUS 4 TIMES DAILY PRN
Status: DISCONTINUED | OUTPATIENT
Start: 2020-08-19 | End: 2020-08-21 | Stop reason: HOSPADM

## 2020-08-19 RX ORDER — LIDOCAINE HYDROCHLORIDE AND EPINEPHRINE 20; 5 MG/ML; UG/ML
INJECTION, SOLUTION EPIDURAL; INFILTRATION; INTRACAUDAL; PERINEURAL AS NEEDED
Status: DISCONTINUED | OUTPATIENT
Start: 2020-08-19 | End: 2020-08-19 | Stop reason: HOSPADM

## 2020-08-19 RX ORDER — CALCIUM CARBONATE 200(500)MG
1000 TABLET,CHEWABLE ORAL 3 TIMES DAILY PRN
Status: DISCONTINUED | OUTPATIENT
Start: 2020-08-19 | End: 2020-08-21 | Stop reason: HOSPADM

## 2020-08-19 RX ORDER — SODIUM CHLORIDE, SODIUM LACTATE, POTASSIUM CHLORIDE, CALCIUM CHLORIDE 600; 310; 30; 20 MG/100ML; MG/100ML; MG/100ML; MG/100ML
125 INJECTION, SOLUTION INTRAVENOUS CONTINUOUS
Status: DISCONTINUED | OUTPATIENT
Start: 2020-08-19 | End: 2020-08-19

## 2020-08-19 RX ORDER — ONDANSETRON 2 MG/ML
4 INJECTION INTRAMUSCULAR; INTRAVENOUS EVERY 6 HOURS PRN
Status: DISCONTINUED | OUTPATIENT
Start: 2020-08-19 | End: 2020-08-21 | Stop reason: HOSPADM

## 2020-08-19 RX ORDER — SIMETHICONE 80 MG
80 TABLET,CHEWABLE ORAL EVERY 6 HOURS PRN
Status: DISCONTINUED | OUTPATIENT
Start: 2020-08-19 | End: 2020-08-21 | Stop reason: HOSPADM

## 2020-08-19 RX ORDER — LIDOCAINE HYDROCHLORIDE AND EPINEPHRINE 15; 5 MG/ML; UG/ML
INJECTION, SOLUTION EPIDURAL AS NEEDED
Status: DISCONTINUED | OUTPATIENT
Start: 2020-08-19 | End: 2020-08-19 | Stop reason: HOSPADM

## 2020-08-19 RX ORDER — IBUPROFEN 600 MG/1
600 TABLET ORAL EVERY 6 HOURS PRN
Status: DISCONTINUED | OUTPATIENT
Start: 2020-08-19 | End: 2020-08-21 | Stop reason: HOSPADM

## 2020-08-19 RX ORDER — DIPHENHYDRAMINE HCL 25 MG
25 TABLET ORAL EVERY 6 HOURS PRN
Status: DISCONTINUED | OUTPATIENT
Start: 2020-08-19 | End: 2020-08-21 | Stop reason: HOSPADM

## 2020-08-19 RX ORDER — DOCUSATE SODIUM 100 MG/1
100 CAPSULE, LIQUID FILLED ORAL 2 TIMES DAILY
Status: DISCONTINUED | OUTPATIENT
Start: 2020-08-19 | End: 2020-08-21 | Stop reason: HOSPADM

## 2020-08-19 RX ORDER — ROPIVACAINE HYDROCHLORIDE 2 MG/ML
INJECTION, SOLUTION EPIDURAL; INFILTRATION; PERINEURAL CONTINUOUS PRN
Status: DISCONTINUED | OUTPATIENT
Start: 2020-08-19 | End: 2020-08-19 | Stop reason: HOSPADM

## 2020-08-19 RX ORDER — ONDANSETRON 2 MG/ML
4 INJECTION INTRAMUSCULAR; INTRAVENOUS EVERY 6 HOURS PRN
Status: DISCONTINUED | OUTPATIENT
Start: 2020-08-19 | End: 2020-08-19

## 2020-08-19 RX ORDER — ROPIVACAINE HYDROCHLORIDE 2 MG/ML
INJECTION, SOLUTION EPIDURAL; INFILTRATION; PERINEURAL
Status: COMPLETED
Start: 2020-08-19 | End: 2020-08-19

## 2020-08-19 RX ORDER — BISACODYL 10 MG
10 SUPPOSITORY, RECTAL RECTAL DAILY PRN
Status: DISCONTINUED | OUTPATIENT
Start: 2020-08-19 | End: 2020-08-21 | Stop reason: HOSPADM

## 2020-08-19 RX ORDER — OXYTOCIN/RINGER'S LACTATE 30/500 ML
1-30 PLASTIC BAG, INJECTION (ML) INTRAVENOUS
Status: DISCONTINUED | OUTPATIENT
Start: 2020-08-19 | End: 2020-08-19

## 2020-08-19 RX ADMIN — WITCH HAZEL 1 PAD: 500 SOLUTION RECTAL; TOPICAL at 20:34

## 2020-08-19 RX ADMIN — BENZOCAINE AND LEVOMENTHOL: 200; 5 SPRAY TOPICAL at 20:34

## 2020-08-19 RX ADMIN — SODIUM CHLORIDE, SODIUM LACTATE, POTASSIUM CHLORIDE, AND CALCIUM CHLORIDE 125 ML/HR: .6; .31; .03; .02 INJECTION, SOLUTION INTRAVENOUS at 07:45

## 2020-08-19 RX ADMIN — ROPIVACAINE HYDROCHLORIDE 10 ML/HR: 2 INJECTION, SOLUTION EPIDURAL; INFILTRATION at 17:25

## 2020-08-19 RX ADMIN — LIDOCAINE HYDROCHLORIDE,EPINEPHRINE BITARTRATE 5 ML: 20; .005 INJECTION, SOLUTION EPIDURAL; INFILTRATION; INTRACAUDAL; PERINEURAL at 17:14

## 2020-08-19 RX ADMIN — LIDOCAINE HYDROCHLORIDE AND EPINEPHRINE 5 ML: 15; 5 INJECTION, SOLUTION EPIDURAL at 17:07

## 2020-08-19 RX ADMIN — ROPIVACAINE HYDROCHLORIDE: 2 INJECTION, SOLUTION EPIDURAL; INFILTRATION at 17:30

## 2020-08-19 RX ADMIN — Medication 2 MILLI-UNITS/MIN: at 07:44

## 2020-08-19 RX ADMIN — IBUPROFEN 600 MG: 600 TABLET ORAL at 20:33

## 2020-08-19 RX ADMIN — DOCUSATE SODIUM 100 MG: 100 CAPSULE, LIQUID FILLED ORAL at 20:33

## 2020-08-19 NOTE — ANESTHESIA PROCEDURE NOTES
Epidural Block    Patient location during procedure: OB  Reason for block: procedure for pain and at surgeon's request  Staffing  Anesthesiologist: Rikki Faye DO  Performed: anesthesiologist   Preanesthetic Checklist  Completed: patient identified, site marked, surgical consent, pre-op evaluation, timeout performed, IV checked, risks and benefits discussed and monitors and equipment checked  Epidural  Patient position: sitting  Prep: Betadine  Patient monitoring: heart rate, continuous pulse ox and frequent blood pressure checks  Approach: midline  Location: lumbar (1-5)  Injection technique: BROOKS saline  Needle  Needle type: Tuohy   Needle gauge: 18 G  Catheter size: 20 G  Catheter at skin depth: 10 cm  Test dose: negative  Assessment  Sensory level: T10  Events: Transient pressurenegative aspiration for heme, no paresthesia on injection and negative aspiration for CSF  patient tolerated the procedure well with no immediate complications

## 2020-08-19 NOTE — OB LABOR/OXYTOCIN SAFETY PROGRESS
Oxytocin Safety Progress Check Note - Javi Solorio 30 y o  female MRN: [id number]    Unit/Bed#: L&D 325-01 Encounter: [id number]    Dose (satish-units/min) Oxytocin: 18 satish-units/min  Contraction Frequency (minutes): 2  Contraction Quality: Mild  Tachysystole: No   Cervical Dilation: 3-4        Cervical Effacement: 80  Fetal Station: -2  Baseline Rate: 130 bpm     FHR Category: Category I               Vital Signs:   Vitals:    08/19/20 0625   BP: 122/82   Pulse: (!) 109   Resp: 18   Temp: 98 3 °F (36 8 °C)           Notes/comments:   Patient is comfortable at this time and does not report feeling any changes or more discomfort  Exam deferred at this time  The plan is for the patient to get an epidural shortly  Once she is comfortable, we will recheck her and AROM her if appropriate then           Ra Braga MD 8/19/2020 3:21 PM

## 2020-08-19 NOTE — H&P
H&P Exam - Obstetrics   Brandi Singletary 30 y o  female MRN: [id number]  Unit/Bed#: L&D 325-01 Encounter: [id number]      History of Present Illness     Chief Complaint: Induction of labor    HPI:  Brandi Singletary is a 30 y o   female with an JORDAN of 2020, by Last Menstrual Period at 39w1d weeks gestation who is being admitted for Induction of labor  Her current obstetrical history is significant for obesity  Contractions: None  Leakage of fluid: None  Bleeding: None  Fetal movement: present      PREGNANCY COMPLICATIONS: obesity    OB History    Para Term  AB Living   2 1 1 0 0 1   SAB TAB Ectopic Multiple Live Births   0 0 0 0 1      # Outcome Date GA Lbr Lucian/2nd Weight Sex Delivery Anes PTL Lv   2 Current            1 Term 18 40w3d / 02:53 2955 g (6 lb 8 2 oz) M Vag-Spont EPI N MEDHAT      Complications: Meconium aspiration       Baby complications/comments: none    Review of Systems    Historical Information   Past Medical History:   Diagnosis Date    HPV (human papilloma virus) infection     Pap smear abnormality of cervix with LGSIL     Seasonal allergies     Varicella     vaccination     Past Surgical History:   Procedure Laterality Date    EAR SURGERY Right     eustachian tube     TYMPANOPLASTY      WISDOM TOOTH EXTRACTION       Social History   Social History     Substance and Sexual Activity   Alcohol Use Not Currently     Social History     Substance and Sexual Activity   Drug Use Never     Social History     Tobacco Use   Smoking Status Never Smoker   Smokeless Tobacco Never Used     Family History: non-contributory    Meds/Allergies      Medications Prior to Admission   Medication    acetaminophen (TYLENOL) 325 mg tablet    Prenatal Vit-Fe Fumarate-FA (PRENATAL 1+1 PO)        Allergies   Allergen Reactions    Pollen Extract     Seasonal Ic  [Cholestatin]        OBJECTIVE:    Vitals: Blood pressure 122/82, pulse (!) 109, temperature 98 3 °F (36 8 °C), temperature source Oral, resp  rate 18, height 5' 4" (1 626 m), weight 93 kg (205 lb), last menstrual period 2019, currently breastfeeding  Body mass index is 35 19 kg/m²  Physical Exam    Cervix: Dilation: 3cm, Effacement:  80%, Station:  -2, Consistency: soft and Position:  posterior       Fetal heart rate: Baseline: 150 bpm, Variability: Moderate 6 - 25 bpm, Accelerations: Reactive and Category 1       Morenci: none       Prenatal Labs: I have personally reviewed pertinent reports  , Blood Type:   Lab Results   Component Value Date    ABO O 2020     , D (Rh type):   Lab Results   Component Value Date    RH Positive 2020     Lab Results   Component Value Date    HCT 33 3 (L) 2020    HGB 11 0 (L) 2020       Invasive Devices     Peripheral Intravenous Line            Peripheral IV 20 Left Hand less than 1 day                  Assessment/Plan     ASSESSMENT:   IUP at 39w1d weeks gestation for eIOL  PLAN:   1) Admit   2) CBC, RPR, Blood Type   3) Analgesia and/or epidural at patient request   4) Anticipate    5) Pitocin and AROM when able      This patient will be an INPATIENT  and I certify the anticipated length of stay is >2 Midnights      Davion Cedeno MD  2020  9:37 AM

## 2020-08-19 NOTE — OB LABOR/OXYTOCIN SAFETY PROGRESS
Oxytocin Safety Progress Check Note - Sheryl Farrell 30 y o  female MRN: [id number]    Unit/Bed#: L&D 325-01 Encounter: [id number]    Dose (satish-units/min) Oxytocin: 14 satish-units/min  Contraction Frequency (minutes): 2-3  Contraction Quality: Mild  Tachysystole: No   Cervical Dilation: 3-4        Cervical Effacement: 80  Fetal Station: -2  Baseline Rate: 140 bpm                     Vital Signs:   Vitals:    08/19/20 0625   BP: 122/82   Pulse: (!) 109   Resp: 18   Temp: 98 3 °F (36 8 °C)           Notes/comments:    Pt making minimal change, plan to get epidural when more uncomfortable and AROM once head is no longer as ballotable   D/w Dr Enmanuel Arboleda, DO 8/19/2020 1:55 PM

## 2020-08-19 NOTE — ANESTHESIA PREPROCEDURE EVALUATION
Procedure:  LABOR ANALGESIA    Relevant Problems   ANESTHESIA (within normal limits)      Other   (+) Obesity affecting pregnancy        Physical Exam    Airway    Mallampati score: II  TM Distance: >3 FB  Neck ROM: full     Dental   No notable dental hx     Cardiovascular  Rhythm: regular, Rate: normal, Cardiovascular exam normal    Pulmonary  Pulmonary exam normal Breath sounds clear to auscultation,     Other Findings        Anesthesia Plan  ASA Score- 3     Anesthesia Type- epidural with ASA Monitors  Additional Monitors:   Airway Plan:           Plan Factors-Exercise tolerance (METS): >4 METS  Chart reviewed  EKG reviewed  Imaging results reviewed  Existing labs reviewed  Patient summary reviewed  Patient is not a current smoker  Patient instructed to abstain from smoking on day of procedure  Patient did not smoke on day of surgery  Induction-     Postoperative Plan-     Informed Consent- Anesthetic plan and risks discussed with patient

## 2020-08-19 NOTE — OB LABOR/OXYTOCIN SAFETY PROGRESS
Oxytocin Safety Progress Check Note - [place] 30 y o  female MRN: [id number]    Unit/Bed#: L&D 325-01 Encounter: [id number]    Dose (satish-units/min) Oxytocin: 20 satish-units/min  Contraction Frequency (minutes): 2-3  Contraction Quality: Moderate  Tachysystole: No   Cervical Dilation: 9        Cervical Effacement: 100  Fetal Station: 0  Baseline Rate: 130 bpm     Category II, variable declerations               Vital Signs:   Vitals:    08/19/20 1835   BP: 109/64   Pulse: 78   Resp:    Temp:            Notes/comments:        Sagrario Anderson MD 8/19/2020 7:04 PM

## 2020-08-19 NOTE — DISCHARGE INSTRUCTIONS
Self Care After Delivery   AMBULATORY CARE:   The postpartum period  is the period of time from delivery to about 6 weeks  During this time you may experience many physical and emotional changes  It is important to understand what is normal and when you need to call your healthcare provider  It is also important to know how to care for yourself during this time  Call 911 for any of the following:   · You soak through 1 pad in 15 minutes, have blurry vision, clammy or pale skin, and feel faint  · You faint or lose consciousness  · Your heart is beating faster than normal      · You have trouble breathing  · You cough up blood  Seek care immediately if:   · You soak through 1 or more pads in an hour, or pass blood clots larger than a quarter from your vagina  · Your leg is painful, red, and larger than normal      · You have severe abdominal pain  · You have a bad headache or changes in your vision  · Your episiotomy or C section incision is red, swollen, bleeding, or draining pus  · Your incision comes apart  · You see or hear things that are not there, or have thoughts of harming yourself or your baby  Contact your obstetrician or midwife if:   · You have a fever  · You have new or worsening pain in your abdomen or vagina  · You continue to have the baby blues 10 days after you deliver  · You have trouble sleeping  · You have foul-smelling discharge from your vagina  · You have pain or burning when you urinate  · You do not have a bowel movement for 3 days or more  · You have nausea or are vomiting  · You have hard lumps or red streaks over your breasts  · You have cracked nipples or bleed from your nipples  · You have questions or concerns about your condition or care  Physical changes:   The following are normal changes after you give birth:  · Pain in the area between your anus and vagina    · Breast pain    · Constipation or hemorrhoids    · Hot or cold flashes    · Vaginal bleeding or discharge    · Mild to moderate abdominal cramping    · Difficulty controlling bowel movements or urine  Emotional changes: The following are symptoms of the baby blues, or normal emotions after you give birth  The changes in your emotions may be caused by a drop in hormone levels after you deliver  If these symptoms last longer than 1 to 2 weeks after you give birth, you may have postpartum depression  · Feeling irritable    · Feeling sad    · Crying for no reason    · Feeling anxious  Breast care for nursing mothers: You may have sore breasts for 3 to 6 days after you give birth  This happens as your milk begins to fill your breasts  You may also have sore breasts if you do not breastfeed frequently  Do the following to care for your breasts:  · Apply a moist, warm, compress to your breast as directed  This may help soothe your breasts  Make sure the washcloth is not too hot before you apply it to your breast      · Nurse your baby or pump your milk frequently  This may prevent clogged milk ducts  Ask your healthcare provider how often to nurse or pump  · Massage your breasts as directed  This may help increase your milk flow  Gently rub your breasts in a circular motion before you breastfeed  You may need to gently squeeze your breast or nipple to help release milk  You can also use a breast pump to help release milk from your breast      · Wash your breasts with warm water only  Do not put soap on your nipples  Soap may cause your nipples to become dry  · Apply lanolin cream to your nipples as directed  Lanolin cream may add moisture to your skin and prevent nipple dryness  Always  wash off lanolin cream with warm water before you breastfeed  · Place pads in your bra  Your nipples may leak milk when you are not breastfeeding  You can place pads inside of your bra to help prevent leaking onto your clothing   Ask your healthcare provider where to purchase bra pads  · Get breastfeeding support if needed  There are healthcare providers who can answer questions about breastfeeding and provide you with support  Ask your healthcare provider who you can contact if you need breastfeeding support  Breast care for non-nursing mothers:  Milk will fill your breasts even if you bottle feed your baby  Do the following to help stop your milk from filling your breasts and causing pain:  · Wear a bra with support at all times  A sports bra or a tight-fitting bra will help stop your milk from coming in  · Apply ice on each breast for 15 to 20 minutes every hour or as directed  Use an ice pack, or put crushed ice in a plastic bag  Cover it with a towel  Ice helps your milk ducts shrink  · Keep your breasts away from warm water  Warm water will make it easier for milk to fill your breasts  Stand with your breasts away from warm water in the shower  · Limit how much you touch your breasts  This will prevent them from filling with milk  Perineum care: Your perineum is the area between your rectum and vagina  It is normal to have swelling and pain in this area after you give birth  If you had an episiotomy, your healthcare provider may give you special instructions  · Clean your perineum after you use the bathroom  This may prevent infection and help with healing  Use a spray bottle with warm water to clean your perineum  You may also gently spray warm water against your perineum when you urinate  Always wipe front to back  · Take a sitz bath as directed  A sitz bath may help relieve swelling and pain  Fill your bath tub or bucket with water up to your hips and sit in the water  Use cold water for 2 days after you deliver  Then use warm water  Ask your healthcare provider for more information about a sitz bath  · Apply ice packs for the first 24 hours or as directed  Use a plastic glove filled with ice or buy an ice pack   Wrap the ice pack or plastic glove in a small towel or wash cloth  Place the ice pack on your perineum for 20 minutes at a time  · Sit on a donut-shaped pillow  This may relieve pressure on your perineum when you sit  · Use wipes with medicine or take pills as directed  Your healthcare provider may tell you to use witch hazel pads  You can place witch hazel pads in the refrigerator before you apply them to your perineum  He may also tell you to take NSAIDs  Ask your healthcare provider how often to take pills or use wipes with medicine  · Do not go swimming or take tub baths for 4 to 6 weeks or as directed  This will help prevent an infection in your vagina or uterus  Bowel and bladder care: It may take 3 to 5 days to have a bowel movement after you deliver your baby  You can do the following to prevent or manage constipation, and get control of your bowel or bladder:  · Take stool softeners as directed  A stool softener is medicine that will make your bowel movements softer  This may prevent or relieve constipation  A stool softener may also make bowel movements less painful  · Drink plenty of liquids  Ask how much liquid to drink each day and which liquids are best for you  Liquids may help prevent constipation  · Eat foods high in fiber  Examples include fruits, vegetables, grains, beans, and lentils  Ask your healthcare provider how much fiber you need each day  Fiber may prevent constipation  · Do Kegel exercises as directed  Kegel exercises will help strengthen the muscles that control bowel movements and urination  Ask your healthcare provider for more information on Kegel exercises  · Apply cold compresses or medicine to hemorrhoids as directed  This may relieve swelling and pain  Your healthcare provider may tell you to apply ice or wipes with medicine to your hemorrhoids  He may also tell you to use a sitz bath   Ask your healthcare for more information on how to manage hemorrhoids  Nutrition:  Good nutrition is important in the postpartum period  It will help you return to a healthy weight, increase your energy levels, and prevent constipation  It will also help you get enough nutrients and calories if you are going to breastfeed your baby  · Eat a variety of healthy foods  Healthy foods include fruits, vegetables, whole-grain breads, low-fat dairy products, beans, lean meats, and fish  You may need 500 to 700 additional calories each day if you breastfeed your baby  You may also need extra protein  · Limit foods with added sugar and high amounts of fat  These foods are high in calories and low in healthy nutrients  Read food labels so you know how much sugar and fat is in the food you want to eat  · Drink 8 to 10 glasses of water per day  Water will help you make plenty of milk for your baby  It will also help prevent constipation  Drink a glass of water every time you breastfeed your baby  · Take vitamins as directed  Ask your healthcare provider what vitamins you need  · Limit caffeine and alcohol if you are breastfeeding  Caffeine and alcohol can get into your breast milk  Caffeine and alcohol can make your baby fussy  They can also interfere with your baby's sleep  Ask your healthcare provider if you can drink alcohol or caffeine  Rest and sleep: You may feel very tired in the postpartum period  Enough sleep will help you heal and give you energy to care for your baby  The following may help you get sleep and rest:  · Nap when your baby naps  Your baby may nap several times during the day  Get rest during this time  · Limit visitors  Many people may want to see you and your baby  Ask friends or family to visit on different days  This will give you time to rest      · Do not plan too much for one day  Put off household chores so that you have time to rest  Gradually do more each day  · Ask for help from family, friends, or neighbors    Ask them to help you with laundry, cleaning, or errands  Also ask someone to watch the baby while you take a nap or relax  Ask your partner to help with the care of your baby  Pump some of your breast milk so your partner can feed your baby during the night  Exercise after delivery:  Wait until your healthcare provider says it is okay to exercise  Exercise can help you lose weight, increase your energy levels, and manage your mood  It can also prevent constipation and blood clots  Start with gentle exercises such as walking  Do more as you have more energy  You may need to avoid abdominal exercises for 1 to 2 weeks after you deliver  Talk to your healthcare provider about an exercise plan that is right for you  Sexual activity after delivery:   · Do not have sex until your healthcare provider says it is okay  You may need to wait 4 to 6 weeks before you have sex  This may prevent infection and allow time to heal      · Your menstrual cycle may begin as soon as 3 weeks after you deliver  Your period may be delayed if you breastfeed your baby  You can become pregnant before you get your first postpartum period  Talk to your healthcare provider about birth control that is right for you  Some types of birth control are not safe during breastfeeding  For support and more information:  Join a support group for new mothers  Ask for help from family and friends with chores, errands, and care of your baby  · Office of Women's Health, [place] Department of Health and Human Services  [address], [id number] [place] [address]  [address], [id number] [place] [address]  Phone: 7- 514 - [phone]  Web Address: www womenshealth gov  · [facility] [address] [facility] , [address] [place] [address]  [address]  Web Address: [employer]  org/pregnancy/postpartum-care  aspx  Follow up with your obstetrician or midwife as directed:   You will need to follow up with your healthcare provider in 2 to 6 weeks after delivery  Write down your questions so you remember to ask them at your visits  © 2017 [address] [employer] Information is for End User's use only and may not be sold, redistributed or otherwise used for commercial purposes  All illustrations and images included in CareNotes® are the copyrighted property of A D A M , Inc  or [employer]  The above information is an  only  It is not intended as medical advice for individual conditions or treatments  Talk to your doctor, nurse or pharmacist before following any medical regimen to see if it is safe and effective for you

## 2020-08-19 NOTE — OB LABOR/OXYTOCIN SAFETY PROGRESS
Oxytocin Safety Progress Check Note - Rossy Boss 30 y o  female MRN: [id number]    Unit/Bed#: L&D 325-01 Encounter: [id number]    Dose (satish-units/min) Oxytocin: 20 satish-units/min  Contraction Frequency (minutes): 2-3  Contraction Quality: Moderate  Tachysystole: No   Cervical Dilation: 4        Cervical Effacement: 90  Fetal Station: -1  Baseline Rate: 135 bpm     FHR Category: Category I             AROM for copious amount of clear fluid  Patient comfortable with epidural  Vital Signs:   Vitals:    08/19/20 1532   BP: 124/65   Pulse: 85   Resp:    Temp:            Notes/comments:        Davion Cedeno MD 8/19/2020 6:19 PM

## 2020-08-19 NOTE — DISCHARGE SUMMARY
Discharge Summary - Papi Sterling 30 y o  female MRN: [id number]    Unit/Bed#: L&D 325-01 Encounter: [id number]    Admission Date: 2020     Discharge Date: 2020    Admitting Diagnosis:   Patient Active Problem List   Diagnosis    Current duncan pregnancy with history of congenital heart disease in prior child, antepartum    Third trimester pregnancy    39 weeks gestation of pregnancy    Obesity affecting pregnancy    Pap smear abnormality of cervix with LGSIL     (spontaneous vaginal delivery)       Discharge Diagnosis:   Same, delivered  Gestational hypertension    Procedures:   spontaneous vaginal delivery    Admitting Attending: Dr Lizzie Mota MD  Delivery Attending: Dr Mike Bruno  Discharge Attending: Dr Staci Rodriguez Course:     Papi Sterling [facility] 30 [facility] for elective induction of labor  She received pitocin for induction and met criteria for gestational hypertension during labor  She received an epidural for analgesia and amniotomy for augmentation and progressed well  She then underwent an uncomplicated spontaneous vaginal delivery and delivered a viable male  at 1 on 20  APGARS were 9, 9 at 1 and 5 minutes, respectively   weighed 7lb 1 1oz   was then transferred to  nursery  Patient tolerated the procedure well and was transferred to postpartum in stable condition  The patient's post partum course was unremarkable  On day of discharge, she was ambulating and able to reasonably perform all ADLs  She was voiding and had appropriate bowel function  Pain was well controlled  She was discharged home on postpartum day #2 without complications  Patient was instructed to follow up with her OB as an outpatient and was given appropriate warnings to call provider if she develops signs of infection or uncontrolled pain      Condition at discharge:   good     Disposition:   Home    Planned Readmission:   No    Discharge Medications:   Please see after visit summary for full list of discharge medications  Discharge instructions :   -Do not place anything (no partner, tampons or douche) in your vagina for 6 weeks  -You may walk for exercise for the first 6 weeks then gradually return to your usual activities    -Please do not drive for 1 week if you have no stitches and for 2 weeks if you have stitches or underwent a  delivery     -You may take baths or shower per your preference    -Please look at your bust (breasts) in the mirror daily and call provider for redness or tenderness or increased warmth  - If you have had a  please look at your incision daily as well and call provider for increasing redness or steady drainage from the incision    -Please call your provider if temperature > 100 4*F or 38* C, worsening pain or a foul discharge      Becky Evans MD  PGY-1 OB/GYN   2020 6:53 AM

## 2020-08-19 NOTE — OB LABOR/OXYTOCIN SAFETY PROGRESS
Oxytocin Safety Progress Check Note - Brandi Burn 30 y o  female MRN: [id number]    Unit/Bed#: L&D 325-01 Encounter: [id number]    Dose (satish-units/min) Oxytocin: 8 satish-units/min  Contraction Frequency (minutes): 3-5  Contraction Quality: Mild  Tachysystole: No   Cervical Dilation: 3        Cervical Effacement: 80  Fetal Station: -2  Baseline Rate: 135 bpm                     Vital Signs:  Vitals:    08/19/20 0625   BP: 122/82   Pulse: (!) 109   Resp: 18   Temp: 98 3 °F (36 8 °C)           Notes/comments:   - Feeling rare cramping  - Continue to titrate pitocin 2x2 per protocol, recently increased to 10 mu/min, will defer check/AROM at this time  - FHT Category I, on MD Liliana Olmstead MD 8/19/2020 11:08 AM

## 2020-08-19 NOTE — L&D DELIVERY NOTE
Delivery Summary - OB/GYN   Marky Rock 30 y o  female MRN: [id number]  Unit/Bed#: L&D 325-01 Encounter: [id number]    Pre-delivery Diagnosis:   1  39w1d pregnancy  2  Elective IOL  3  Maternal obesity    Post-delivery Diagnosis: same, delivered    Attending: Nura      Assistant(s): Leelee So MS    Procedure: , repair of second degree laceration    Anesthesia: epidural    Estimated Blood Loss:  See QBL    Specimens:   1  Arterial and venous cord gases  2  Cord blood  3  Segment of umbilical cord  4  Placenta to storage     Complications:  None apparent    Findings:  1  Viable male  delivered at [place] [address] weight pending  Apgar scores of 9 at one minute and 9 at five minutes  2  Spontaneous delivery of placenta with centrally inserted 3-vessel cord at 1922  3  Clear amniotic fluid  4  2nd degree perineal laceration, repaired with 3-0Vicryl rapid  5  Umbilical Cord Gases     pH Base Excess   Arterial Results from last 7 days   Lab Units 20   PH COA  7 265     Results from last 7 days   Lab Units 20   BASE EXC COA mmol/L -4 0*      Venous Results from last 7 days   Lab Units 20   [address] COV  7 329     Results from last 7 days   Lab Units 20   BASE EXC COV mmol/L -1 4*             Disposition: Patient tolerated the procedure well and was recovering in labor and delivery room with family and  before being transferred to the post-partum floor  Procedure Details     Description of procedure    After pushing for 7 minutes, at [place] [address] patient delivered a viable male , weight pending, Apgars of 9 (1 min) and 9 (5 min)  The fetal vertex delivered spontaneously  There was a tight nuchal cord which was clamped and cut on the perineium  The anterior shoulder delivered atraumatically with maternal expulsive forces and the assistance of downward traction   The posterior shoulder delivered with maternal expulsive forces and the assistance of upward traction  The remainder of the fetus delivered spontaneously  Upon delivery, the infant was placed on the mothers abdomen and the cord was clamped and cut  The infant was noted to cry spontaneously and was moving all extremities appropriately  There was no evidence for injury  Awaiting nurse resuscitators evaluated the  at bedside  Arterial and venous cord blood gases and cord blood was collected for analysis  These were promptly sent to the lab  In the immediate post-partum, 30 units of IV pitocin was administered and the uterus was noted to contract down well with massage and pitocin  The placenta delivered spontaneously at [facility] and was noted to have a centrally inserted 3 vessel cord  The vagina, cervix, and perineum were inspected and there was noted to be a second degree laceration  Laceration Repair  Patient was comfortable with epidural at that time  A second degree was identified and required repair  Laceration was repaired with 3-0 Vicryl rapid in usual fashion to reapproximate the laceration  Good hemostasis was confirmed at the conclusion of this procedure  At the conclusion of the delivery, all needle, sponge, and instrument counts were noted to be correct  Patient tolerated the procedure well and was allowed to recover in labor and delivery room with family and  before being transferred to the post-partum floor  I was present and participated in all key portions of the case

## 2020-08-20 PROCEDURE — 99024 POSTOP FOLLOW-UP VISIT: CPT | Performed by: OBSTETRICS & GYNECOLOGY

## 2020-08-20 RX ADMIN — DOCUSATE SODIUM 100 MG: 100 CAPSULE, LIQUID FILLED ORAL at 18:34

## 2020-08-20 RX ADMIN — IBUPROFEN 600 MG: 600 TABLET ORAL at 16:34

## 2020-08-20 RX ADMIN — IBUPROFEN 600 MG: 600 TABLET ORAL at 03:33

## 2020-08-20 RX ADMIN — IBUPROFEN 600 MG: 600 TABLET ORAL at 10:05

## 2020-08-20 RX ADMIN — ACETAMINOPHEN 650 MG: 325 TABLET ORAL at 06:09

## 2020-08-20 RX ADMIN — ACETAMINOPHEN 650 MG: 325 TABLET ORAL at 13:11

## 2020-08-20 RX ADMIN — IBUPROFEN 600 MG: 600 TABLET ORAL at 22:55

## 2020-08-20 RX ADMIN — ACETAMINOPHEN 650 MG: 325 TABLET ORAL at 19:21

## 2020-08-20 RX ADMIN — DOCUSATE SODIUM 100 MG: 100 CAPSULE, LIQUID FILLED ORAL at 08:40

## 2020-08-20 NOTE — LACTATION NOTE
This note was copied from a baby's chart  Met with mother  Provided mother with Ready, Set, Baby booklet  Discussed Skin to Skin contact an benefits to mom and baby  Talked about the delay of the first bath until baby has adjusted  Spoke about the benefits of rooming in  Feeding on cue and what that means for recognizing infant's hunger  Avoidance of pacifiers for the first month discussed  Talked about exclusive breastfeeding for the first 6 months  Positioning and latch reviewed as well as showing images of other feeding positions  Discussed the properties of a good latch in any position  Reviewed hand/manual expression  Discussed s/s that baby is getting enough milk and some s/s that breastfeeding dyad may need further help  Gave information on common concerns, what to expect the first few weeks after delivery, preparing for other caregivers, and how partners can help  Resources for support also provided  Mother verbalized breastfeeding is going well  Baby has been sleepy since the circ  I demo  ways to awaken him, he woke up briefly  I demo  football hold, how to hand express and how to get a deep latch  Baby took a few sucks and fell back to sleep  Mom hand expressing colostrum as we were attempting to latch  Reassured mom the sleepiness is normal  Enc to call for assistance as needed,phone # given

## 2020-08-20 NOTE — ANESTHESIA POSTPROCEDURE EVALUATION
Post-Op Assessment Note    CV Status:  Stable    Pain management: adequate     Mental Status:  Alert and awake   Hydration Status:  Euvolemic   PONV Controlled:  Controlled   Airway Patency:  Patent      Post Op Vitals Reviewed: Yes      Staff: Anesthesiologist     Post-op block assessment: catheter intact and adhesive bandage applied      No complications documented      BP      Temp      Pulse     Resp      SpO2

## 2020-08-20 NOTE — PROGRESS NOTES
Progress Note - OB/GYN   Christopher Baker 30 y o  female MRN: [id number]  Unit/Bed#: L&D 316-01 Encounter: [id number]    Assessment:  30 y o  [id number] s/p Spontaneous Vaginal Delivery Postpartum day  1    Plan:  1  Routine post-partum care  2  Encourage ambulation  3  Pain control as needed  4  Advance diet as tolerated  5  Rhogam not indicated  6  GHTN: -120 / 60s since delivery  7  Anticipate discharge 8/21/20    Subjective/Objective   Chief Complaint:       Subjective:  Christopher Baker is well appearing and has no complaints at this time  She denies any dizziness, nausea, vomiting, chest pain, shortness of breath, palpitations, or headaches  Pain: Well controlled with pain medication regimen  Tolerating PO: yes  Voiding: yes  Flatus: yes  BM: no  Ambulating: yes  Breastfeeding: yes  Chest pain: no  Shortness of breath: no  Leg pain: no  Lochia: Decreasing    Objective:     Vitals: Blood pressure 109/62, pulse 87, temperature 98 6 °F (37 °C), temperature source Oral, resp  rate 16, height 5' 4" (1 626 m), weight 93 kg (205 lb), last menstrual period 11/19/2019, SpO2 98 %, currently breastfeeding      Intake/Output Summary (Last 24 hours) at 8/20/2020 0649  Last data filed at 8/20/2020 0300  Gross per 24 hour   Intake 1000 ml   Output 1035 ml   Net -35 ml       Physical Exam:     General: NAD  Cardiovascular: RRR, no murmur, nl S1/S2   Lungs: CTAB, non-labored breathing   Abdomen: Soft, no distension/rebound/guarding/tenderness   Fundus: Firm, non-tender, fundus: -2 cm below the umbilicus   Lower Extremities: Non-tender      Lab, Imaging and other studies:     Recent Results (from the past 72 hour(s))   Type and screen    Collection Time: 08/19/20  6:59 AM   Result Value Ref Range    ABO Grouping O     Rh Factor Positive     Antibody Screen Negative     Specimen Expiration Date [id number]    CBC    Collection Time: 08/19/20  6:59 AM   Result Value Ref Range    WBC 9 70 4 31 - 10 16 Thousand/uL    RBC 3 67 (L) 3 81 - 5 12 Million/uL    Hemoglobin 11 0 (L) 11 5 - 15 4 g/dL    Hematocrit 33 3 (L) 34 8 - 46 1 %    MCV 91 82 - 98 fL    MCH 30 0 26 8 - 34 3 pg    MCHC 33 0 31 4 - 37 4 g/dL    RDW 13 2 11 6 - 15 1 %    Platelets 96 (L) 834 - 390 Thousands/uL    MPV 9 8 8 9 - 12 7 fL   RPR    Collection Time: 08/19/20  6:59 AM   Result Value Ref Range    RPR Non-Reactive Non-Reactive   CBC and Platelet    Collection Time: 08/19/20  3:55 PM   Result Value Ref Range    WBC 12 84 (H) 4 31 - 10 16 Thousand/uL    RBC 3 78 (L) 3 81 - 5 12 Million/uL    Hemoglobin 11 3 (L) 11 5 - 15 4 g/dL    Hematocrit 34 9 34 8 - 46 1 %    MCV 92 82 - 98 fL    MCH 29 9 26 8 - 34 3 pg    MCHC 32 4 31 4 - 37 4 g/dL    RDW 13 2 11 6 - 15 1 %    Platelets 608 (L) 513 - 390 Thousands/uL    MPV 10 0 8 9 - 12 7 fL   Comprehensive metabolic panel    Collection Time: 08/19/20  4:06 PM   Result Value Ref Range    Sodium 141 136 - 145 mmol/L    Potassium 3 7 3 5 - 5 3 mmol/L    Chloride 104 100 - 108 mmol/L    CO2 22 21 - 32 mmol/L    ANION GAP 15 (H) 4 - 13 mmol/L    BUN 7 5 - 25 mg/dL    Creatinine 0 68 0 60 - 1 30 mg/dL    Glucose 79 65 - 140 mg/dL    Calcium 8 9 8 3 - 10 1 mg/dL    AST 12 5 - 45 U/L    ALT 15 12 - 78 U/L    Alkaline Phosphatase 127 (H) 46 - 116 U/L    Total Protein 6 7 6 4 - 8 2 g/dL    Albumin 2 8 (L) 3 5 - 5 0 g/dL    Total Bilirubin 0 40 0 20 - 1 00 mg/dL    eGFR 121 ml/min/1 73sq m   Protein / creatinine ratio, urine    Collection Time: 08/19/20  4:06 PM   Result Value Ref Range    Creatinine, Ur 68 8 mg/dL    Protein Urine Random 14 mg/dL    Prot/Creat Ratio, Ur 0 20 (H) 0 00 - 0 10   Blood gas, arterial, cord    Collection Time: 08/19/20  7:21 PM   Result Value Ref Range    pH, Cord Art 7 265 7 230 - 7 430    pCO2, Cord Art 54 0 30 0 - 60 0    pO2, Cord Art 10 4 5 0 - 25 0 mm HG    HCO3, Cord Art 24 0 17 3 - 27 3 mmol/L    Base Exc, Cord Art -4 0 (L) 3 0 - 11 0 mmol/L    O2 Content, Cord Art 3 4 ml/dl    O2 Hgb, Arterial Cord 13 5 %   Blood gas, venous, cord    Collection Time: 08/19/20  7:21 PM   Result Value Ref Range    pH, Cord Luciano 7 329 7 190 - 7 490    pCO2, Cord Luciano 49 2 (H) 27 0 - 43 0 mm HG    pO2, Cord Luciano 19 0 15 0 - 45 0 mm HG    HCO3, Cord Luciano 25 3 12 2 - 28 6 mmol/L    Base Exc, Cord Luciano -1 4 (L) 1 0 - 9 0 mmol/L    O2 Cont, Cord Luciano 10 3 mL/dL    O2 HGB,VENOUS CORD 40 3 %     Meds:  docusate sodium, 100 mg, Oral, BID      acetaminophen, 650 mg, Q6H PRN  benzocaine-menthol-lanolin-aloe, , 4x Daily PRN  bisacodyl, 10 mg, Daily PRN  calcium carbonate, 1,000 mg, TID PRN  diphenhydrAMINE, 25 mg, Q6H PRN  hydrocortisone, 1 application, 4x Daily PRN  ibuprofen, 600 mg, Q6H PRN  ondansetron, 4 mg, Q6H PRN  simethicone, 80 mg, Q6H PRN  witch hazel-glycerin, 1 pad, Q2H PRN              Signature / Title: Cornelius Aase, MD, Ob/Gyn, PGY-1  Date: 8/20/2020  Time: 6:49 AM

## 2020-08-21 VITALS
WEIGHT: 205 LBS | TEMPERATURE: 98.4 F | SYSTOLIC BLOOD PRESSURE: 102 MMHG | OXYGEN SATURATION: 98 % | HEIGHT: 64 IN | BODY MASS INDEX: 35 KG/M2 | HEART RATE: 81 BPM | DIASTOLIC BLOOD PRESSURE: 70 MMHG | RESPIRATION RATE: 16 BRPM

## 2020-08-21 PROCEDURE — 99024 POSTOP FOLLOW-UP VISIT: CPT | Performed by: OBSTETRICS & GYNECOLOGY

## 2020-08-21 RX ORDER — IBUPROFEN 600 MG/1
600 TABLET ORAL EVERY 6 HOURS PRN
Qty: 30 TABLET | Refills: 0 | Status: SHIPPED | OUTPATIENT
Start: 2020-08-21

## 2020-08-21 RX ADMIN — IBUPROFEN 600 MG: 600 TABLET ORAL at 13:34

## 2020-08-21 RX ADMIN — DOCUSATE SODIUM 100 MG: 100 CAPSULE, LIQUID FILLED ORAL at 12:27

## 2020-08-21 RX ADMIN — IBUPROFEN 600 MG: 600 TABLET ORAL at 07:40

## 2020-08-21 RX ADMIN — ACETAMINOPHEN 650 MG: 325 TABLET ORAL at 12:29

## 2020-08-21 NOTE — PROGRESS NOTES
Discharge teaching and education packet was given to patient and FOB  Appropriate questions were asked and answered  Pt validated understanding of teaching

## 2020-08-21 NOTE — PROGRESS NOTES
Progress Note - OB/GYN   Christopher Baker 30 y o  female MRN: [id number]  Unit/Bed#: L&D 316-01 Encounter: [id number]      Assessment:  Post partum day #2 s/p , stable, and doing well]  She reports that her bleeding is improved today and wishes to go home    Plan:    [place]   - -127/63-74 over the last 24h   - Follow up closely outpatient for BP monitoring    Continue routine post partum care   - Patient is ambulating, encouraged to continue    - Encourage breastfeeding    Continue current meds    - See list below   - Pain well controlled    Future contraception   - Pt undecided     Disposition   - Anticipate discharge home today      Subjective/Objective       Subjective:   Pain: yes,w ell controlled  Tolerating Oral Intake: yes  Voiding: yes  Flatus: yes  Bowel Movement: no  Ambulating: yes  Breastfeeding: Breastfeeding  Chest Pain: no  Shortness of Breath: no  Leg Pain/Discomfort: no  Lochia: moderate    Objective:   Vitals:   /74 (BP Location: Left arm)   Pulse 69   Temp 98 7 °F (37 1 °C) (Oral)   Resp 20   Ht 5' 4" (1 626 m)   Wt 93 kg (205 lb)   LMP 2019   SpO2 98%   Breastfeeding Yes   BMI 35 19 kg/m²   Body mass index is 35 19 kg/m²    I/O        07 -  0700  07 -  0700    I V  (mL/kg) 1000 (10 8)     Total Intake(mL/kg) 1000 (10 8)     Urine (mL/kg/hr) 950 (0 4)     Blood 85     Total Output 1035     Net -35               Lab Results   Component Value Date    WBC 12 84 (H) 2020    HGB 11 3 (L) 2020    HCT 34 9 2020    MCV 92 2020     (L) 2020       Meds/Allergies   Current Facility-Administered Medications   Medication Dose Route Frequency    acetaminophen (TYLENOL) tablet 650 mg  650 mg Oral Q6H PRN    benzocaine-menthol-lanolin-aloe (DERMOPLAST) 20-0 5 % topical spray   Topical 4x Daily PRN    bisacodyl (DULCOLAX) rectal suppository 10 mg  10 mg Rectal Daily PRN    calcium carbonate (TUMS) chewable tablet 1,000 mg  1,000 mg Oral TID PRN    diphenhydrAMINE (BENADRYL) tablet 25 mg  25 mg Oral Q6H PRN    docusate sodium (COLACE) capsule 100 mg  100 mg Oral BID    hydrocortisone 1 % cream 1 application  1 application Topical 4x Daily PRN    ibuprofen (MOTRIN) tablet 600 mg  600 mg Oral Q6H PRN    ondansetron (ZOFRAN) injection 4 mg  4 mg Intravenous Q6H PRN    simethicone (MYLICON) chewable tablet 80 mg  80 mg Oral Q6H PRN    witch hazel-glycerin (TUCKS) topical pad 1 pad  1 pad Topical Q2H PRN       Physical Exam:  General: in no apparent distress  Cardiovascular: Cor RRR  Lungs: clear to auscultation bilaterally  Fundus: Firm, 1 cm above the umbilicus    Noe Schmid MD  PGY-1 OB/GYN   8/21/2020 6:50 AM

## 2020-08-23 ENCOUNTER — TELEPHONE (OUTPATIENT)
Dept: OTHER | Facility: OTHER | Age: 25
End: 2020-08-23

## 2020-08-23 NOTE — TELEPHONE ENCOUNTER
Patient is calling because she believes  she has mastitis  She has hard firm areas in her breasts along with the red streaks  Told patient to call back in 20-30 minutes if no call back from provider

## 2020-08-24 ENCOUNTER — DOCUMENTATION (OUTPATIENT)
Dept: OBGYN CLINIC | Facility: CLINIC | Age: 25
End: 2020-08-24

## 2020-08-24 NOTE — PROGRESS NOTES
Patient called on 8/23/20 with concern for mastitis  She had recent delivery and is breastfeeding  She notes bilateral tenderness in the upper outer quadrants of the breast with slight redness  She denies any severe pain, fevers, chills, sweats, nausea, vomiting, diarrhea or any other significant symptoms  Likely, she does not have mastitis but breast engorgement  Practical recommendations were reviewed including continued breastfeeding, pumping as needed, adequate fluid hydration, warm soaks, heating pad, ice packs as needed to the breast, etc  Should she develop any of the above-noted symptoms, would recommend she call back for further management and possible antibiotic therapy  Otherwise, recommend she follow up with her primary OBGYN on 8/24/20 for further management

## 2020-08-27 LAB — PLACENTA IN STORAGE: NORMAL

## 2020-09-30 ENCOUNTER — POSTPARTUM VISIT (OUTPATIENT)
Dept: OBGYN CLINIC | Facility: CLINIC | Age: 25
End: 2020-09-30

## 2020-09-30 VITALS
WEIGHT: 188.6 LBS | SYSTOLIC BLOOD PRESSURE: 122 MMHG | DIASTOLIC BLOOD PRESSURE: 68 MMHG | HEIGHT: 63 IN | BODY MASS INDEX: 33.42 KG/M2 | TEMPERATURE: 97.8 F

## 2020-09-30 DIAGNOSIS — Z30.011 ENCOUNTER FOR INITIAL PRESCRIPTION OF CONTRACEPTIVE PILLS: ICD-10-CM

## 2020-09-30 PROCEDURE — 99024 POSTOP FOLLOW-UP VISIT: CPT | Performed by: STUDENT IN AN ORGANIZED HEALTH CARE EDUCATION/TRAINING PROGRAM

## 2020-09-30 RX ORDER — LEVONORGESTREL AND ETHINYL ESTRADIOL 0.15-0.03
1 KIT ORAL DAILY
Qty: 84 TABLET | Refills: 3 | Status: SHIPPED | OUTPATIENT
Start: 2020-09-30 | End: 2021-06-11 | Stop reason: SDUPTHER

## 2021-06-11 DIAGNOSIS — Z30.011 ENCOUNTER FOR INITIAL PRESCRIPTION OF CONTRACEPTIVE PILLS: ICD-10-CM

## 2021-06-14 RX ORDER — LEVONORGESTREL AND ETHINYL ESTRADIOL 0.15-0.03
1 KIT ORAL DAILY
Qty: 84 TABLET | Refills: 0 | Status: SHIPPED | OUTPATIENT
Start: 2021-06-14 | End: 2021-10-04 | Stop reason: SDUPTHER

## 2021-10-04 DIAGNOSIS — Z30.011 ENCOUNTER FOR INITIAL PRESCRIPTION OF CONTRACEPTIVE PILLS: ICD-10-CM

## 2021-10-26 RX ORDER — LEVONORGESTREL AND ETHINYL ESTRADIOL 0.15-0.03
1 KIT ORAL DAILY
Qty: 84 TABLET | Refills: 0 | Status: SHIPPED | OUTPATIENT
Start: 2021-10-26 | End: 2022-01-27 | Stop reason: SDUPTHER

## 2022-01-27 DIAGNOSIS — Z30.011 ENCOUNTER FOR INITIAL PRESCRIPTION OF CONTRACEPTIVE PILLS: ICD-10-CM

## 2022-02-01 RX ORDER — LEVONORGESTREL AND ETHINYL ESTRADIOL 0.15-0.03
1 KIT ORAL DAILY
Qty: 84 TABLET | Refills: 0 | Status: SHIPPED | OUTPATIENT
Start: 2022-02-01 | End: 2022-04-26

## 2022-04-05 ENCOUNTER — OFFICE VISIT (OUTPATIENT)
Dept: URGENT CARE | Age: 27
End: 2022-04-05
Payer: COMMERCIAL

## 2022-04-05 VITALS
RESPIRATION RATE: 17 BRPM | HEART RATE: 99 BPM | HEIGHT: 63 IN | BODY MASS INDEX: 31.89 KG/M2 | TEMPERATURE: 98.6 F | WEIGHT: 180 LBS | OXYGEN SATURATION: 99 %

## 2022-04-05 DIAGNOSIS — R09.81 SINUS CONGESTION: Primary | ICD-10-CM

## 2022-04-05 PROCEDURE — G0382 LEV 3 HOSP TYPE B ED VISIT: HCPCS

## 2022-04-05 PROCEDURE — U0005 INFEC AGEN DETEC AMPLI PROBE: HCPCS

## 2022-04-05 PROCEDURE — U0003 INFECTIOUS AGENT DETECTION BY NUCLEIC ACID (DNA OR RNA); SEVERE ACUTE RESPIRATORY SYNDROME CORONAVIRUS 2 (SARS-COV-2) (CORONAVIRUS DISEASE [COVID-19]), AMPLIFIED PROBE TECHNIQUE, MAKING USE OF HIGH THROUGHPUT TECHNOLOGIES AS DESCRIBED BY CMS-2020-01-R: HCPCS

## 2022-04-05 RX ORDER — OXYMETAZOLINE HYDROCHLORIDE 0.05 G/100ML
2 SPRAY NASAL 2 TIMES DAILY
Qty: 30 ML | Refills: 0 | Status: SHIPPED | OUTPATIENT
Start: 2022-04-05

## 2022-04-05 RX ORDER — CETIRIZINE HYDROCHLORIDE 10 MG/1
10 TABLET ORAL DAILY
COMMUNITY

## 2022-04-05 RX ORDER — BROMPHENIRAMINE MALEATE, PSEUDOEPHEDRINE HYDROCHLORIDE, AND DEXTROMETHORPHAN HYDROBROMIDE 2; 30; 10 MG/5ML; MG/5ML; MG/5ML
5 SYRUP ORAL 4 TIMES DAILY PRN
Qty: 120 ML | Refills: 0 | Status: SHIPPED | OUTPATIENT
Start: 2022-04-05

## 2022-04-05 NOTE — PATIENT INSTRUCTIONS
Sinusitis     Bromfed and Afrin as directed  Continue Zyrtec daily  Educated on Afrin use and possibility of rebound congestion  Follow up with PCP in 3-5 days  COVID testing collected today  Results in 24-48 hours  Proceed to ER if symptoms worsen  AMBULATORY CARE:   Sinusitis  is inflammation or infection of your sinuses  Sinusitis is most often caused by a virus  Acute sinusitis may last up to 12 weeks  Chronic sinusitis lasts longer than 12 weeks  Recurrent sinusitis means you have 4 or more infections in 1 year  Common signs and symptoms:   · Fever    · Pain, pressure, redness, or swelling around the forehead, cheeks, or eyes    · Thick yellow or green discharge from your nose    · Tenderness when you touch your face over your sinuses    · Dry cough that happens mostly at night or when you lie down    · Headache and face pain that is worse when you lean forward    · Tooth pain, or pain when you chew    Seek care immediately if:   · You have trouble breathing or wheezing that is getting worse  · You have a stiff neck, a fever, or a bad headache  · You cannot open your eye  · Your eyeball bulges out or you cannot move your eye  · You are more sleepy than normal, or you notice changes in your ability to think, move, or talk  · You have swelling of your forehead or scalp  Call your doctor if:   · You have vision changes, such as double vision  · Your eye and eyelid are red, swollen, and painful  · Your symptoms do not improve or go away after 10 days  · You have nausea and are vomiting  · Your nose is bleeding  · You have questions or concerns about your condition or care  Medicines: Your symptoms may go away on their own  Your healthcare provider may recommend watchful waiting for up to 10 days before starting antibiotics  You may need any of the following:  · Acetaminophen  decreases pain and fever  It is available without a doctor's order   Ask how much to take and how often to take it  Follow directions  Read the labels of all other medicines you are using to see if they also contain acetaminophen, or ask your doctor or pharmacist  Acetaminophen can cause liver damage if not taken correctly  Do not use more than 4 grams (4,000 milligrams) total of acetaminophen in one day  · NSAIDs , such as ibuprofen, help decrease swelling, pain, and fever  This medicine is available with or without a doctor's order  NSAIDs can cause stomach bleeding or kidney problems in certain people  If you take blood thinner medicine, always ask your healthcare provider if NSAIDs are safe for you  Always read the medicine label and follow directions  · Nasal steroid sprays  may help decrease inflammation in your nose and sinuses  · Decongestants  help reduce swelling and drain mucus in the nose and sinuses  They may help you breathe easier  · Antihistamines  help dry mucus in the nose and relieve sneezing  · Antibiotics  help treat or prevent a bacterial infection  Self-care:   · Rinse your sinuses as directed  Use a sinus rinse device to rinse your nasal passages with a saline (salt water) solution or distilled water  Do not use tap water  This will help thin the mucus in your nose and rinse away pollen and dirt  It will also help reduce swelling so you can breathe normally  · Use a humidifier  to increase air moisture in your home  This may make it easier for you to breathe and help decrease your cough  · Sleep with your head elevated  Place an extra pillow under your head before you go to sleep to help your sinuses drain  · Drink liquids as directed  Ask your healthcare provider how much liquid to drink each day and which liquids are best for you  Liquids will thin the mucus in your nose and help it drain  Avoid drinks that contain alcohol or caffeine  · Do not smoke, and avoid secondhand smoke    Nicotine and other chemicals in cigarettes and cigars can make your symptoms worse  Ask your healthcare provider for information if you currently smoke and need help to quit  E-cigarettes or smokeless tobacco still contain nicotine  Talk to your healthcare provider before you use these products  Prevent the spread of germs:   · Wash your hands often with soap and water  Wash your hands after you use the bathroom, change a child's diaper, or sneeze  Wash your hands before you prepare or eat food  · Stay away from people who are sick  Some germs spread easily and quickly through contact  Follow up with your doctor as directed: You may be referred to an ear, nose, and throat specialist  Write down your questions so you remember to ask them during your visits  © Copyright [employer] 2022 Information is for End User's use only and may not be sold, redistributed or otherwise used for commercial purposes  All illustrations and images included in CareNotes® are the copyrighted property of A D A M , Inc  or [facility] Anand Guerrero   The above information is an  only  It is not intended as medical advice for individual conditions or treatments  Talk to your doctor, nurse or pharmacist before following any medical regimen to see if it is safe and effective for you

## 2022-04-05 NOTE — PROGRESS NOTES
[facility]        NAME: Aung Bhardwaj is a 27 y o  female  : 1995    MRN: [id number]  DATE: 2022  TIME: 2:43 PM    Assessment and Plan   Sinus congestion [R09 81]  1  Sinus congestion  COVID Only -Office Collect    brompheniramine-pseudoephedrine-DM 30-2-10 MG/5ML syrup    oxymetazoline (AFRIN) 0 05 % nasal spray         Patient Instructions     Continue Zyrtec daily  Bromfed as needed  Education provided on Afrin use  COVID testing collected today  Follow up with PCP in 3-5 days  Proceed to ER if symptoms worsen  Chief Complaint     Chief Complaint   Patient presents with    Cold Like Symptoms     Pt c/o dry cough, sinus congestion since Sun, +Vacc  tried Zyrtec         History of Present Illness     27 y o  F presents with complaints of cough and sinus congestion x 3 days  Denies CP, SOB, N/V/D, fevers, chills, body aches, sore throat  Denies sick contacts  Vaccinated against Matthshilpiport  No hx of COVID infection  Denies recent travel  No tobacco use  Takes Zyrtec year-round for allergic rhinitis  Review of Systems   Review of Systems   Constitutional: Negative for chills, fatigue and fever  HENT: Positive for congestion and sinus pressure  Negative for ear pain, facial swelling, hearing loss, rhinorrhea, sneezing, sore throat and trouble swallowing  Eyes: Negative for pain, redness and visual disturbance  Respiratory: Positive for cough  Negative for chest tightness, shortness of breath and wheezing  Cardiovascular: Negative for chest pain and palpitations  Gastrointestinal: Negative for abdominal pain, diarrhea, nausea and vomiting  Genitourinary: Negative for dysuria, flank pain, hematuria and pelvic pain  Musculoskeletal: Negative for arthralgias, back pain and myalgias  Skin: Negative for color change and rash  Neurological: Negative for dizziness, seizures, syncope, weakness, light-headedness and headaches     Psychiatric/Behavioral: Negative for confusion, hallucinations and sleep disturbance  The patient is not nervous/anxious  All other systems reviewed and are negative          Current Medications       Current Outpatient Medications:     cetirizine (ZyrTEC) 10 mg tablet, Take 10 mg by mouth daily, Disp: , Rfl:     levonorgestrel-ethinyl estradiol (SEASONALE) 0 15-0 03 MG per tablet, Take 1 tablet by mouth daily, Disp: 84 tablet, Rfl: 0    acetaminophen (TYLENOL) 325 mg tablet, Take 2 tablets (650 mg total) by mouth every 6 (six) hours as needed for headaches (Patient not taking: Reported on 4/5/2022 ), Disp: 30 tablet, Rfl: 0    brompheniramine-pseudoephedrine-DM 30-2-10 MG/5ML syrup, Take 5 mL by mouth 4 (four) times a day as needed for congestion or cough, Disp: 120 mL, Rfl: 0    ibuprofen (MOTRIN) 600 mg tablet, Take 1 tablet (600 mg total) by mouth every 6 (six) hours as needed for moderate pain (cramping) (Patient not taking: Reported on 4/5/2022 ), Disp: 30 tablet, Rfl: 0    oxymetazoline (AFRIN) 0 05 % nasal spray, 2 sprays by Each Nare route 2 (two) times a day, Disp: 30 mL, Rfl: 0    Prenatal Vit-Fe Fumarate-FA (PRENATAL 1+1 PO), Take 1 tablet by mouth (Patient not taking: Reported on 4/5/2022 ), Disp: , Rfl:     Current Allergies     Allergies as of 04/05/2022 - Reviewed 04/05/2022   Allergen Reaction Noted    Pollen extract      Seasonal ic  [cholestatin]  11/06/2017            The following portions of the patient's history were reviewed and updated as appropriate: allergies, current medications, past family history, past medical history, past social history, past surgical history and problem list      Past Medical History:   Diagnosis Date    Gestational hypertension, third trimester 8/19/2020    HPV (human papilloma virus) infection     Pap smear abnormality of cervix with LGSIL     Seasonal allergies     Varicella     vaccination       Past Surgical History:   Procedure Laterality Date    EAR SURGERY Right     eustachian tube     TYMPANOPLASTY      WISDOM TOOTH EXTRACTION         Family History   Problem Relation Age of Onset    Lung cancer Father     Diabetes Paternal Grandfather     No Known Problems Mother     No Known Problems Sister     No Known Problems Brother     No Known Problems Maternal Grandmother     Lung cancer Maternal Grandfather     No Known Problems Half-Sister     No Known Problems Half-Brother          Medications have been verified  Objective   Pulse 99   Temp 98 6 °F (37 °C)   Resp 17   Ht 5' 3" (1 6 m)   Wt 81 6 kg (180 lb)   LMP 03/05/2022   SpO2 99%   BMI 31 89 kg/m²   Patient's last menstrual period was 03/05/2022  Physical Exam     Physical Exam  Vitals reviewed  Constitutional:       General: She is not in acute distress  Appearance: Normal appearance  She is not toxic-appearing  HENT:      Head: Normocephalic  Right Ear: Tympanic membrane normal  Tympanic membrane is not erythematous or bulging  Left Ear: Tympanic membrane normal  Tympanic membrane is not erythematous or bulging  Nose: Congestion present  No rhinorrhea  Right Turbinates: Swollen  Left Turbinates: Swollen  Right Sinus: No maxillary sinus tenderness or frontal sinus tenderness  Left Sinus: No maxillary sinus tenderness or frontal sinus tenderness  Mouth/Throat:      Pharynx: Oropharynx is clear  Uvula midline  No oropharyngeal exudate, posterior oropharyngeal erythema or uvula swelling  Tonsils: No tonsillar exudate or tonsillar abscesses  Eyes:      Extraocular Movements: Extraocular movements intact  Conjunctiva/sclera: Conjunctivae normal       Pupils: Pupils are equal, round, and reactive to light  Cardiovascular:      Rate and Rhythm: Normal rate and regular rhythm  Pulses: Normal pulses  Heart sounds: Normal heart sounds  Pulmonary:      Effort: Pulmonary effort is normal  No tachypnea or respiratory distress        Breath sounds: Normal breath sounds and air entry  No decreased breath sounds, wheezing, rhonchi or rales  Abdominal:      General: Bowel sounds are normal       Palpations: Abdomen is soft  Tenderness: There is no abdominal tenderness  There is no right CVA tenderness or guarding  Musculoskeletal:         General: Normal range of motion  Cervical back: Normal range of motion  Lymphadenopathy:      Cervical: No cervical adenopathy  Skin:     General: Skin is warm and dry  Neurological:      General: No focal deficit present  Mental Status: She is alert  Cranial Nerves: Cranial nerves are intact  Sensory: Sensation is intact  Motor: Motor function is intact  Coordination: Coordination is intact  Gait: Gait is intact  Deep Tendon Reflexes: Reflexes are normal and symmetric

## 2022-04-06 LAB — SARS-COV-2 RNA RESP QL NAA+PROBE: NEGATIVE

## 2022-08-21 NOTE — DISCHARGE INSTRUCTIONS
Vaginal Delivery   WHAT YOU SHOULD KNOW:   A vaginal delivery is the birth of your baby through your vagina (birth canal)  AFTER YOU LEAVE:   Medicines:  · NSAIDs  help decrease swelling and pain or fever  This medicine is available with or without a doctor's order  NSAIDs can cause stomach bleeding or kidney problems in certain people  If you take blood thinner medicine, always ask your healthcare provider if NSAIDs are safe for you  Always read the medicine label and follow directions  · Take your medicine as directed  Call your healthcare provider if you think your medicine is not helping or if you have side effects  Tell him if you are allergic to any medicine  Keep a list of the medicines, vitamins, and herbs you take  Include the amounts, and when and why you take them  Bring the list or the pill bottles to follow-up visits  Carry your medicine list with you in case of an emergency  Follow up with your primary healthcare provider:  Most women need to return 6 weeks after a vaginal delivery  Ask about how to care for your wounds or stitches  Write down your questions so you remember to ask them during your visits  Activity:  Rest as much as possible  Try to keep all activities short  You may be able to do some exercise soon after you have your baby  Talk with your primary healthcare provider before you start exercising  If you work outside the home, ask when you can return to your job  Kegel exercises:  Kegel exercises may help your vaginal and rectal muscles heal faster  You can do Kegel exercises by tightening and relaxing the muscles around your vagina  Kegel exercises help make the muscles stronger  Breast care:  When your milk comes in, your breasts may feel full and hard  Ask how to care for your breasts, even if you are not breastfeeding  Constipation:  Do not try to push the bowel movement out if it is too hard   High-fiber foods, extra liquids, and regular exercise can help you prevent constipation  Examples of high-fiber foods are fruit and bran  Prune juice and water are good liquids to drink  Regular exercise helps your digestive system work  You may also be told to take over-the-counter fiber and stool softener medicines  Take these items as directed  Hemorrhoids:  Pregnancy can cause severe hemorrhoids  You may have rectal pain because of the hemorrhoids  Ask how to prevent or treat hemorrhoids  Perineum care: Your perineum is the area between your vagina and anus  Keep the area clean and dry to help it heal and to prevent infection  Wash the area gently with soap and water when you bathe or shower  Rinse your perineum with warm water when you use the toilet  Your primary healthcare provider may suggest you use a warm sitz bath to help decrease pain  A sitz bath is a bathtub or basin filled to hip level  Stay in the sitz bath for 20 to 30 minutes, or as directed  Vaginal discharge: You will have vaginal discharge, called lochia, after your delivery  The lochia is bright red the first day or two after the birth  By the fourth day, the amount decreases, and it turns red-brown  Use a sanitary pad rather than a tampon to prevent a vaginal infection  It is normal to have lochia up to 8 weeks after your baby is born  Monthly periods: Your period may start again within 7 to 12 weeks after your baby is born  If you are breastfeeding, it may take longer for your period to start again  You can still get pregnant again even though you do not have your monthly period  Talk with your primary healthcare provider about a birth control method that will be good for you if you do not want to get pregnant  Mood changes: Many new mothers have some kind of mood changes after delivery  Some of these changes occur because of lack of sleep, hormone changes, and caring for a new baby  Some mood changes can be more serious, such as postpartum depression   Talk with your primary healthcare provider if you feel unable to care for yourself or your baby  Sexual activity:  You may need to avoid sex for 6 to 7 weeks after you have your baby  You may notice you have a decreased desire for sex, or sex may be painful  You may need to use a vaginal lubricant (gel) to help make sex more comfortable  Contact your primary healthcare provider if:   · You have heavy vaginal bleeding that fills 1 or more sanitary pads in 1 hour  · You have a fever  · Your pain does not go away, or gets worse  · The skin between your vagina and rectum is swollen, warm, or red  · You have swollen, hard, or painful breasts  · You feel very sad or depressed  · You feel more tired than usual      · You have questions or concerns about your condition or care  Seek care immediately or call 911 if:   · You have pus or yellow drainage coming from your vagina or wound  · You are urinating very little, or not at all  · Your arm or leg feels warm, tender, and painful  It may look swollen and red  · You feel lightheaded, have sudden and worsening chest pain, or trouble breathing  You may have more pain when you take deep breaths or cough, or you may cough up blood  © 2014 [address] is for End User's use only and may not be sold, redistributed or otherwise used for commercial purposes  All illustrations and images included in CareNotes® are the copyrighted property of [employer]  or Sincere Kennedy  The above information is an  only  It is not intended as medical advice for individual conditions or treatments  Talk to your doctor, nurse or pharmacist before following any medical regimen to see if it is safe and effective for you  Fall Risk

## 2023-04-27 NOTE — RESULT NOTES
Verified Results  US OB LESS THAN 14 WKS WITH TRANSVAGINAL [id number] 04:47PM Carmel Martinez     Test Name Result Flag Reference   US OB LESS THAN 14 WEEKS WITH TRANSVAGINAL (Report)     FIRST TRIMESTER OBSTETRIC ULTRASOUND, COMPLETE     INDICATION: Dating ultrasound  LMP is 6/28/2017     COMPARISON: None  TECHNIQUE:  Transabdominal ultrasound of the pelvis was performed  Additional transvaginal imaging was then performed to better assess the gestation, myometrial/endometrial architecture and ovarian parenchymal detail  The study includes volumetric    sweeps and traditional still imaging technique  FINDINGS:     A single live intrauterine gestation is identified  Cardiac activity is present  Heart rate of 158 bpm      YOLK SAC: Present and normal in size and appearance  MEAN GESTATIONAL SAC SIZE: 23 5 mm = 7 weeks 0 days    MEAN CROWN RUMP LENGTH: 11 5 mm = 7 weeks 2 days    FETAL ANATOMY: Appropriate for gestational age  AMNIOTIC FLUID/SAC SHAPE: Within expected normal range  PLACENTA: The placenta is appropriate for gestational age  There is no significant subchorionic fluid collection  UTERUS/ADNEXA:    The uterus and ovaries are within normal limits  There is a corpus luteal cyst within the left ovary  The cervix remains closed  No free fluid present  IMPRESSION:     Single live intrauterine gestation at 7 weeks 0 days (range +/- 4 days)  JORDAN of 4/4/2018         Workstation performed: [id number]     Signed by:   Dafne Briceño MD   8/18/17 Topical Clindamycin Counseling: Patient counseled that this medication may cause skin irritation or allergic reactions.  In the event of skin irritation, the patient was advised to reduce the amount of the drug applied or use it less frequently.   The patient verbalized understanding of the proper use and possible adverse effects of clindamycin.  All of the patient's questions and concerns were addressed.

## 2023-08-04 ENCOUNTER — TELEPHONE (OUTPATIENT)
Dept: OBGYN CLINIC | Facility: MEDICAL CENTER | Age: 28
End: 2023-08-04

## 2023-08-04 ENCOUNTER — TELEPHONE (OUTPATIENT)
Dept: OBGYN CLINIC | Facility: CLINIC | Age: 28
End: 2023-08-04

## 2023-08-04 DIAGNOSIS — Z32.01 POSITIVE PREGNANCY TEST: Primary | ICD-10-CM

## 2023-08-16 ENCOUNTER — TELEPHONE (OUTPATIENT)
Dept: OBGYN CLINIC | Facility: MEDICAL CENTER | Age: 28
End: 2023-08-16

## 2023-08-16 ENCOUNTER — APPOINTMENT (OUTPATIENT)
Dept: LAB | Facility: HOSPITAL | Age: 28
End: 2023-08-16
Payer: COMMERCIAL

## 2023-08-16 DIAGNOSIS — Z32.01 POSITIVE PREGNANCY TEST: ICD-10-CM

## 2023-08-16 LAB — B-HCG SERPL-ACNC: ABNORMAL MIU/ML (ref 0–5)

## 2023-08-16 PROCEDURE — 84144 ASSAY OF PROGESTERONE: CPT

## 2023-08-16 PROCEDURE — 36415 COLL VENOUS BLD VENIPUNCTURE: CPT

## 2023-08-16 PROCEDURE — 84702 CHORIONIC GONADOTROPIN TEST: CPT

## 2023-08-16 NOTE — TELEPHONE ENCOUNTER
Returned patient's call. Patient states cramping have been much stronger since last night. No bleeding noted currently. Had some spotting 2 days ago. Discussed with on-call provider and patient to take tylenol to help with the pain and to get blood work done. Patient will get blood work done today. Patient advised that if the cramping becomes severe, she needs to go to the ED to be evaluated. Bleeding precautions reviewed. Patient agreeable to plan and will call if symptoms worsen.

## 2023-08-16 NOTE — TELEPHONE ENCOUNTER
ASSESSMENT/PLAN    Post-viral cough syndrome  Patient reassured that this is atypical course after a viral infection and the cough can take weeks to months to resolve  No signs of pneumonia reviewed with patient that antibiotics will not help  If cough does not improve could consider switching off benazepril    Type 2 diabetes mellitus without complication, without long-term current use of insulin (CMS/[facility])  -Today's a1c 8.6, goal is <7.0.  -Current regimen:  Maximum metformin, repaglinide, sitagliptin  -Adjustments today:  Add Jardiance  -declines diabetes education  -on benazepril  for renal protection  -on statin.   -continue baby aspirin up to age 80 in the absence of any bleeding events  - aspirin 81 MG chewable tablet; Chew 1 tablet by mouth daily.  Dispense: 90 tablet; Refill: 3  - empagliflozin (JARDIANCE) 10 MG tablet; Take 1 tablet by mouth daily (before breakfast).  Dispense: 90 tablet; Refill: 3  - metformin (GLUCOPHAGE) 1000 MG tablet; Take 1 tablet by mouth in the morning and 1 tablet in the evening. Take with meals.  Dispense: 90 tablet; Refill: 3  - repaglinide (PRANDIN) 1 MG tablet; Take 1 tablet by mouth in the morning and 1 tablet at noon and 1 tablet in the evening. Take before meals.  Dispense: 90 tablet; Refill: 3  - SITagliptan (Januvia) 100 MG tablet; Take 1 tablet by mouth daily.  Dispense: 90 tablet; Refill: 3  - POCT GLYCOHEMOGLOBIN ANALYZER    Primary hypertension  BP in goal, goal is under 130/80 due to diabetes  Continue amlodipine benazepril combination  - amlodipine-benazepril (LOTREL) 5-20 MG per capsule; Take 1 capsule by mouth daily. 5/20mg  Dispense: 90 capsule; Refill: 3    History of pulmonary embolism  Continue anticoagulation with Xarelto indefinitely  - rivaroxaban (Xarelto) 20 MG Tab; Take 1 tablet by mouth daily.  Dispense: 90 tablet; Refill: 3    Dyslipidemia associated with type 2 diabetes mellitus (CMS/HCC)  Tolerating rosuvastatin, continue  - rosuvastatin (CRESTOR) 20  Pt called in to say that she had a positive pregnancy test on 8/4/23,but pt now has started to have severe cramping that has gotten worse yesterday and today, she also has had spotting that started 2 days ago,stated that she does not see it all the time when she goes to the bathroom,it is sporadic,pt is asking for advise on what to do next,pt last seen 2020 MG tablet; Take 1 tablet by mouth nightly.  Dispense: 90 tablet; Refill: 3    Leg cramps  Patient request to try magnesium.  If no improvement can try switching to a different statin.  - magnesium 250 MG tablet; Take 1 tablet by mouth daily.  Dispense: 90 tablet; Refill: 3    Hypothyroidism, unspecified type  Currently asymptomatic on 25 mcg daily, unclear when last TSH was  - levothyroxine 25 MCG tablet; Take 1 tablet by mouth daily.  Dispense: 90 tablet; Refill: 3        Past medical, surgical, family, and social histories reviewed and updated in Epic.     Return in about 12 weeks (around 12/21/2022) for Diabetes Check Up, preventive health.    SUBJECTIVE    CC:   Chief Complaint   Patient presents with   • Office Visit     Urgent care follow up for Bronchitis       HPI:     Patient presents to [facility] care and follow-up on urgent care visit for purulent bronchitis for which he was given a course of antibiotics.      Completed antibiotics without side effects, but continues to have dry cough which is bothersome.  No shortness of breath, no chest pain, no fever.    Patient has type 2 diabetes.  When asked about nutritional strategies he says he eats whatever his wife gives him.  She says she spends a lot of time reading labels on trying to provide low glycemic index foods.  Patient does not appear to be counting carbs are paying attention to the quantity eats.    Bothered by leg cramps at night, wants to start magnesium    Requests refill of several medications    ROS: Negative except as documented in HPI.    OBJECTIVE    Vitals:    09/28/22 1732   BP: 138/70   Pulse: 71   Resp: 16   SpO2: 98%   Weight: 91.5 kg (201 lb 12.8 oz)   Height: 5' 10\" (1.778 m)       PHYSICAL EXAM    Gen: Alert, oriented, no acute distress  CV: RRR, no murmurs  Pulm: normal respiratory effort, CTAB  Psych: Normal mood, affect, and thought processes.     Tish Iverson MD  [facility]

## 2023-08-17 LAB — PROGEST SERPL-MCNC: 15.46 NG/ML

## 2023-08-30 ENCOUNTER — TELEPHONE (OUTPATIENT)
Dept: OBGYN CLINIC | Facility: MEDICAL CENTER | Age: 28
End: 2023-08-30

## 2023-08-30 DIAGNOSIS — Z32.01 PREGNANCY CONFIRMED BY POSITIVE BLOOD TEST: Primary | ICD-10-CM

## 2023-08-30 NOTE — TELEPHONE ENCOUNTER
Patient was rescheduled for her dating ultrasound in office, unable to do the time offered.  Asked to go to Radiology

## 2023-09-06 ENCOUNTER — HOSPITAL ENCOUNTER (OUTPATIENT)
Dept: ULTRASOUND IMAGING | Facility: HOSPITAL | Age: 28
Discharge: HOME | End: 2023-09-06
Attending: OBSTETRICS & GYNECOLOGY
Payer: COMMERCIAL

## 2023-09-06 DIAGNOSIS — Z32.01 PREGNANCY CONFIRMED BY POSITIVE BLOOD TEST: ICD-10-CM

## 2023-09-06 PROCEDURE — 76801 OB US < 14 WKS SINGLE FETUS: CPT

## 2023-09-07 NOTE — RESULT ENCOUNTER NOTE
Please call patient with results. US notable for intrauterine pregnancy, 9w1d by this scan.  Needs OB Intake and H&P.

## 2023-09-18 ENCOUNTER — INITIAL PRENATAL (OUTPATIENT)
Dept: OBGYN CLINIC | Facility: MEDICAL CENTER | Age: 28
End: 2023-09-18

## 2023-09-18 VITALS
DIASTOLIC BLOOD PRESSURE: 64 MMHG | WEIGHT: 199 LBS | SYSTOLIC BLOOD PRESSURE: 118 MMHG | BODY MASS INDEX: 35.26 KG/M2 | HEIGHT: 63 IN

## 2023-09-18 DIAGNOSIS — Z34.81 PRENATAL CARE, SUBSEQUENT PREGNANCY, FIRST TRIMESTER: Primary | ICD-10-CM

## 2023-09-18 PROCEDURE — OBC

## 2023-09-18 RX ORDER — ESCITALOPRAM OXALATE 20 MG/1
20 TABLET ORAL DAILY
COMMUNITY
Start: 2023-06-19 | End: 2023-09-27

## 2023-09-18 NOTE — PROGRESS NOTES
[id number]  OB History    Para Term  AB Living   3 2 2 0 0 2   SAB IAB Ectopic Multiple Live Births   0 0 0 0 2      # Outcome Date GA Lbr Lucian/2nd Weight Sex Delivery Anes PTL Lv   3 Current            2 Term 20 39w1d / 00:11 3205 g (7 lb 1.1 oz) M Vag-Spont EPI N MEDHAT   1 Term 18 40w3d / 02:53 2955 g (6 lb 8.2 oz) M Vag-Spont EPI N MEDHAT      Complications: Meconium aspiration         * Pt presents for OB intake  *[id number]  *Pt's LMP was Patient's last menstrual period was 07/10/2023. *Ultrasound date:2023   9weeks 1days  *Estimated date of delivery: 2024   * confirmed by ultrasound    *Signs/Symptoms of Pregnancy   *yes Constipation    *yes Headaches   *yes Cramping  *yes Spotting   *yes Nausea     Diabetes  If any of the following answers are  yes, please order 1 hour GTT, 50g   *no Hx of GDM    *no BMI >35    *no First degree relative with type 2 diabetes    *no Hx of PCOS   *no Current metformin use    *no Prior hx of LGA/macrosomia    *no AMA with other risk factors    Hypertension-    *no Hx of chronic HTN    *yes Hx of gestational HTN   *no hx of preeclampsia, eclampsia, or HELLP syndrome     *Infection Screening   *no Does the pt have a hx of MRSA? *if yes- follow MRSA protocol and obtain a nasal swab for MRSA culture   *no History of herpes?      *Immunizations:   *yes Discussed influenza vaccine   *yes Discussed TDaP vaccine   *yes Discussed COVID Vaccine      ACTIVE MEDICAL/MENTAL HEALTH CONDITIONS  Depression *no   Anxiety*yes  Medications*no    *Interview education   *Handouts given:    *Baby and Me support center     *[employer] [facility] sign up instructions    *Lab Locations    *[facility] Pediatricians List/Choosing Pediatrician Sheet    * [facility] Childbirth [facility] Parenting Classes    *Schedule for Prenatal Visits    *Pregnancy Warning Signs Reviewed    *Safe Medications During Pregnancy    *SMA and CF Testing information sheet    *CPT Code Sheet/MFM 13week NT pamphlet    *[employer]      SBIRT Screen:  Depression Screening Follow-up Plan: Patient's depression screening was negative with an [place] score of 3         *[facility]   *yes discussed genetic testing- pt interested     Patient has been informed of basic prenatal advice such as avoiding alcohol, drugs, and smoking. She should remain hydrated and take daily prenatal vitamins. Patient should avoid caffeine, raw sprouts, high mercury fish, undercooked fish, raw eggs, organ meat, unwashed produce, and unpasteurized cheeses, milk, and fruit juice and undercooked meats. She has been informed about toxoplasmosis and to avoid cat feces. *Details that I feel the provider should be aware of:  Rosanna Santiago presented today for initial ob intake at [address] [place] [address]. She has complaints about constipation, headaches and nausea -reviewed medications she can take during pregnancy. She has complaints of cramping and spotting - it was a darker color and then a pink/red for about a week. FHT in office today 174. Reviewed warning signs and when to contact office. Prenatal labs ordered, baseline pre-eclampsia labs ordered due to history of gHTN. MFM referral placed    PN1 visit scheduled for *09/27/2023. The patient was oriented to our practice and all questions were answered.     Interviewed by:  Louie Lovett RN

## 2023-09-26 PROBLEM — Z87.59 HISTORY OF GESTATIONAL HYPERTENSION: Status: ACTIVE | Noted: 2023-09-26

## 2023-09-26 PROBLEM — O13.3 GESTATIONAL HYPERTENSION, THIRD TRIMESTER: Status: RESOLVED | Noted: 2020-08-19 | Resolved: 2023-09-26

## 2023-09-26 PROBLEM — Z3A.12 12 WEEKS GESTATION OF PREGNANCY: Status: ACTIVE | Noted: 2023-09-26

## 2023-09-26 PROBLEM — Z3A.39 39 WEEKS GESTATION OF PREGNANCY: Status: RESOLVED | Noted: 2020-04-23 | Resolved: 2023-09-26

## 2023-09-26 PROBLEM — Z30.011 ENCOUNTER FOR INITIAL PRESCRIPTION OF CONTRACEPTIVE PILLS: Status: RESOLVED | Noted: 2020-09-30 | Resolved: 2023-09-26

## 2023-09-26 PROBLEM — Z34.93 THIRD TRIMESTER PREGNANCY: Status: RESOLVED | Noted: 2020-02-17 | Resolved: 2023-09-26

## 2023-09-26 NOTE — PROGRESS NOTES
Prenatal H&P     Denies loss of fluid, vaginal discharge, vaginal bleeding  and abdominal pain. Not feeling fetal movement. Tolerating PNV well. Encouraged to complete prenatal panel and base line labs. Plans to complete labs this weekend. Plans for genetic screening appointment 10/6/23. planned pregnancy. OB history:     G1- 18 term  male 6lb 1.4WX; complicated by meconium aspiration, low pH  - head cooling transferred to [facility] NICU. Was admitted for 4 weeks then at [facility] for 4 weeks. ASD/VSD noted 1-2 weeks of life      G2- 20 term  male 7lb 4.3VH; complicated by gHTN (diagnosed in labor)     G3- current     Dating:     LMP - 7/10/23 JORDAN 4/15/24     US on 23 @  9w 1d JORDAN 24  Working JORDAN 4/15/24 (discrepancy is not >7d @ 9w 6d therfore EDC by LMP)    Surgical history:     Tympanoplasty and wisdom teeth removal    Medical History:     Abnormal Pap smear    Social history:     Alcohol/ tobacco/ illicit drug rare alcohol use prior to pregnancy/denies drug or tobacco use     Denies history of STD/STI     Work/ housing/ / house- stable/ FOB, family and friends supportive     PCP/ Dental last PE  2-3 years ago/ 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 Oldest child born with ASD/VSD. She denies any other  family history of inheritable conditions such as physical or intellectual disabilities, birth defects, blood disorders or neural tube defects. She has never had MRSA. She denies recent travel or travel planned in the near future. Patient Plans   - Feeding breast-feeding  - Contraception uncertain  - Aware office delivers at [facility].  If < 32 weeks will recommend evaluation at [address] [place] [address]:  - Continue prenatal vitamin daily  - Genetic screening/ [facility] appointment scheduled 10/6/23  -history of congenital heart disease oldest child with ASD/VSD reviewed recommendation for fetal ECHO - scheduled through [facility]   -History of gestational hypertension. Encouraged to complete baseline labs. Encouraged to start low-dose aspirin 162 mg daily  -  Weight gain in pregnancy- Who BMI recommend 11-20 lb . Healthy diet and daily exercise reviewed and encouraged  - Unit regimen reviewed visit every 4 weeks until 28 weeks, every 2 weeks until 36 weeks and then weekly until delivery. - pap smear, gonorrhea and chlamydia collected. - Vaccines in Pregnancy      - TDAP vaccine after 27 gestational weeks     - Reviewed with patient  Pregnancy with COVID infection is considered  high risk and can have poor outcome including  delivery, more severe infection. therefore  pregnant patients are recommended to get  COVID-19 vaccination. Written information provided. Had J&J x 1  10/2021      - influenza October- March  NA   -  Common discomforts of pregnancy and precautions reviewed. Signs and symptoms to report reviewed. Encouraged social distancing, good hand hygiene, masking, avoiding crowds and written information provided about COVID-19.   RTO 4 weeks

## 2023-09-27 ENCOUNTER — INITIAL PRENATAL (OUTPATIENT)
Dept: OBGYN CLINIC | Facility: MEDICAL CENTER | Age: 28
End: 2023-09-27

## 2023-09-27 VITALS
BODY MASS INDEX: 35.44 KG/M2 | HEIGHT: 63 IN | DIASTOLIC BLOOD PRESSURE: 68 MMHG | WEIGHT: 200 LBS | SYSTOLIC BLOOD PRESSURE: 116 MMHG

## 2023-09-27 DIAGNOSIS — Z3A.11 11 WEEKS GESTATION OF PREGNANCY: ICD-10-CM

## 2023-09-27 DIAGNOSIS — Z12.4 ENCOUNTER FOR SCREENING FOR CERVICAL CANCER: Primary | ICD-10-CM

## 2023-09-27 DIAGNOSIS — O09.299 CURRENT SINGLETON PREGNANCY WITH HISTORY OF CONGENITAL HEART DISEASE IN PRIOR CHILD, ANTEPARTUM: ICD-10-CM

## 2023-09-27 DIAGNOSIS — Z11.3 ROUTINE SCREENING FOR STI (SEXUALLY TRANSMITTED INFECTION): ICD-10-CM

## 2023-09-27 DIAGNOSIS — O99.211 OBESITY AFFECTING PREGNANCY IN FIRST TRIMESTER: ICD-10-CM

## 2023-09-27 DIAGNOSIS — Z87.59 HISTORY OF GESTATIONAL HYPERTENSION: ICD-10-CM

## 2023-09-27 PROCEDURE — G0145 SCR C/V CYTO,THINLAYER,RESCR: HCPCS | Performed by: NURSE PRACTITIONER

## 2023-09-27 PROCEDURE — 87491 CHLMYD TRACH DNA AMP PROBE: CPT | Performed by: NURSE PRACTITIONER

## 2023-09-27 PROCEDURE — PNV: Performed by: NURSE PRACTITIONER

## 2023-09-27 PROCEDURE — 87591 N.GONORRHOEAE DNA AMP PROB: CPT | Performed by: NURSE PRACTITIONER

## 2023-09-27 RX ORDER — ASPIRIN 81 MG/1
162 TABLET, CHEWABLE ORAL DAILY
Qty: 60 TABLET | Refills: 5 | Status: SHIPPED | OUTPATIENT
Start: 2023-09-27 | End: 2024-03-20

## 2023-09-27 NOTE — LETTER
September 27, 2023     Patient: Raven Curiel  YOB: 1995  Date of Visit: 9/27/2023      To Whom it May Concern:    Alvaro Siddiqi is under my professional care. Katie Magana was seen in my office on 9/27/2023. Katie Magana may return to work on 9/27/23 . If you have any questions or concerns, please don't hesitate to call.          Sincerely,          RYAN Alanis        CC: No Recipients

## 2023-09-28 LAB
C TRACH DNA SPEC QL NAA+PROBE: NEGATIVE
N GONORRHOEA DNA SPEC QL NAA+PROBE: NEGATIVE

## 2023-10-02 ENCOUNTER — APPOINTMENT (OUTPATIENT)
Dept: LAB | Age: 28
End: 2023-10-02
Payer: COMMERCIAL

## 2023-10-02 DIAGNOSIS — Z34.81 PRENATAL CARE, SUBSEQUENT PREGNANCY, FIRST TRIMESTER: ICD-10-CM

## 2023-10-02 LAB
ABO GROUP BLD: NORMAL
ALBUMIN SERPL BCP-MCNC: 3.8 G/DL (ref 3.5–5)
ALP SERPL-CCNC: 43 U/L (ref 34–104)
ALT SERPL W P-5'-P-CCNC: 8 U/L (ref 7–52)
ANION GAP SERPL CALCULATED.3IONS-SCNC: 10 MMOL/L
AST SERPL W P-5'-P-CCNC: 12 U/L (ref 13–39)
BASOPHILS # BLD AUTO: 0.01 THOUSANDS/ÂΜL (ref 0–0.1)
BASOPHILS NFR BLD AUTO: 0 % (ref 0–1)
BILIRUB SERPL-MCNC: 0.33 MG/DL (ref 0.2–1)
BILIRUB UR QL STRIP: NEGATIVE
BLD GP AB SCN SERPL QL: NEGATIVE
BUN SERPL-MCNC: 8 MG/DL (ref 5–25)
CALCIUM SERPL-MCNC: 9 MG/DL (ref 8.4–10.2)
CHLORIDE SERPL-SCNC: 102 MMOL/L (ref 96–108)
CLARITY UR: CLEAR
CO2 SERPL-SCNC: 23 MMOL/L (ref 21–32)
COLOR UR: COLORLESS
CREAT SERPL-MCNC: 0.65 MG/DL (ref 0.6–1.3)
CREAT UR-MCNC: 53.5 MG/DL
EOSINOPHIL # BLD AUTO: 0.11 THOUSAND/ÂΜL (ref 0–0.61)
EOSINOPHIL NFR BLD AUTO: 2 % (ref 0–6)
ERYTHROCYTE [DISTWIDTH] IN BLOOD BY AUTOMATED COUNT: 11.9 % (ref 11.6–15.1)
GFR SERPL CREATININE-BSD FRML MDRD: 120 ML/MIN/1.73SQ M
GLUCOSE P FAST SERPL-MCNC: 86 MG/DL (ref 65–99)
GLUCOSE UR STRIP-MCNC: NEGATIVE MG/DL
HBV SURFACE AB SER-ACNC: <3 MIU/ML
HBV SURFACE AG SER QL: NORMAL
HCT VFR BLD AUTO: 35.8 % (ref 34.8–46.1)
HCV AB SER QL: NORMAL
HGB BLD-MCNC: 11.9 G/DL (ref 11.5–15.4)
HGB UR QL STRIP.AUTO: NEGATIVE
HIV 1+2 AB+HIV1 P24 AG SERPL QL IA: NORMAL
HIV 2 AB SERPL QL IA: NORMAL
HIV1 AB SERPL QL IA: NORMAL
HIV1 P24 AG SERPL QL IA: NORMAL
IMM GRANULOCYTES # BLD AUTO: 0.04 THOUSAND/UL (ref 0–0.2)
IMM GRANULOCYTES NFR BLD AUTO: 1 % (ref 0–2)
KETONES UR STRIP-MCNC: NEGATIVE MG/DL
LEUKOCYTE ESTERASE UR QL STRIP: NEGATIVE
LYMPHOCYTES # BLD AUTO: 1.47 THOUSANDS/ÂΜL (ref 0.6–4.47)
LYMPHOCYTES NFR BLD AUTO: 20 % (ref 14–44)
MCH RBC QN AUTO: 30.1 PG (ref 26.8–34.3)
MCHC RBC AUTO-ENTMCNC: 33.2 G/DL (ref 31.4–37.4)
MCV RBC AUTO: 91 FL (ref 82–98)
MONOCYTES # BLD AUTO: 0.33 THOUSAND/ÂΜL (ref 0.17–1.22)
MONOCYTES NFR BLD AUTO: 4 % (ref 4–12)
NEUTROPHILS # BLD AUTO: 5.54 THOUSANDS/ÂΜL (ref 1.85–7.62)
NEUTS SEG NFR BLD AUTO: 73 % (ref 43–75)
NITRITE UR QL STRIP: NEGATIVE
NRBC BLD AUTO-RTO: 0 /100 WBCS
PH UR STRIP.AUTO: 6.5 [PH]
PLATELET # BLD AUTO: 130 THOUSANDS/UL (ref 149–390)
PMV BLD AUTO: 10.3 FL (ref 8.9–12.7)
POTASSIUM SERPL-SCNC: 4 MMOL/L (ref 3.5–5.3)
PROT SERPL-MCNC: 6.5 G/DL (ref 6.4–8.4)
PROT UR STRIP-MCNC: NEGATIVE MG/DL
PROT UR-MCNC: <4 MG/DL
RBC # BLD AUTO: 3.95 MILLION/UL (ref 3.81–5.12)
RH BLD: POSITIVE
RUBV IGG SERPL IA-ACNC: 24.8 IU/ML
SODIUM SERPL-SCNC: 135 MMOL/L (ref 135–147)
SP GR UR STRIP.AUTO: 1.01 (ref 1–1.03)
SPECIMEN EXPIRATION DATE: NORMAL
TREPONEMA PALLIDUM IGG+IGM AB [PRESENCE] IN SERUM OR PLASMA BY IMMUNOASSAY: NORMAL
URATE SERPL-MCNC: 4.1 MG/DL (ref 2–7.5)
UROBILINOGEN UR STRIP-ACNC: <2 MG/DL
WBC # BLD AUTO: 7.5 THOUSAND/UL (ref 4.31–10.16)

## 2023-10-02 PROCEDURE — 87340 HEPATITIS B SURFACE AG IA: CPT

## 2023-10-02 PROCEDURE — 36415 COLL VENOUS BLD VENIPUNCTURE: CPT

## 2023-10-02 PROCEDURE — 86762 RUBELLA ANTIBODY: CPT

## 2023-10-02 PROCEDURE — 86901 BLOOD TYPING SEROLOGIC RH(D): CPT

## 2023-10-02 PROCEDURE — 86900 BLOOD TYPING SEROLOGIC ABO: CPT

## 2023-10-02 PROCEDURE — 84156 ASSAY OF PROTEIN URINE: CPT

## 2023-10-02 PROCEDURE — 86706 HEP B SURFACE ANTIBODY: CPT

## 2023-10-02 PROCEDURE — 85025 COMPLETE CBC W/AUTO DIFF WBC: CPT

## 2023-10-02 PROCEDURE — 82570 ASSAY OF URINE CREATININE: CPT

## 2023-10-02 PROCEDURE — 84550 ASSAY OF BLOOD/URIC ACID: CPT

## 2023-10-02 PROCEDURE — 80053 COMPREHEN METABOLIC PANEL: CPT

## 2023-10-02 PROCEDURE — 86780 TREPONEMA PALLIDUM: CPT

## 2023-10-02 PROCEDURE — 86850 RBC ANTIBODY SCREEN: CPT

## 2023-10-02 PROCEDURE — 87389 HIV-1 AG W/HIV-1&-2 AB AG IA: CPT

## 2023-10-02 PROCEDURE — 87086 URINE CULTURE/COLONY COUNT: CPT

## 2023-10-02 PROCEDURE — 86803 HEPATITIS C AB TEST: CPT

## 2023-10-03 LAB — BACTERIA UR CULT: NORMAL

## 2023-10-04 LAB
LAB AP GYN PRIMARY INTERPRETATION: NORMAL
Lab: NORMAL

## 2023-10-06 ENCOUNTER — ROUTINE PRENATAL (OUTPATIENT)
Dept: PERINATAL CARE | Facility: OTHER | Age: 28
End: 2023-10-06
Payer: COMMERCIAL

## 2023-10-06 VITALS
BODY MASS INDEX: 35.83 KG/M2 | WEIGHT: 202.2 LBS | HEIGHT: 63 IN | SYSTOLIC BLOOD PRESSURE: 106 MMHG | DIASTOLIC BLOOD PRESSURE: 60 MMHG | HEART RATE: 92 BPM

## 2023-10-06 DIAGNOSIS — O09.299 CURRENT SINGLETON PREGNANCY WITH HISTORY OF CONGENITAL HEART DISEASE IN PRIOR CHILD, ANTEPARTUM: ICD-10-CM

## 2023-10-06 DIAGNOSIS — Z13.79 GENETIC SCREENING: ICD-10-CM

## 2023-10-06 DIAGNOSIS — Z34.81 PRENATAL CARE, SUBSEQUENT PREGNANCY, FIRST TRIMESTER: ICD-10-CM

## 2023-10-06 DIAGNOSIS — O99.211 SEVERE OBESITY DUE TO EXCESS CALORIES AFFECTING PREGNANCY IN FIRST TRIMESTER (HCC): Primary | ICD-10-CM

## 2023-10-06 DIAGNOSIS — E66.01 SEVERE OBESITY DUE TO EXCESS CALORIES AFFECTING PREGNANCY IN FIRST TRIMESTER (HCC): Primary | ICD-10-CM

## 2023-10-06 DIAGNOSIS — Z3A.12 12 WEEKS GESTATION OF PREGNANCY: ICD-10-CM

## 2023-10-06 DIAGNOSIS — Z36.82 NUCHAL TRANSLUCENCY OF FETUS ON PRENATAL ULTRASOUND: ICD-10-CM

## 2023-10-06 PROCEDURE — 76813 OB US NUCHAL MEAS 1 GEST: CPT | Performed by: OBSTETRICS & GYNECOLOGY

## 2023-10-06 PROCEDURE — 36415 COLL VENOUS BLD VENIPUNCTURE: CPT | Performed by: OBSTETRICS & GYNECOLOGY

## 2023-10-06 PROCEDURE — 99213 OFFICE O/P EST LOW 20 MIN: CPT | Performed by: OBSTETRICS & GYNECOLOGY

## 2023-10-06 NOTE — PROGRESS NOTES
Patient chose to have [facility] Non-invasive Prenatal Screen with fetal sex. Patient given brochure and is aware [employer] will contact their insurance and coordinate coverage. Patient made aware she will need to respond to text message or e-mail from [address] within 2 business days or testing will be run through insurance. Patient informed text message will come from area code  "415". Provided [employer] # [phone] and web site : [email or web address].   "[place] your test online" card with barcode and test tube ID provided to patient. Reviewed [employer] web site states 5-7 business days for results via their portal.   [employer]) message will be sent to patient when [facility] receives results /provider reviews. 2 vials of blood drawn from  right arm by Leonia Najjar RN. Patient tolerated blood draw without difficulty. Specimens labeled with patient identifiers (name, date of birth, specimen collection date), order and specimen were verified with patient, packed and sent via [address]. [employer]  tracking #  [id number]  Copy of lab order scanned to Epic media. Maternal Fetal Medicine will have results in approximately 7-10 business days and will call patient or notify via [address] [place] [address]. Patient aware viewing lab result online will reveal fetal sex if ordered. Patient verbalized understanding of all instructions and no questions at this time.

## 2023-10-06 NOTE — LETTER
October 7, 2023     Anuel Cutler MD  [address]    Patient: Alli Alexander   YOB: 1995   Date of Visit: 10/6/2023       Dear Dr. Ina White:    Thank you for referring Cheryle Sil to me for evaluation. Below are my notes for this consultation. If you have questions, please do not hesitate to call me. I look forward to following your patient along with you. Sincerely,        Tasha Blue MD        CC: No Recipients    Tasha Blue MD  10/7/2023 12:53 PM  Sign when Signing Visit  A fetal ultrasound was completed. See Ob procedures in Epic for an interpretation and recommendations. Do not hesitate to contact us in [facility] if you have questions. Barby Johnson MD, [address]  Maternal Fetal Medicine

## 2023-10-07 PROBLEM — Z3A.12 12 WEEKS GESTATION OF PREGNANCY: Status: ACTIVE | Noted: 2023-10-07

## 2023-10-07 NOTE — PROGRESS NOTES
A fetal ultrasound was completed. See Ob procedures in Epic for an interpretation and recommendations. Do not hesitate to contact us in [facility] if you have questions. Anabelle Akbar MD, [address] [facility]  Maternal Fetal Medicine

## 2023-10-16 ENCOUNTER — TELEPHONE (OUTPATIENT)
Dept: PERINATAL CARE | Facility: OTHER | Age: 28
End: 2023-10-16

## 2023-10-16 NOTE — TELEPHONE ENCOUNTER
----- Message from Marisa Pimentel MD sent at 10/13/2023  3:20 PM EDT -----  I have reviewed the patient's lab results which are normal.  Please contact the patient to inform her of the normal results, unless Ms Huseyin Morales has already reviewed the results using [employer].       Marisa Pimentel

## 2023-10-16 NOTE — TELEPHONE ENCOUNTER
Left voicemail for Teresa Chandler reviewing her non invasive prenatal screening (NIPS) results screened low risk, fetal sex not revealed. Explained if Teresa Chandler would like to know the fetal sex of her baby, encouraged to review test results in mychart. If patient wishes not want to know fetal sex, advised to not open her test result in mychart. Advised there is routine blood screening for spina bifida that is recommended to complete in the second trimester (15-20 weeks) that will be completed through your obstetrician's office. Contact the  office at [phone] with any questions.

## 2023-10-27 ENCOUNTER — ROUTINE PRENATAL (OUTPATIENT)
Dept: OBGYN CLINIC | Facility: MEDICAL CENTER | Age: 28
End: 2023-10-27
Payer: COMMERCIAL

## 2023-10-27 VITALS — DIASTOLIC BLOOD PRESSURE: 68 MMHG | BODY MASS INDEX: 35.96 KG/M2 | SYSTOLIC BLOOD PRESSURE: 118 MMHG | WEIGHT: 203 LBS

## 2023-10-27 DIAGNOSIS — Z23 NEEDS FLU SHOT: ICD-10-CM

## 2023-10-27 DIAGNOSIS — Z3A.15 15 WEEKS GESTATION OF PREGNANCY: ICD-10-CM

## 2023-10-27 DIAGNOSIS — Z87.59 HISTORY OF GESTATIONAL HYPERTENSION: ICD-10-CM

## 2023-10-27 DIAGNOSIS — O09.299 CURRENT SINGLETON PREGNANCY WITH HISTORY OF CONGENITAL HEART DISEASE IN PRIOR CHILD, ANTEPARTUM: Primary | ICD-10-CM

## 2023-10-27 DIAGNOSIS — O99.211 OBESITY AFFECTING PREGNANCY IN FIRST TRIMESTER, UNSPECIFIED OBESITY TYPE: ICD-10-CM

## 2023-10-27 PROBLEM — Z3A.11 11 WEEKS GESTATION OF PREGNANCY: Status: RESOLVED | Noted: 2023-09-26 | Resolved: 2023-10-27

## 2023-10-27 PROCEDURE — 90471 IMMUNIZATION ADMIN: CPT | Performed by: STUDENT IN AN ORGANIZED HEALTH CARE EDUCATION/TRAINING PROGRAM

## 2023-10-27 PROCEDURE — 90686 IIV4 VACC NO PRSV 0.5 ML IM: CPT | Performed by: STUDENT IN AN ORGANIZED HEALTH CARE EDUCATION/TRAINING PROGRAM

## 2023-10-27 PROCEDURE — PNV: Performed by: STUDENT IN AN ORGANIZED HEALTH CARE EDUCATION/TRAINING PROGRAM

## 2023-10-27 NOTE — PROGRESS NOTES
Routine Prenatal Visit  [facility] [address] [place] [address]    Assessment/Plan:  Trice Jacobson is a 25y.o. year old  at 15w4d who presents for routine prenatal visit. 1. Current duncan pregnancy with history of congenital heart disease in prior child, antepartum    2. History of gestational hypertension    3. Obesity affecting pregnancy in first trimester, unspecified obesity type    4. 15 weeks gestation of pregnancy  Assessment & Plan:  - Discussed mSAFP, she declines  - Flu vaccine given      5. Needs flu shot  -     influenza vaccine, quadrivalent, 0.5 mL, preservative-free, for adult and pediatric patients 6 mos+ (AFLURIA, FLUARIX, FLULAVAL, FLUZONE)          Subjective:     CC: Prenatal care    Erum Barney is a 25 y.o.  female who presents for routine prenatal care at 15w4d. Pregnancy ROS: Denies leakage of fluid, pelvic pain, or vaginal bleeding. Reports normal fetal movement. The following portions of the patient's history were reviewed and updated as appropriate: allergies, current medications, past family history, past medical history, obstetric history, gynecologic history, past social history, past surgical history and problem list.      Objective:  /68   Wt 92.1 kg (203 lb)   LMP 07/10/2023   BMI 35.96 kg/m²   Pregravid Weight/BMI: 83.9 kg (185 lb) (BMI 32.78)  Current Weight: 92.1 kg (203 lb)   Total Weight Gain: 8.165 kg (18 lb)   Pre-Travis Vitals      Flowsheet Row Most Recent Value   Prenatal Assessment    Fetal Heart Rate 154   Movement Absent   Prenatal Vitals    Blood Pressure 118/68   Weight - Scale 92.1 kg (203 lb)   Urine Albumin/Glucose    Dilation/Effacement/Station    Vaginal Drainage    Draining Fluid No   Edema    LLE Edema None   RLE Edema None   Facial Edema None             General: Well appearing, no distress  Respiratory: Unlabored breathing  Cardiovascular: Regular rate.   Abdomen: Soft, gravid, nontender  Fundal Height: Appropriate for gestational age. Extremities: Warm and well perfused. Non tender.

## 2023-11-21 PROBLEM — Z3A.19 19 WEEKS GESTATION OF PREGNANCY: Status: ACTIVE | Noted: 2023-10-07

## 2023-11-24 ENCOUNTER — ROUTINE PRENATAL (OUTPATIENT)
Dept: OBGYN CLINIC | Facility: MEDICAL CENTER | Age: 28
End: 2023-11-24

## 2023-11-24 VITALS — BODY MASS INDEX: 36.54 KG/M2 | DIASTOLIC BLOOD PRESSURE: 62 MMHG | SYSTOLIC BLOOD PRESSURE: 92 MMHG | WEIGHT: 206.3 LBS

## 2023-11-24 DIAGNOSIS — O99.211 SEVERE OBESITY DUE TO EXCESS CALORIES AFFECTING PREGNANCY IN FIRST TRIMESTER (HCC): ICD-10-CM

## 2023-11-24 DIAGNOSIS — Z3A.19 19 WEEKS GESTATION OF PREGNANCY: ICD-10-CM

## 2023-11-24 DIAGNOSIS — O09.299 CURRENT SINGLETON PREGNANCY WITH HISTORY OF CONGENITAL HEART DISEASE IN PRIOR CHILD, ANTEPARTUM: Primary | ICD-10-CM

## 2023-11-24 DIAGNOSIS — B37.31 VAGINAL YEAST INFECTION: ICD-10-CM

## 2023-11-24 DIAGNOSIS — Z87.59 HISTORY OF GESTATIONAL HYPERTENSION: ICD-10-CM

## 2023-11-24 DIAGNOSIS — E66.01 SEVERE OBESITY DUE TO EXCESS CALORIES AFFECTING PREGNANCY IN FIRST TRIMESTER (HCC): ICD-10-CM

## 2023-11-24 PROCEDURE — PNV: Performed by: NURSE PRACTITIONER

## 2023-11-24 RX ORDER — PENICILLIN V POTASSIUM 500 MG/1
TABLET ORAL
COMMUNITY
Start: 2023-11-22

## 2023-11-24 NOTE — PROGRESS NOTES
Denies loss of fluid, vaginal bleeding and abdominal pain. Confirms fetal movement, flutters. Tolerating prenatal vitamin and low-dose aspirin well. Was recently started on penicillin for sore throat. Is concerned with vaginal itching over the past few weeks. Denies significant difference in discharge or odor. Denies alleviating or aggravating factors. Has not used any OTC remedies. PE: Moderate amount of thick white vaginal discharge adherent to vaginal walls noted on exam  BP: 92/62 weight: +32lbs   Plan  -Continue prenatal vitamin low-dose aspirin daily  -Reviewed diagnosis of vaginal yeast infection and treatment with miconazole  -Follow-up with  center scheduled for 23  -Common discomforts of pregnancy and precautions reviewed. Signs and symptoms to report reviewed.   RTO 4 weeks

## 2023-11-30 PROBLEM — O09.892 PRIOR PREGNANCY COMPLICATED BY PIH, ANTEPARTUM, SECOND TRIMESTER: Status: ACTIVE | Noted: 2023-09-26

## 2023-12-01 ENCOUNTER — ROUTINE PRENATAL (OUTPATIENT)
Dept: PERINATAL CARE | Facility: OTHER | Age: 28
End: 2023-12-01
Payer: COMMERCIAL

## 2023-12-01 VITALS
HEART RATE: 93 BPM | DIASTOLIC BLOOD PRESSURE: 62 MMHG | HEIGHT: 63 IN | SYSTOLIC BLOOD PRESSURE: 102 MMHG | BODY MASS INDEX: 36.93 KG/M2 | WEIGHT: 208.4 LBS

## 2023-12-01 DIAGNOSIS — Z3A.20 20 WEEKS GESTATION OF PREGNANCY: ICD-10-CM

## 2023-12-01 DIAGNOSIS — O09.892 PRIOR PREGNANCY COMPLICATED BY PIH, ANTEPARTUM, SECOND TRIMESTER: ICD-10-CM

## 2023-12-01 DIAGNOSIS — Z36.86 ENCOUNTER FOR ANTENATAL SCREENING FOR CERVICAL LENGTH: ICD-10-CM

## 2023-12-01 DIAGNOSIS — Z36.3 ENCOUNTER FOR ANTENATAL SCREENING FOR MALFORMATIONS: ICD-10-CM

## 2023-12-01 DIAGNOSIS — O99.212 SEVERE OBESITY DUE TO EXCESS CALORIES AFFECTING PREGNANCY IN SECOND TRIMESTER (HCC): ICD-10-CM

## 2023-12-01 DIAGNOSIS — E66.01 SEVERE OBESITY DUE TO EXCESS CALORIES AFFECTING PREGNANCY IN SECOND TRIMESTER (HCC): ICD-10-CM

## 2023-12-01 DIAGNOSIS — O09.299 CURRENT SINGLETON PREGNANCY WITH HISTORY OF CONGENITAL HEART DISEASE IN PRIOR CHILD, ANTEPARTUM: Primary | ICD-10-CM

## 2023-12-01 PROCEDURE — 76811 OB US DETAILED SNGL FETUS: CPT | Performed by: STUDENT IN AN ORGANIZED HEALTH CARE EDUCATION/TRAINING PROGRAM

## 2023-12-01 PROCEDURE — 76817 TRANSVAGINAL US OBSTETRIC: CPT | Performed by: STUDENT IN AN ORGANIZED HEALTH CARE EDUCATION/TRAINING PROGRAM

## 2023-12-01 PROCEDURE — 99213 OFFICE O/P EST LOW 20 MIN: CPT | Performed by: STUDENT IN AN ORGANIZED HEALTH CARE EDUCATION/TRAINING PROGRAM

## 2023-12-01 NOTE — PROGRESS NOTES
Ultrasound Probe Disinfection    A transvaginal ultrasound was performed. Prior to use, disinfection was performed with High Level Disinfection Process (Trophon). Probe serial number F1: [id number]  was used.     Chaperone: SERENE Candelaria RDMS

## 2023-12-01 NOTE — PROGRESS NOTES
[facility]: Ms. Jose Angel Arias was seen today for anatomic survey and cervical length screening ultrasound. See ultrasound report under "OB Procedures" tab. The time spent on this established patient on the encounter date included 5 minutes previsit service time reviewing records and precharting, 5 minutes face-to-face service time counseling regarding results and coordinating care, and  5 minutes charting, totalling 15 minutes. Please don't hesitate to contact our office with any concerns or questions.   -Bernie Moscoso MD

## 2023-12-15 ENCOUNTER — ROUTINE PRENATAL (OUTPATIENT)
Dept: PERINATAL CARE | Facility: OTHER | Age: 28
End: 2023-12-15
Payer: COMMERCIAL

## 2023-12-15 VITALS
SYSTOLIC BLOOD PRESSURE: 116 MMHG | BODY MASS INDEX: 37.39 KG/M2 | WEIGHT: 211 LBS | HEIGHT: 63 IN | HEART RATE: 98 BPM | DIASTOLIC BLOOD PRESSURE: 64 MMHG

## 2023-12-15 DIAGNOSIS — Z3A.22 22 WEEKS GESTATION OF PREGNANCY: ICD-10-CM

## 2023-12-15 DIAGNOSIS — O35.2XX0 PREVIOUS CHILD BORN WITH VENTRICULAR SEPTAL DEFECT, CURRENTLY PREGNANT, SINGLE OR UNSPECIFIED FETUS: Primary | ICD-10-CM

## 2023-12-15 PROCEDURE — 76825 ECHO EXAM OF FETAL HEART: CPT | Performed by: OBSTETRICS & GYNECOLOGY

## 2023-12-15 PROCEDURE — 93325 DOPPLER ECHO COLOR FLOW MAPG: CPT | Performed by: OBSTETRICS & GYNECOLOGY

## 2023-12-15 PROCEDURE — 76827 ECHO EXAM OF FETAL HEART: CPT | Performed by: OBSTETRICS & GYNECOLOGY

## 2023-12-15 NOTE — LETTER
December 15, 2023     Cecy Lopez MD  [address] [facility]    Patient: Lakisha Caballero   YOB: 1995   Date of Visit: 12/15/2023       Dear Dr. Sandra Soni: Thank you for referring Lucero Jerez to me for evaluation. Below are my notes for this consultation. If you have questions, please do not hesitate to call me. I look forward to following your patient along with you.          Sincerely,        Meet Brooks MD        CC: No Recipients    Meet Brooks MD  12/15/2023  3:52 PM  Sign when Signing Visit  Please refer to the [facility] ultrasound report in Ob Procedures for additional information regarding today's visit

## 2023-12-15 NOTE — PROGRESS NOTES
Please refer to the [facility] ultrasound report in Ob Procedures for additional information regarding today's visit

## 2023-12-22 ENCOUNTER — ROUTINE PRENATAL (OUTPATIENT)
Dept: OBGYN CLINIC | Facility: MEDICAL CENTER | Age: 28
End: 2023-12-22

## 2023-12-22 VITALS — WEIGHT: 212.3 LBS | DIASTOLIC BLOOD PRESSURE: 70 MMHG | BODY MASS INDEX: 37.61 KG/M2 | SYSTOLIC BLOOD PRESSURE: 118 MMHG

## 2023-12-22 DIAGNOSIS — O09.299 CURRENT SINGLETON PREGNANCY WITH HISTORY OF CONGENITAL HEART DISEASE IN PRIOR CHILD, ANTEPARTUM: ICD-10-CM

## 2023-12-22 DIAGNOSIS — Z3A.28 28 WEEKS GESTATION OF PREGNANCY: Primary | ICD-10-CM

## 2023-12-22 DIAGNOSIS — O09.892 PRIOR PREGNANCY COMPLICATED BY PIH, ANTEPARTUM, SECOND TRIMESTER: ICD-10-CM

## 2023-12-22 DIAGNOSIS — Z3A.24 24 WEEKS GESTATION OF PREGNANCY: ICD-10-CM

## 2023-12-22 PROCEDURE — PNV: Performed by: OBSTETRICS & GYNECOLOGY

## 2023-12-26 PROBLEM — Z3A.24 24 WEEKS GESTATION OF PREGNANCY: Status: ACTIVE | Noted: 2023-10-07

## 2023-12-26 NOTE — ASSESSMENT & PLAN NOTE
- declined genetic testing   Level 2 reviewed -   Next scan is 2/16/23    28 week labs prior to next visit

## 2023-12-26 NOTE — PROGRESS NOTES
Problem List Items Addressed This Visit          Other    Current duncan pregnancy with history of congenital heart disease in prior child, antepartum     Echo completed last scan reviewed - 12/15/23 normal          Prior pregnancy complicated by PIH, antepartum, second trimester     Baseline labs - normal          24 weeks gestation of pregnancy     - declined genetic testing   Level 2 reviewed -   Next scan is 2/16/23    28 week labs prior to next visit           Other Visit Diagnoses       28 weeks gestation of pregnancy    -  Primary    Relevant Orders    Glucose, 1H PG    CBC and differential    RPR-Syphilis Screening (Total Syphilis IGG/IGM)    Anemia Panel w/Reflex, OB

## 2024-01-16 PROBLEM — O09.893 PRIOR PREGNANCY COMPLICATED BY PIH, ANTEPARTUM, THIRD TRIMESTER: Status: ACTIVE | Noted: 2023-09-26

## 2024-01-16 PROBLEM — Z3A.27 27 WEEKS GESTATION OF PREGNANCY: Status: ACTIVE | Noted: 2023-10-07

## 2024-01-17 ENCOUNTER — TELEPHONE (OUTPATIENT)
Dept: OBGYN CLINIC | Facility: MEDICAL CENTER | Age: 29
End: 2024-01-17

## 2024-01-19 ENCOUNTER — ROUTINE PRENATAL (OUTPATIENT)
Dept: OBGYN CLINIC | Facility: MEDICAL CENTER | Age: 29
End: 2024-01-19
Payer: COMMERCIAL

## 2024-01-19 ENCOUNTER — APPOINTMENT (OUTPATIENT)
Dept: LAB | Age: 29
End: 2024-01-19
Payer: COMMERCIAL

## 2024-01-19 VITALS — DIASTOLIC BLOOD PRESSURE: 60 MMHG | WEIGHT: 215 LBS | SYSTOLIC BLOOD PRESSURE: 130 MMHG | BODY MASS INDEX: 38.09 KG/M2

## 2024-01-19 DIAGNOSIS — O99.212 SEVERE OBESITY DUE TO EXCESS CALORIES AFFECTING PREGNANCY IN SECOND TRIMESTER (HCC): Primary | ICD-10-CM

## 2024-01-19 DIAGNOSIS — Z23 ENCOUNTER FOR IMMUNIZATION: ICD-10-CM

## 2024-01-19 DIAGNOSIS — O09.299 CURRENT SINGLETON PREGNANCY WITH HISTORY OF CONGENITAL HEART DISEASE IN PRIOR CHILD, ANTEPARTUM: ICD-10-CM

## 2024-01-19 DIAGNOSIS — E66.01 SEVERE OBESITY DUE TO EXCESS CALORIES AFFECTING PREGNANCY IN SECOND TRIMESTER (HCC): Primary | ICD-10-CM

## 2024-01-19 DIAGNOSIS — Z3A.28 28 WEEKS GESTATION OF PREGNANCY: ICD-10-CM

## 2024-01-19 DIAGNOSIS — Z3A.27 27 WEEKS GESTATION OF PREGNANCY: ICD-10-CM

## 2024-01-19 LAB
BASOPHILS # BLD AUTO: 0.03 THOUSANDS/ÂΜL (ref 0–0.1)
BASOPHILS NFR BLD AUTO: 0 % (ref 0–1)
DME PARACHUTE DELIVERY DATE REQUESTED: NORMAL
DME PARACHUTE ITEM DESCRIPTION: NORMAL
DME PARACHUTE ORDER STATUS: NORMAL
DME PARACHUTE SUPPLIER NAME: NORMAL
DME PARACHUTE SUPPLIER PHONE: NORMAL
EOSINOPHIL # BLD AUTO: 0.16 THOUSAND/ÂΜL (ref 0–0.61)
EOSINOPHIL NFR BLD AUTO: 2 % (ref 0–6)
ERYTHROCYTE [DISTWIDTH] IN BLOOD BY AUTOMATED COUNT: 13.1 % (ref 11.6–15.1)
FERRITIN SERPL-MCNC: 32 NG/ML (ref 11–307)
GLUCOSE 1H P 50 G GLC PO SERPL-MCNC: 129 MG/DL (ref 40–134)
HCT VFR BLD AUTO: 30.3 % (ref 34.8–46.1)
HGB BLD-MCNC: 9.7 G/DL (ref 11.5–15.4)
IMM GRANULOCYTES # BLD AUTO: 0.12 THOUSAND/UL (ref 0–0.2)
IMM GRANULOCYTES NFR BLD AUTO: 1 % (ref 0–2)
LYMPHOCYTES # BLD AUTO: 1.79 THOUSANDS/ÂΜL (ref 0.6–4.47)
LYMPHOCYTES NFR BLD AUTO: 21 % (ref 14–44)
MCH RBC QN AUTO: 29.5 PG (ref 26.8–34.3)
MCHC RBC AUTO-ENTMCNC: 32 G/DL (ref 31.4–37.4)
MCV RBC AUTO: 92 FL (ref 82–98)
MONOCYTES # BLD AUTO: 0.3 THOUSAND/ÂΜL (ref 0.17–1.22)
MONOCYTES NFR BLD AUTO: 4 % (ref 4–12)
NEUTROPHILS # BLD AUTO: 6.2 THOUSANDS/ÂΜL (ref 1.85–7.62)
NEUTS SEG NFR BLD AUTO: 72 % (ref 43–75)
NRBC BLD AUTO-RTO: 0 /100 WBCS
PLATELET # BLD AUTO: 120 THOUSANDS/UL (ref 149–390)
PMV BLD AUTO: 9.9 FL (ref 8.9–12.7)
RBC # BLD AUTO: 3.29 MILLION/UL (ref 3.81–5.12)
TREPONEMA PALLIDUM IGG+IGM AB [PRESENCE] IN SERUM OR PLASMA BY IMMUNOASSAY: NORMAL
WBC # BLD AUTO: 8.6 THOUSAND/UL (ref 4.31–10.16)

## 2024-01-19 PROCEDURE — 90471 IMMUNIZATION ADMIN: CPT | Performed by: NURSE PRACTITIONER

## 2024-01-19 PROCEDURE — PNV: Performed by: NURSE PRACTITIONER

## 2024-01-19 PROCEDURE — 36415 COLL VENOUS BLD VENIPUNCTURE: CPT

## 2024-01-19 PROCEDURE — 90715 TDAP VACCINE 7 YRS/> IM: CPT | Performed by: NURSE PRACTITIONER

## 2024-01-19 PROCEDURE — 86780 TREPONEMA PALLIDUM: CPT

## 2024-01-19 PROCEDURE — 82728 ASSAY OF FERRITIN: CPT

## 2024-01-19 NOTE — PROGRESS NOTES
Denies loss of fluid, vaginal bleeding and abdominal pain.  Confirms frequent fetal movement.  Tolerating prenatal vitamin well.  Had 28-week labs drawn this morning results are in process.  Reviewed recommendation for Tdap vaccine, agreeable to administration at today's visit.  Patient plans include breast-feeding-pump ordered, epidural for pain control during labor, [facility] for pediatrician and uncertain regarding postpartum contraception  BP: 130/60 weight: +30lb  Plan   -continue prenatal vitamins daily  -Fetal kick counts reviewed, encouraged daily and written information provided  -28-week folder provided and reviewed.  Breast pump ordered today  -Tdap vaccine today  -Follow-up with  center scheduled for 24  -Common discomforts of pregnancy and precautions including  labor reviewed.  Signs and symptoms to report reviewed.  RTO 2 weeks f/u labs

## 2024-01-26 LAB
DME PARACHUTE DELIVERY DATE ACTUAL: NORMAL
DME PARACHUTE DELIVERY DATE REQUESTED: NORMAL
DME PARACHUTE ITEM DESCRIPTION: NORMAL
DME PARACHUTE ORDER STATUS: NORMAL
DME PARACHUTE SUPPLIER NAME: NORMAL
DME PARACHUTE SUPPLIER PHONE: NORMAL

## 2024-02-01 ENCOUNTER — TELEPHONE (OUTPATIENT)
Dept: OBGYN CLINIC | Facility: MEDICAL CENTER | Age: 29
End: 2024-02-01

## 2024-02-02 ENCOUNTER — ROUTINE PRENATAL (OUTPATIENT)
Dept: OBGYN CLINIC | Facility: MEDICAL CENTER | Age: 29
End: 2024-02-02

## 2024-02-02 VITALS — DIASTOLIC BLOOD PRESSURE: 68 MMHG | WEIGHT: 215 LBS | BODY MASS INDEX: 38.09 KG/M2 | SYSTOLIC BLOOD PRESSURE: 124 MMHG

## 2024-02-02 DIAGNOSIS — D53.9 MACROCYTIC ANEMIA: ICD-10-CM

## 2024-02-02 DIAGNOSIS — D69.6 THROMBOCYTOPENIA AFFECTING PREGNANCY (HCC): Primary | ICD-10-CM

## 2024-02-02 DIAGNOSIS — O09.893 PRIOR PREGNANCY COMPLICATED BY PIH, ANTEPARTUM, THIRD TRIMESTER: ICD-10-CM

## 2024-02-02 DIAGNOSIS — O99.119 THROMBOCYTOPENIA AFFECTING PREGNANCY (HCC): Primary | ICD-10-CM

## 2024-02-02 DIAGNOSIS — Z3A.29 29 WEEKS GESTATION OF PREGNANCY: ICD-10-CM

## 2024-02-02 PROCEDURE — PNV: Performed by: STUDENT IN AN ORGANIZED HEALTH CARE EDUCATION/TRAINING PROGRAM

## 2024-02-02 NOTE — PROGRESS NOTES
Routine Prenatal Visit  [facility] [address], [place], [place]    Assessment/Plan:  Mela is a 28 y.o. year old  at 29w4d who presents for routine prenatal visit.     1. Thrombocytopenia affecting pregnancy (HCC)    2. Prior pregnancy complicated by PIH, antepartum, third trimester    3. Macrocytic anemia  -     CBC and differential; Future  -     Vitamin B12; Future    4. 29 weeks gestation of pregnancy          Subjective:     CC: Prenatal care    Mela Perez is a 28 y.o.  female who presents for routine prenatal care at 29w4d.  Pregnancy ROS: Denies leakage of fluid, pelvic pain, or vaginal bleeding.  Reports normal fetal movement.    The following portions of the patient's history were reviewed and updated as appropriate: allergies, current medications, past family history, past medical history, obstetric history, gynecologic history, past social history, past surgical history and problem list.      Objective:  /68   Wt 97.5 kg (215 lb)   LMP 07/10/2023   BMI 38.09 kg/m²   Pregravid Weight/BMI: 83.9 kg (185 lb) (BMI 32.78)  Current Weight: 97.5 kg (215 lb)   Total Weight Gain: 13.6 kg (30 lb)   Pre-Travis Vitals      Flowsheet Row Most Recent Value   Prenatal Assessment    Fetal Heart Rate 152   Movement Present   Prenatal Vitals    Blood Pressure 124/68   Weight - Scale 97.5 kg (215 lb)   Urine Albumin/Glucose    Dilation/Effacement/Station    Vaginal Drainage    Draining Fluid No   Edema    LLE Edema None   RLE Edema None   Facial Edema None             General: Well appearing, no distress  Respiratory: Unlabored breathing  Cardiovascular: Regular rate.  Abdomen: Soft, gravid, nontender  Fundal Height: Appropriate for gestational age.  Extremities: Warm and well perfused.  Non tender.

## 2024-02-13 PROBLEM — Z3A.31 31 WEEKS GESTATION OF PREGNANCY: Status: ACTIVE | Noted: 2023-10-07

## 2024-02-16 ENCOUNTER — ULTRASOUND (OUTPATIENT)
Dept: PERINATAL CARE | Facility: OTHER | Age: 29
End: 2024-02-16
Payer: COMMERCIAL

## 2024-02-16 VITALS
SYSTOLIC BLOOD PRESSURE: 98 MMHG | DIASTOLIC BLOOD PRESSURE: 60 MMHG | WEIGHT: 219 LBS | HEART RATE: 99 BPM | HEIGHT: 63 IN | BODY MASS INDEX: 38.8 KG/M2

## 2024-02-16 DIAGNOSIS — Z3A.31 31 WEEKS GESTATION OF PREGNANCY: ICD-10-CM

## 2024-02-16 DIAGNOSIS — O99.212 SEVERE OBESITY DUE TO EXCESS CALORIES AFFECTING PREGNANCY IN SECOND TRIMESTER (HCC): Primary | ICD-10-CM

## 2024-02-16 DIAGNOSIS — O36.63X0 EXCESSIVE FETAL GROWTH AFFECTING MANAGEMENT OF PREGNANCY IN THIRD TRIMESTER, SINGLE OR UNSPECIFIED FETUS: ICD-10-CM

## 2024-02-16 DIAGNOSIS — O09.893 PRIOR PREGNANCY COMPLICATED BY PIH, ANTEPARTUM, THIRD TRIMESTER: ICD-10-CM

## 2024-02-16 DIAGNOSIS — E66.01 SEVERE OBESITY DUE TO EXCESS CALORIES AFFECTING PREGNANCY IN SECOND TRIMESTER (HCC): Primary | ICD-10-CM

## 2024-02-16 DIAGNOSIS — Z36.89 ENCOUNTER FOR ULTRASOUND TO CHECK FETAL GROWTH: ICD-10-CM

## 2024-02-16 PROCEDURE — 76816 OB US FOLLOW-UP PER FETUS: CPT | Performed by: STUDENT IN AN ORGANIZED HEALTH CARE EDUCATION/TRAINING PROGRAM

## 2024-02-16 PROCEDURE — 99213 OFFICE O/P EST LOW 20 MIN: CPT | Performed by: STUDENT IN AN ORGANIZED HEALTH CARE EDUCATION/TRAINING PROGRAM

## 2024-02-16 NOTE — PROGRESS NOTES
"[facility]: Ms. Perez was seen today for fetal growth assessment ultrasound.  See ultrasound report under \"OB Procedures\" tab.      MDM:   I. Diagnoses/Problems addressed:  macrosomia  II.  Data: I reviewed 2 lab tests ordered by another provider.  III.  Risk of morbidity: Low    Please don't hesitate to contact our office with any concerns or questions.  -Marly Ramirez MD    "

## 2024-02-19 ENCOUNTER — ROUTINE PRENATAL (OUTPATIENT)
Dept: OBGYN CLINIC | Facility: MEDICAL CENTER | Age: 29
End: 2024-02-19

## 2024-02-19 VITALS — DIASTOLIC BLOOD PRESSURE: 70 MMHG | WEIGHT: 218.4 LBS | BODY MASS INDEX: 38.69 KG/M2 | SYSTOLIC BLOOD PRESSURE: 110 MMHG

## 2024-02-19 DIAGNOSIS — O99.013 ANEMIA DURING PREGNANCY IN THIRD TRIMESTER: ICD-10-CM

## 2024-02-19 DIAGNOSIS — O99.212 SEVERE OBESITY DUE TO EXCESS CALORIES AFFECTING PREGNANCY IN SECOND TRIMESTER (HCC): ICD-10-CM

## 2024-02-19 DIAGNOSIS — O09.299 CURRENT SINGLETON PREGNANCY WITH HISTORY OF CONGENITAL HEART DISEASE IN PRIOR CHILD, ANTEPARTUM: ICD-10-CM

## 2024-02-19 DIAGNOSIS — O99.119 THROMBOCYTOPENIA AFFECTING PREGNANCY (HCC): ICD-10-CM

## 2024-02-19 DIAGNOSIS — Z3A.32 32 WEEKS GESTATION OF PREGNANCY: ICD-10-CM

## 2024-02-19 DIAGNOSIS — E66.01 SEVERE OBESITY DUE TO EXCESS CALORIES AFFECTING PREGNANCY IN SECOND TRIMESTER (HCC): ICD-10-CM

## 2024-02-19 DIAGNOSIS — O09.893 PRIOR PREGNANCY COMPLICATED BY PIH, ANTEPARTUM, THIRD TRIMESTER: Primary | ICD-10-CM

## 2024-02-19 DIAGNOSIS — D69.6 THROMBOCYTOPENIA AFFECTING PREGNANCY (HCC): ICD-10-CM

## 2024-02-19 PROCEDURE — PNV: Performed by: NURSE PRACTITIONER

## 2024-02-19 RX ORDER — FERROUS SULFATE 324(65)MG
324 TABLET, DELAYED RELEASE (ENTERIC COATED) ORAL EVERY OTHER DAY
Qty: 90 TABLET | Refills: 1 | Status: SHIPPED | OUTPATIENT
Start: 2024-02-19

## 2024-02-19 NOTE — PROGRESS NOTES
Denies loss of fluid, vaginal bleeding and abdominal pain.  Confirms frequent fetal movement.  Doing fetal kick counts.  Tolerating prenatal vitamin and low-dose aspirin well.  Reviewed 28-week labs significant for anemia and thrombocytopenia.  Reviewed recommendation to start oral iron every other day on an empty stomach.  Iron ordered at today's visit.  Repeat labs CBC, B12 and ferritin level ordered.  Patient will have labs done 4 to 6 weeks after initiating oral therapy.  Denies other questions or concerns at today's visit   center ultrasound reviewed-24-transverse presentation, possible macrosomia, anterior placenta, ROCKY-WNL and EFW 2237g/4 pounds 15 ounces (93%).  Recommendation for follow-up in 4 weeks for growth.  BP: 110/70 weight: +33lbs  Plan   -Continue prenatal vitamins and low-dose aspirin daily  -Continue fetal kick counts daily  -Anemia in pregnancy.  Start oral iron every other day on an empty stomach with orange juice.  Has repeat CBC, ferritin and B12 ordered  -Thrombocytopenia in pregnancy.  Signs and symptoms to report reviewed.  Has repeat CBC ordered  -Follow-up with  center 3/21/24-possible macrosomia  -Common discomforts of pregnancy and precautions including  labor reviewed.  Signs and symptoms to report reviewed.  RTO 2 weeks

## 2024-02-24 PROBLEM — Z3A.34 34 WEEKS GESTATION OF PREGNANCY: Status: ACTIVE | Noted: 2023-10-07

## 2024-02-24 NOTE — ASSESSMENT & PLAN NOTE
Last growth - 2/16/24    MEASUREMENTS (* Included In Average GA)     AC    31.06 cm        35 weeks 0 days* (>99%)  BPD   7.87 cm        31 weeks 4 days* (41%)  HC    29.21 cm        32 weeks 1 day * (30%)  Femur     6.23 cm        32 weeks 2 days* (56%)  EFW Hadlock 4   2237 grams - 4 lbs 15 oz    (93%)      The Expected weight is 10-11 pounds was 4 15 at 31 weeks   Went over shoulder dystocia and that risk   The largest baby is 7.1 pounds

## 2024-02-24 NOTE — PROGRESS NOTES
Problem List Items Addressed This Visit          Hematopoietic and Hemostatic    Thrombocytopenia affecting pregnancy (HCC)       Other    Current duncan pregnancy with history of congenital heart disease in prior child, antepartum     Echo completed last scan reviewed - 12/15/23 normal          Prior pregnancy complicated by PIH, antepartum, third trimester    34 weeks gestation of pregnancy - Primary     - declined genetic testing   Level 2 reviewed -   Growth = 2/16/23  Next 3/21           Excessive fetal growth affecting management of mother in third trimester, antepartum     Last growth - 2/16/24    MEASUREMENTS (* Included In Average GA)     AC    31.06 cm        35 weeks 0 days* (>99%)  BPD   7.87 cm        31 weeks 4 days* (41%)  HC    29.21 cm        32 weeks 1 day * (30%)  Femur     6.23 cm        32 weeks 2 days* (56%)  EFW Hadlock 4   2237 grams - 4 lbs 15 oz    (93%)      The Expected weight is 10-11 pounds was 4 15 at 31 weeks   Went over shoulder dystocia and that risk   The largest baby is 7.1 pounds

## 2024-03-01 ENCOUNTER — ROUTINE PRENATAL (OUTPATIENT)
Dept: OBGYN CLINIC | Facility: MEDICAL CENTER | Age: 29
End: 2024-03-01

## 2024-03-01 VITALS — BODY MASS INDEX: 38.32 KG/M2 | SYSTOLIC BLOOD PRESSURE: 110 MMHG | DIASTOLIC BLOOD PRESSURE: 65 MMHG | WEIGHT: 216.3 LBS

## 2024-03-01 DIAGNOSIS — O09.893 PRIOR PREGNANCY COMPLICATED BY PIH, ANTEPARTUM, THIRD TRIMESTER: ICD-10-CM

## 2024-03-01 DIAGNOSIS — O36.63X0 EXCESSIVE FETAL GROWTH AFFECTING MANAGEMENT OF PREGNANCY IN THIRD TRIMESTER, SINGLE OR UNSPECIFIED FETUS: ICD-10-CM

## 2024-03-01 DIAGNOSIS — D69.6 THROMBOCYTOPENIA AFFECTING PREGNANCY (HCC): ICD-10-CM

## 2024-03-01 DIAGNOSIS — O99.119 THROMBOCYTOPENIA AFFECTING PREGNANCY (HCC): ICD-10-CM

## 2024-03-01 DIAGNOSIS — Z3A.34 34 WEEKS GESTATION OF PREGNANCY: Primary | ICD-10-CM

## 2024-03-01 DIAGNOSIS — O09.299 CURRENT SINGLETON PREGNANCY WITH HISTORY OF CONGENITAL HEART DISEASE IN PRIOR CHILD, ANTEPARTUM: ICD-10-CM

## 2024-03-01 PROCEDURE — PNV: Performed by: OBSTETRICS & GYNECOLOGY

## 2024-03-07 ENCOUNTER — HOSPITAL ENCOUNTER (OUTPATIENT)
Facility: HOSPITAL | Age: 29
Discharge: HOME | End: 2024-03-07
Attending: OBSTETRICS & GYNECOLOGY | Admitting: OBSTETRICS & GYNECOLOGY
Payer: COMMERCIAL

## 2024-03-07 VITALS
TEMPERATURE: 98.4 F | HEART RATE: 94 BPM | DIASTOLIC BLOOD PRESSURE: 64 MMHG | SYSTOLIC BLOOD PRESSURE: 109 MMHG | OXYGEN SATURATION: 95 %

## 2024-03-07 PROBLEM — O47.9 UTERINE CONTRACTIONS: Status: ACTIVE | Noted: 2024-03-07

## 2024-03-07 PROCEDURE — 99202 OFFICE O/P NEW SF 15 MIN: CPT

## 2024-03-07 PROCEDURE — NC001 PR NO CHARGE: Performed by: OBSTETRICS & GYNECOLOGY

## 2024-03-07 NOTE — PROGRESS NOTES
L&D Triage Note - OB/GYN  Mela Perez 28 y.o. female MRN: [id number]  Unit/Bed#:  TRIAGE 3 Encounter: [id number]      ASSESSMENT/PLAN  Mela Perez is a 28 y.o.  at 34w3d here for two hours of aracely gastelum contractions at work      1) R/O  labor  -SVE closed/thick/high   -Patient reports Aracely Gastelum contractions are no longer occurring  -No contractions on toco    2)  Discharge instructions  - Patient instructed to call if experiencing worsening contractions, vaginal bleeding, loss of fluid or decreased fetal movement.  - Will follow up with OBGYN in office      She is a patient of [facility]  D/w Dr. Malik, on call OBGYN Attending Physician  ______________    SUBJECTIVE    JORDAN: Estimated Date of Delivery: 4/15/24    HPI:  28 y.o.  34w3d presents with complaint of 2 hours of [place] Gastelum contractions at work.  She reports she is not sure how frequent they were coming.  She is no longer feeling the pain.  She has no other obstetric complaints currently.     Contractions: not currently  Leakage of fluid: no  Vaginal Bleeding: no  Fetal movement: present    Her obstetrical history is significant for obesity, thrombocytopenia of pregnancy, concern for fetal macrosomia    ROS:  Constitutional: Negative  Respiratory: Negative  Cardiovascular: Negative    Gastrointestinal: Negative    Physical Exam  General: Well appearing, no distress  Respiratory: Unlabored breathing  Cardiovascular: Regular rate  Abdomen: Soft, gravid, nontender   Fundal Height: Appropriate for gestational age.  Extremities: Warm and well perfused.  Non tender.      OBJECTIVE:  /64   Pulse 94   Temp 98.4 °F (36.9 °C) (Oral)   LMP 07/10/2023   SpO2 95%   There is no height or weight on file to calculate BMI.  Labs: No results found for this or any previous visit (from the past 24 hour(s)).      SVE:  Cervical Dilation: Closed  Cervical Effacement: 0  Cervical Consistency: Firm  Fetal  "Station: Floating  Presentation: Vertex  Position: Unknown  Method: Manual  OB Examiner: Jack    FHT:   Reactive NST    TOCO:    Occasional contractions patient isn't feeling, uterine irritability      Ann Santiago, DO  OB/GYN PGY-1  3/7/2024  12:28 PM      Portions of the record may have been created with voice recognition software.  Occasional wrong word or \"sound a like\" substitutions may have occurred due to the inherent limitations of voice recognition software.  Read the chart carefully and recognize, using context, where substitutions have occurred   "

## 2024-03-15 ENCOUNTER — ROUTINE PRENATAL (OUTPATIENT)
Dept: OBGYN CLINIC | Facility: MEDICAL CENTER | Age: 29
End: 2024-03-15

## 2024-03-15 VITALS — WEIGHT: 221.7 LBS | BODY MASS INDEX: 39.27 KG/M2 | SYSTOLIC BLOOD PRESSURE: 104 MMHG | DIASTOLIC BLOOD PRESSURE: 60 MMHG

## 2024-03-15 DIAGNOSIS — Z34.83 ENCOUNTER FOR SUPERVISION OF OTHER NORMAL PREGNANCY IN THIRD TRIMESTER: Primary | ICD-10-CM

## 2024-03-15 DIAGNOSIS — Z3A.35 35 WEEKS GESTATION OF PREGNANCY: ICD-10-CM

## 2024-03-15 PROCEDURE — PNV: Performed by: OBSTETRICS & GYNECOLOGY

## 2024-03-15 PROCEDURE — 87150 DNA/RNA AMPLIFIED PROBE: CPT | Performed by: OBSTETRICS & GYNECOLOGY

## 2024-03-15 NOTE — PATIENT INSTRUCTIONS
Fetal Movement   WHAT YOU NEED TO KNOW:   Fetal movements are the kicks, rolls, and hiccups of your unborn baby. You may start to feel these movements when you are 20 weeks pregnant. The movements grow stronger and more frequent as your baby grows. Fetal movements show that your unborn baby is getting the oxygen and nutrients he or she needs before birth. Fewer fetal movements may signal a problem with your baby's health.  DISCHARGE INSTRUCTIONS:   Follow up with your doctor or obstetrician as directed:  Write down your questions so you remember to ask them during your visits.  Normal fetal movement:  Fetal activity can be described by 4 states, from least to most active. During quiet sleep, your unborn baby may be still for up to 2 hours. During active sleep, he or she kicks, rolls, and moves often. During the quiet awake state, he or she may only move his or her eyes. The active awake state includes strong kicks and rolls.  What affects fetal movement:  You may feel your baby move more after you eat, or after you drink caffeine. You may feel your baby move less while you are more active, such as when you exercise. You may also feel fewer movements if you are obese. Certain medicines can change your baby's movements. Tell your healthcare provider about the medicines you are taking.  Track fetal movements at home:  Fetal movement is most often felt when you lie quietly on your side. Your healthcare provider may ask you to count movements for 2 hours. He or she may ask you to track how long it takes for your baby to move 10 times. Keep a log of your baby's movements.  Contact your doctor or obstetrician if:   It takes longer than usual to feel 10 of your unborn baby's movements.    You do not feel your unborn baby move at least 10 times in 2 hours.    The skin on your hands, feet, and around your eyes is more swollen than usual.    You have a headache for at least 24 hours.    Tiny red dots appear on your  skin.    Your belly is tender when you press on it.    You have questions or concerns about your condition or care.    Return to the emergency department if:   You do not feel your unborn baby move for 12 hours.    You feel cramping or constant pain in your abdomen.    You have heavy bleeding from your vagina.    You have a severe headache and cannot see clearly.    You are having trouble breathing or are vomiting.    You have a seizure.    © Copyright [employer] 2023 Information is for End User's use only and may not be sold, redistributed or otherwise used for commercial purposes.  The above information is an  only. It is not intended as medical advice for individual conditions or treatments. Talk to your doctor, nurse or pharmacist before following any medical regimen to see if it is safe and effective for you.  Early Labor Signs   WHAT YOU NEED TO KNOW:   Early labor signs can happen weeks, days, or hours before your baby is ready to be delivered. You may have false labor signs, which are also called Rahul Rodriguez contractions. False labor is common and may happen several weeks or days before your actual labor. The contractions are not regular, and do not get closer together. The pain is usually mild, does not worsen, and is felt only in front. Rahul Rodriguez contractions may happen later in the day, and stop after you change position, walk, or rest.  DISCHARGE INSTRUCTIONS:   Call 911 for any of the following:   You have heavy vaginal bleeding.    You cannot get to the hospital before the baby starts to come out.    Return to the emergency department if:   You have regular, painful contractions that are less than 5 minutes apart and last 30 to 70 seconds each.    You have a constant trickle or sudden gush of clear fluid from your vagina.    You notice a sudden decrease in your baby's movement.    Contact your obstetrician or healthcare provider if:   You have pain in your lower back or abdomen that  does not get better when you change positions.    You have bloody mucus or show.    You have questions or concerns about your condition or care.    Early Labor signs and symptoms:   Lightening  occurs when your baby drops inside your pelvis. You may feel increased pressure in your pelvis. This may happen a few weeks to a few hours before your labor begins.    Contractions  are cramps and tightening that occur in your uterus to help move the baby through your birth canal. Contractions occur regularly and more often each time. Each one lasts about 30 to 70 seconds, and gets stronger until you deliver your baby. Contractions do not go away with movement. The pain usually starts in your lower back and moves to your abdomen.     Effacement  occurs when your cervix softens and thins, so it can easily open for the baby. You will not be able to feel effacement. Your healthcare provider will examine your cervix for effacement.     Dilation  is widening of your cervix. Your healthcare provider will examine your cervix for dilation. Your cervix may start to dilate weeks before your baby is delivered. Your cervix will be fully opened and ready for delivery when it is dilated to 10 centimeters.     Increased discharge  from your vagina may occur. It may be brown, pink, clear, or slightly bloody. This discharge may also be called bloody show. Bloody show is a mucus plug that forms and blocks your cervix during pregnancy. The discharge may mean that your cervix is opening up and getting ready for delivery.    Rupture of membranes  is a sudden release of clear fluid from your vagina. Ruptured membranes means your water broke. Your healthcare provider may need to break your water if it does not happen on its own.    © Copyright [employer]ative 2023 Information is for End User's use only and may not be sold, redistributed or otherwise used for commercial purposes.  The above information is an  only. It is not intended as  medical advice for individual conditions or treatments. Talk to your doctor, nurse or pharmacist before following any medical regimen to see if it is safe and effective for you.

## 2024-03-15 NOTE — PROGRESS NOTES
Routine Prenatal Visit  [facility] [address], [place], [place]    Assessment/Plan:  Mela is a 28 y.o. year old  at 35w4d who presents for routine prenatal visit.     1. Encounter for supervision of other normal pregnancy in third trimester    2. 35 weeks gestation of pregnancy  -     Strep B DNA probe, amplification          Subjective:     CC: Prenatal care    Mela Perez is a 28 y.o.  female who presents for routine prenatal care at 35w4d.  Pregnancy ROS: no leakage of fluid, pelvic pain, or vaginal bleeding.  good fetal movement.  Reports aracely gastelum contractions  CVX /high  GBS done today    The following portions of the patient's history were reviewed and updated as appropriate: allergies, current medications, past family history, past medical history, obstetric history, gynecologic history, past social history, past surgical history and problem list.      Objective:  /60   Wt 101 kg (221 lb 11.2 oz)   LMP 07/10/2023   BMI 39.27 kg/m²   Pregravid Weight/BMI: 83.9 kg (185 lb) (BMI 32.78)  Current Weight: 101 kg (221 lb 11.2 oz)   Total Weight Gain: 16.6 kg (36 lb 11.2 oz)   Pre- Vitals      Flowsheet Row Most Recent Value   Prenatal Assessment    Fetal Heart Rate 149   Movement Present   Prenatal Vitals    Blood Pressure 104/60   Weight - Scale 101 kg (221 lb 11.2 oz)   Urine Albumin/Glucose    Dilation/Effacement/Station    Vaginal Drainage    Draining Fluid No   Edema    LLE Edema None             General: Well appearing, no distress  Respiratory: Unlabored breathing  Cardiovascular: Regular rate.  Abdomen: Soft, gravid, nontender  Fundal Height: Appropriate for gestational age.  Extremities: Warm and well perfused.  Non tender.

## 2024-03-18 LAB — GP B STREP DNA SPEC QL NAA+PROBE: NEGATIVE

## 2024-03-20 PROBLEM — Z3A.36 36 WEEKS GESTATION OF PREGNANCY: Status: ACTIVE | Noted: 2023-10-07

## 2024-03-21 ENCOUNTER — ULTRASOUND (OUTPATIENT)
Dept: PERINATAL CARE | Facility: OTHER | Age: 29
End: 2024-03-21
Payer: COMMERCIAL

## 2024-03-21 VITALS
HEIGHT: 63 IN | BODY MASS INDEX: 39.8 KG/M2 | WEIGHT: 224.6 LBS | DIASTOLIC BLOOD PRESSURE: 70 MMHG | HEART RATE: 89 BPM | SYSTOLIC BLOOD PRESSURE: 109 MMHG

## 2024-03-21 DIAGNOSIS — O36.63X0 EXCESSIVE FETAL GROWTH AFFECTING MANAGEMENT OF PREGNANCY IN THIRD TRIMESTER, SINGLE OR UNSPECIFIED FETUS: Primary | ICD-10-CM

## 2024-03-21 DIAGNOSIS — Z36.89 ENCOUNTER FOR ULTRASOUND TO ASSESS FETAL GROWTH: ICD-10-CM

## 2024-03-21 DIAGNOSIS — E66.01 SEVERE OBESITY DUE TO EXCESS CALORIES AFFECTING PREGNANCY IN SECOND TRIMESTER (HCC): ICD-10-CM

## 2024-03-21 DIAGNOSIS — O99.212 SEVERE OBESITY DUE TO EXCESS CALORIES AFFECTING PREGNANCY IN SECOND TRIMESTER (HCC): ICD-10-CM

## 2024-03-21 DIAGNOSIS — O99.119 THROMBOCYTOPENIA AFFECTING PREGNANCY (HCC): ICD-10-CM

## 2024-03-21 DIAGNOSIS — O09.893 PRIOR PREGNANCY COMPLICATED BY PIH, ANTEPARTUM, THIRD TRIMESTER: ICD-10-CM

## 2024-03-21 DIAGNOSIS — Z3A.36 36 WEEKS GESTATION OF PREGNANCY: ICD-10-CM

## 2024-03-21 DIAGNOSIS — D69.6 THROMBOCYTOPENIA AFFECTING PREGNANCY (HCC): ICD-10-CM

## 2024-03-21 PROBLEM — O47.9 UTERINE CONTRACTIONS: Status: RESOLVED | Noted: 2024-03-07 | Resolved: 2024-03-21

## 2024-03-21 PROCEDURE — 99214 OFFICE O/P EST MOD 30 MIN: CPT | Performed by: OBSTETRICS & GYNECOLOGY

## 2024-03-21 PROCEDURE — 76816 OB US FOLLOW-UP PER FETUS: CPT | Performed by: OBSTETRICS & GYNECOLOGY

## 2024-03-21 NOTE — PROGRESS NOTES
"[facility]: Mela Perez was seen today at 36w3d for fetal growth assessment ultrasound.  See ultrasound report under \"OB Procedures\" tab.  Please don't hesitate to contact our office with any concerns or questions.  -Makeda Krishna MD    "

## 2024-03-21 NOTE — LETTER
"2024     Brandy Eugene MD  [address] [place] [address]  [address] [place] [address]    Patient: Mela Perez   YOB: 1995   Date of Visit: 3/21/2024       Dear Dr. Eugene:    You are seeing her tomorrow for a prenatal visit. Please note breech presentation, currently 36w3d. We discussed management options, she was interested in ECV. I asked her to discuss with you tomorrow further at her prenatal visit.     Sincerely,        Makeda Krishna MD        CC: No Recipients    Makeda Krishna MD  3/21/2024  1:13 PM  Sign when Signing Visit  [facility]: Mela Perez was seen today at 36w3d for fetal growth assessment ultrasound.  See ultrasound report under \"OB Procedures\" tab.  Please don't hesitate to contact our office with any concerns or questions.  -Makeda Krishna MD    "

## 2024-03-22 ENCOUNTER — ROUTINE PRENATAL (OUTPATIENT)
Dept: OBGYN CLINIC | Facility: MEDICAL CENTER | Age: 29
End: 2024-03-22

## 2024-03-22 VITALS — SYSTOLIC BLOOD PRESSURE: 105 MMHG | DIASTOLIC BLOOD PRESSURE: 68 MMHG | BODY MASS INDEX: 39.48 KG/M2 | WEIGHT: 222.9 LBS

## 2024-03-22 DIAGNOSIS — O09.299 CURRENT SINGLETON PREGNANCY WITH HISTORY OF CONGENITAL HEART DISEASE IN PRIOR CHILD, ANTEPARTUM: ICD-10-CM

## 2024-03-22 DIAGNOSIS — D69.6 THROMBOCYTOPENIA AFFECTING PREGNANCY (HCC): ICD-10-CM

## 2024-03-22 DIAGNOSIS — Z3A.36 36 WEEKS GESTATION OF PREGNANCY: ICD-10-CM

## 2024-03-22 DIAGNOSIS — O99.119 THROMBOCYTOPENIA AFFECTING PREGNANCY (HCC): ICD-10-CM

## 2024-03-22 PROCEDURE — PNV: Performed by: STUDENT IN AN ORGANIZED HEALTH CARE EDUCATION/TRAINING PROGRAM

## 2024-03-22 NOTE — LETTER
March 22, 2024     Patient: Mela Perez  YOB: 1995  Date of Visit: 3/22/2024      To Whom it May Concern:    Mela Perez is under my professional care. Mela is due on 04/15/2024.  Please excuse her  Miguel Perez from work for paternity leave.    If you have any questions or concerns, please don't hesitate to call.         Sincerely,          Brandy Eugene MD        CC: No Recipients

## 2024-03-22 NOTE — PROGRESS NOTES
Routine Prenatal Visit  [facility] [address], [place], [place]    Assessment/Plan:  Mela is a 28 y.o. year old  at 36w4d who presents for routine prenatal visit.     1. Breech presentation, single or unspecified fetus  Assessment & Plan:  - Discussed management options for persistent breech presentation, including planned  delivery, external cephalic version, and vaginal breech delivery.  Discussed risks and benefits of each option.  - Discussed with patient the method and success rates with external cephalic version.  - Discussed that version is made more difficult with obese women, large babies, anterior placentas, and breech engaged in the pelvis.   - Discussed that version carries a risk of fetal intolerance and urgent  delivery during the procedure.   - The patient does not have contraindications to planned ECV. Pros and cons of regional anesthesia use were reviewed.   - Plan: Patient is currently undecided, will call office on Monday.  Please reach out to Dr. Eugene with an update via TT.        2. Thrombocytopenia affecting pregnancy (HCC)    3. 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 36 weeks gestation of pregnancy          Subjective:     CC: Prenatal care    Mela Perez is a 28 y.o.  female who presents for routine prenatal care at 36w4d.  Pregnancy ROS: Denies leakage of fluid, pelvic pain, or vaginal bleeding.  Reports normal fetal movement.    The following portions of the patient's history were reviewed and updated as appropriate: allergies, current medications, past family history, past medical history, obstetric history, gynecologic history, past social history, past surgical history and problem list.      Objective:  /68   Wt 101 kg (222 lb 14.4 oz)   LMP 07/10/2023   BMI 39.48 kg/m²   Pregravid Weight/BMI: 83.9 kg (185 lb) (BMI 32.78)  Current  Weight: 101 kg (222 lb 14.4 oz)   Total Weight Gain: 17.2 kg (37 lb 14.4 oz)   Pre- Vitals      Flowsheet Row Most Recent Value   Prenatal Assessment    Fetal Heart Rate 143   Movement Present   Presentation Breech   Prenatal Vitals    Blood Pressure 105/68   Weight - Scale 101 kg (222 lb 14.4 oz)   Urine Albumin/Glucose    Dilation/Effacement/Station    Vaginal Drainage    Draining Fluid No   Edema    LLE Edema None   RLE Edema None   Facial Edema None             General: Well appearing, no distress  Respiratory: Unlabored breathing  Cardiovascular: Regular rate.  Abdomen: Soft, gravid, nontender  Fundal Height: Appropriate for gestational age.  Extremities: Warm and well perfused.  Non tender.

## 2024-03-22 NOTE — ASSESSMENT & PLAN NOTE
- Discussed management options for persistent breech presentation, including planned  delivery, external cephalic version, and vaginal breech delivery.  Discussed risks and benefits of each option.  - Discussed with patient the method and success rates with external cephalic version.  - Discussed that version is made more difficult with obese women, large babies, anterior placentas, and breech engaged in the pelvis.   - Discussed that version carries a risk of fetal intolerance and urgent  delivery during the procedure.   - The patient does not have contraindications to planned ECV. Pros and cons of regional anesthesia use were reviewed.   - Plan: Patient is currently undecided, will call office on Monday.  Please reach out to Dr. Eugene with an update via TT.

## 2024-03-25 ENCOUNTER — TELEPHONE (OUTPATIENT)
Dept: OBGYN CLINIC | Facility: MEDICAL CENTER | Age: 29
End: 2024-03-25

## 2024-03-25 ENCOUNTER — TELEPHONE (OUTPATIENT)
Age: 29
End: 2024-03-25

## 2024-03-25 NOTE — TELEPHONE ENCOUNTER
Returned patient's call regarding scheduling her . Patient has decided against ECV at this time. Patient scheduled for 1LTCS 4/10 at 12PM. Both patient and surgeon aware.

## 2024-03-29 ENCOUNTER — ROUTINE PRENATAL (OUTPATIENT)
Dept: OBGYN CLINIC | Facility: MEDICAL CENTER | Age: 29
End: 2024-03-29

## 2024-03-29 VITALS — SYSTOLIC BLOOD PRESSURE: 128 MMHG | DIASTOLIC BLOOD PRESSURE: 70 MMHG | BODY MASS INDEX: 39.5 KG/M2 | WEIGHT: 223 LBS

## 2024-03-29 DIAGNOSIS — Z3A.37 37 WEEKS GESTATION OF PREGNANCY: ICD-10-CM

## 2024-03-29 DIAGNOSIS — O09.893 PRIOR PREGNANCY COMPLICATED BY PIH, ANTEPARTUM, THIRD TRIMESTER: ICD-10-CM

## 2024-03-29 DIAGNOSIS — D69.6 THROMBOCYTOPENIA AFFECTING PREGNANCY (HCC): Primary | ICD-10-CM

## 2024-03-29 DIAGNOSIS — O99.119 THROMBOCYTOPENIA AFFECTING PREGNANCY (HCC): Primary | ICD-10-CM

## 2024-03-29 PROCEDURE — PNV: Performed by: STUDENT IN AN ORGANIZED HEALTH CARE EDUCATION/TRAINING PROGRAM

## 2024-03-29 NOTE — PROGRESS NOTES
Routine Prenatal Visit  [facility] [address], [place], [place]    Assessment/Plan:  Mela is a 28 y.o. year old  at 37w4d who presents for routine prenatal visit.     1. Thrombocytopenia affecting pregnancy (HCC)    2. Breech presentation, single or unspecified fetus    3. Prior pregnancy complicated by PIH, antepartum, third trimester    4. 37 weeks gestation of pregnancy  Assessment & Plan:  - Patient declines ECV, CS is planned; gave preop instructions, counseled in detail            Subjective:     CC: Prenatal care    Mela Perez is a 28 y.o.  female who presents for routine prenatal care at 37w4d.  Pregnancy ROS: Denies leakage of fluid, pelvic pain, or vaginal bleeding.  Reports fetal movement.    The following portions of the patient's history were reviewed and updated as appropriate: allergies, current medications, past family history, past medical history, obstetric history, gynecologic history, past social history, past surgical history and problem list.      Objective:  /70   Wt 101 kg (223 lb)   LMP 07/10/2023   BMI 39.50 kg/m²   Pregravid Weight/BMI: 83.9 kg (185 lb) (BMI 32.78)  Current Weight: 101 kg (223 lb)   Total Weight Gain: 17.2 kg (38 lb)   Pre-Travis Vitals      Flowsheet Row Most Recent Value   Prenatal Assessment    Fetal Heart Rate 142   Movement Present   Prenatal Vitals    Blood Pressure 128/70   Weight - Scale 101 kg (223 lb)   Urine Albumin/Glucose    Dilation/Effacement/Station    Vaginal Drainage    Draining Fluid No   Edema    LLE Edema None   RLE Edema None   Facial Edema None             General: Well appearing, no distress  Respiratory: Unlabored breathing  Cardiovascular: Regular rate.  Abdomen: Soft, gravid, nontender  Fundal Height: Appropriate for gestational age.  Extremities: Warm and well perfused.  Non tender.

## 2024-03-29 NOTE — LETTER
March 29, 2024     Patient: Mela Perez  YOB: 1995  Date of Visit: 3/29/2024      To Whom it May Concern:    Mela Perez is under my professional care. Mela was seen in my office on 3/29/2024. Mela is having a planned delivery on 4/10/2024 and is starting her maternity leave on 4/8/2024.  Please excuse Miguel from work on this date as well.    If you have any questions or concerns, please don't hesitate to call.         Sincerely,          Brandy Eugene MD        CC: No Recipients  
fall

## 2024-04-04 ENCOUNTER — HOSPITAL ENCOUNTER (INPATIENT)
Facility: HOSPITAL | Age: 29
LOS: 2 days | Discharge: HOME | End: 2024-04-06
Attending: OBSTETRICS & GYNECOLOGY | Admitting: OBSTETRICS & GYNECOLOGY
Payer: COMMERCIAL

## 2024-04-04 ENCOUNTER — NURSE TRIAGE (OUTPATIENT)
Age: 29
End: 2024-04-04

## 2024-04-04 ENCOUNTER — ANESTHESIA EVENT (INPATIENT)
Dept: LABOR AND DELIVERY | Facility: HOSPITAL | Age: 29
End: 2024-04-04
Payer: COMMERCIAL

## 2024-04-04 ENCOUNTER — ANESTHESIA (INPATIENT)
Dept: LABOR AND DELIVERY | Facility: HOSPITAL | Age: 29
End: 2024-04-04
Payer: COMMERCIAL

## 2024-04-04 DIAGNOSIS — Z3A.38 38 WEEKS GESTATION OF PREGNANCY: Primary | ICD-10-CM

## 2024-04-04 DIAGNOSIS — Z98.891 STATUS POST PRIMARY LOW TRANSVERSE CESAREAN SECTION: ICD-10-CM

## 2024-04-04 PROBLEM — Z30.2 STERILIZATION: Status: ACTIVE | Noted: 2024-04-04

## 2024-04-04 LAB
ABO GROUP BLD: NORMAL
BASE EXCESS BLDCOA CALC-SCNC: -4.4 MMOL/L (ref 3–11)
BASE EXCESS BLDCOV CALC-SCNC: -3.7 MMOL/L (ref 1–9)
BLD GP AB SCN SERPL QL: NEGATIVE
ERYTHROCYTE [DISTWIDTH] IN BLOOD BY AUTOMATED COUNT: 13.7 % (ref 11.6–15.1)
HCO3 BLDCOA-SCNC: 22.7 MMOL/L (ref 17.3–27.3)
HCO3 BLDCOV-SCNC: 21.7 MMOL/L (ref 12.2–28.6)
HCT VFR BLD AUTO: 33.8 % (ref 34.8–46.1)
HGB BLD-MCNC: 11.5 G/DL (ref 11.5–15.4)
HOLD SPECIMEN: YES
MCH RBC QN AUTO: 29.8 PG (ref 26.8–34.3)
MCHC RBC AUTO-ENTMCNC: 34 G/DL (ref 31.4–37.4)
MCV RBC AUTO: 88 FL (ref 82–98)
O2 CT VFR BLDCOA CALC: 3.9 ML/DL
OXYHGB MFR BLDCOA: 18.1 %
OXYHGB MFR BLDCOV: 39.1 %
PCO2 BLDCOA: 49.3 MM[HG] (ref 30–60)
PCO2 BLDCOV: 40.9 MM HG (ref 27–43)
PH BLDCOA: 7.28 [PH] (ref 7.23–7.43)
PH BLDCOV: 7.34 [PH] (ref 7.19–7.49)
PLATELET # BLD AUTO: 108 THOUSANDS/UL (ref 149–390)
PMV BLD AUTO: 9.8 FL (ref 8.9–12.7)
PO2 BLDCOA: 12.4 MM HG (ref 5–25)
PO2 BLDCOV: 17.7 MM HG (ref 15–45)
RBC # BLD AUTO: 3.86 MILLION/UL (ref 3.81–5.12)
RH BLD: POSITIVE
SAO2 % BLDCOV: 8.8 ML/DL
SPECIMEN EXPIRATION DATE: NORMAL
WBC # BLD AUTO: 10.11 THOUSAND/UL (ref 4.31–10.16)

## 2024-04-04 PROCEDURE — 86780 TREPONEMA PALLIDUM: CPT | Performed by: OBSTETRICS & GYNECOLOGY

## 2024-04-04 PROCEDURE — 86901 BLOOD TYPING SEROLOGIC RH(D): CPT | Performed by: OBSTETRICS & GYNECOLOGY

## 2024-04-04 PROCEDURE — 82805 BLOOD GASES W/O2 SATURATION: CPT | Performed by: OBSTETRICS & GYNECOLOGY

## 2024-04-04 PROCEDURE — 4A1HXCZ MONITORING OF PRODUCTS OF CONCEPTION, CARDIAC RATE, EXTERNAL APPROACH: ICD-10-PCS | Performed by: OBSTETRICS & GYNECOLOGY

## 2024-04-04 PROCEDURE — 99212 OFFICE O/P EST SF 10 MIN: CPT

## 2024-04-04 PROCEDURE — 3E0R3BZ INTRODUCTION OF ANESTHETIC AGENT INTO SPINAL CANAL, PERCUTANEOUS APPROACH: ICD-10-PCS | Performed by: ANESTHESIOLOGY

## 2024-04-04 PROCEDURE — 86900 BLOOD TYPING SEROLOGIC ABO: CPT | Performed by: OBSTETRICS & GYNECOLOGY

## 2024-04-04 PROCEDURE — 88302 TISSUE EXAM BY PATHOLOGIST: CPT | Performed by: PATHOLOGY

## 2024-04-04 PROCEDURE — 85027 COMPLETE CBC AUTOMATED: CPT | Performed by: OBSTETRICS & GYNECOLOGY

## 2024-04-04 PROCEDURE — 86850 RBC ANTIBODY SCREEN: CPT | Performed by: OBSTETRICS & GYNECOLOGY

## 2024-04-04 PROCEDURE — 0UB70ZZ EXCISION OF BILATERAL FALLOPIAN TUBES, OPEN APPROACH: ICD-10-PCS | Performed by: OBSTETRICS & GYNECOLOGY

## 2024-04-04 RX ORDER — MORPHINE SULFATE 0.5 MG/ML
INJECTION, SOLUTION EPIDURAL; INTRATHECAL; INTRAVENOUS AS NEEDED
Status: DISCONTINUED | OUTPATIENT
Start: 2024-04-04 | End: 2024-04-04

## 2024-04-04 RX ORDER — ONDANSETRON 2 MG/ML
INJECTION INTRAMUSCULAR; INTRAVENOUS
Status: COMPLETED
Start: 2024-04-04 | End: 2024-04-04

## 2024-04-04 RX ORDER — KETOROLAC TROMETHAMINE 30 MG/ML
INJECTION, SOLUTION INTRAMUSCULAR; INTRAVENOUS AS NEEDED
Status: DISCONTINUED | OUTPATIENT
Start: 2024-04-04 | End: 2024-04-04

## 2024-04-04 RX ORDER — ONDANSETRON 2 MG/ML
INJECTION INTRAMUSCULAR; INTRAVENOUS
Status: DISCONTINUED
Start: 2024-04-04 | End: 2024-04-04 | Stop reason: WASHOUT

## 2024-04-04 RX ORDER — FENTANYL CITRATE 50 UG/ML
INJECTION, SOLUTION INTRAMUSCULAR; INTRAVENOUS
Status: COMPLETED
Start: 2024-04-04 | End: 2024-04-04

## 2024-04-04 RX ORDER — MORPHINE SULFATE 0.5 MG/ML
INJECTION, SOLUTION EPIDURAL; INTRATHECAL; INTRAVENOUS
Status: COMPLETED
Start: 2024-04-04 | End: 2024-04-04

## 2024-04-04 RX ORDER — ONDANSETRON 2 MG/ML
4 INJECTION INTRAMUSCULAR; INTRAVENOUS EVERY 8 HOURS PRN
Status: DISCONTINUED | OUTPATIENT
Start: 2024-04-04 | End: 2024-04-06 | Stop reason: HOSPADM

## 2024-04-04 RX ORDER — CEFAZOLIN SODIUM 2 G/50ML
2000 SOLUTION INTRAVENOUS ONCE
Status: COMPLETED | OUTPATIENT
Start: 2024-04-04 | End: 2024-04-04

## 2024-04-04 RX ORDER — KETOROLAC TROMETHAMINE 30 MG/ML
30 INJECTION, SOLUTION INTRAMUSCULAR; INTRAVENOUS EVERY 6 HOURS PRN
Status: DISPENSED | OUTPATIENT
Start: 2024-04-04 | End: 2024-04-05

## 2024-04-04 RX ORDER — ONDANSETRON 2 MG/ML
4 INJECTION INTRAMUSCULAR; INTRAVENOUS ONCE AS NEEDED
Status: DISCONTINUED | OUTPATIENT
Start: 2024-04-04 | End: 2024-04-05

## 2024-04-04 RX ORDER — CALCIUM CARBONATE 500 MG/1
1000 TABLET, CHEWABLE ORAL DAILY PRN
Status: DISCONTINUED | OUTPATIENT
Start: 2024-04-04 | End: 2024-04-06 | Stop reason: HOSPADM

## 2024-04-04 RX ORDER — OXYTOCIN/RINGER'S LACTATE 30/500 ML
PLASTIC BAG, INJECTION (ML) INTRAVENOUS
Status: DISPENSED
Start: 2024-04-04 | End: 2024-04-05

## 2024-04-04 RX ORDER — BUPIVACAINE HYDROCHLORIDE 7.5 MG/ML
INJECTION, SOLUTION INTRASPINAL AS NEEDED
Status: DISCONTINUED | OUTPATIENT
Start: 2024-04-04 | End: 2024-04-04

## 2024-04-04 RX ORDER — DOCUSATE SODIUM 100 MG/1
100 CAPSULE, LIQUID FILLED ORAL 2 TIMES DAILY
Status: DISCONTINUED | OUTPATIENT
Start: 2024-04-04 | End: 2024-04-06 | Stop reason: HOSPADM

## 2024-04-04 RX ORDER — OXYTOCIN/RINGER'S LACTATE 30/500 ML
PLASTIC BAG, INJECTION (ML) INTRAVENOUS CONTINUOUS PRN
Status: DISCONTINUED | OUTPATIENT
Start: 2024-04-04 | End: 2024-04-04

## 2024-04-04 RX ORDER — OXYCODONE HYDROCHLORIDE 5 MG/1
10 TABLET ORAL EVERY 4 HOURS PRN
Status: DISCONTINUED | OUTPATIENT
Start: 2024-04-05 | End: 2024-04-06 | Stop reason: HOSPADM

## 2024-04-04 RX ORDER — OXYTOCIN/RINGER'S LACTATE 30/500 ML
62.5 PLASTIC BAG, INJECTION (ML) INTRAVENOUS CONTINUOUS
Status: ACTIVE | OUTPATIENT
Start: 2024-04-04 | End: 2024-04-04

## 2024-04-04 RX ORDER — FENTANYL CITRATE 50 UG/ML
INJECTION, SOLUTION INTRAMUSCULAR; INTRAVENOUS AS NEEDED
Status: DISCONTINUED | OUTPATIENT
Start: 2024-04-04 | End: 2024-04-04

## 2024-04-04 RX ORDER — PHENYLEPHRINE HYDROCHLORIDE 10 MG/ML
INJECTION INTRAVENOUS
Status: DISPENSED
Start: 2024-04-04 | End: 2024-04-05

## 2024-04-04 RX ORDER — DIPHENHYDRAMINE HCL 25 MG
25 TABLET ORAL EVERY 6 HOURS PRN
Status: DISCONTINUED | OUTPATIENT
Start: 2024-04-04 | End: 2024-04-06 | Stop reason: HOSPADM

## 2024-04-04 RX ORDER — SODIUM CHLORIDE, SODIUM LACTATE, POTASSIUM CHLORIDE, CALCIUM CHLORIDE 600; 310; 30; 20 MG/100ML; MG/100ML; MG/100ML; MG/100ML
125 INJECTION, SOLUTION INTRAVENOUS CONTINUOUS
Status: DISCONTINUED | OUTPATIENT
Start: 2024-04-04 | End: 2024-04-06 | Stop reason: HOSPADM

## 2024-04-04 RX ORDER — FENTANYL CITRATE/PF 50 MCG/ML
50 SYRINGE (ML) INJECTION
Status: DISCONTINUED | OUTPATIENT
Start: 2024-04-04 | End: 2024-04-05

## 2024-04-04 RX ORDER — HYDROMORPHONE HCL/PF 1 MG/ML
0.5 SYRINGE (ML) INJECTION EVERY 2 HOUR PRN
Status: ACTIVE | OUTPATIENT
Start: 2024-04-04 | End: 2024-04-05

## 2024-04-04 RX ORDER — OXYTOCIN/RINGER'S LACTATE 30/500 ML
PLASTIC BAG, INJECTION (ML) INTRAVENOUS
Status: COMPLETED
Start: 2024-04-04 | End: 2024-04-04

## 2024-04-04 RX ORDER — ACETAMINOPHEN 325 MG/1
975 TABLET ORAL EVERY 6 HOURS PRN
Status: DISPENSED | OUTPATIENT
Start: 2024-04-04 | End: 2024-04-05

## 2024-04-04 RX ORDER — IBUPROFEN 600 MG/1
600 TABLET ORAL EVERY 6 HOURS SCHEDULED
Status: DISCONTINUED | OUTPATIENT
Start: 2024-04-05 | End: 2024-04-06 | Stop reason: HOSPADM

## 2024-04-04 RX ORDER — ENOXAPARIN SODIUM 100 MG/ML
40 INJECTION SUBCUTANEOUS DAILY
Status: DISCONTINUED | OUTPATIENT
Start: 2024-04-05 | End: 2024-04-06 | Stop reason: HOSPADM

## 2024-04-04 RX ORDER — KETOROLAC TROMETHAMINE 30 MG/ML
INJECTION, SOLUTION INTRAMUSCULAR; INTRAVENOUS
Status: COMPLETED
Start: 2024-04-04 | End: 2024-04-04

## 2024-04-04 RX ORDER — NALBUPHINE HYDROCHLORIDE 10 MG/ML
3 INJECTION, SOLUTION INTRAMUSCULAR; INTRAVENOUS; SUBCUTANEOUS
Status: ACTIVE | OUTPATIENT
Start: 2024-04-04 | End: 2024-04-05

## 2024-04-04 RX ORDER — NALOXONE HYDROCHLORIDE 0.4 MG/ML
0.1 INJECTION, SOLUTION INTRAMUSCULAR; INTRAVENOUS; SUBCUTANEOUS
Status: ACTIVE | OUTPATIENT
Start: 2024-04-04 | End: 2024-04-05

## 2024-04-04 RX ORDER — OXYCODONE HYDROCHLORIDE 5 MG/1
5 TABLET ORAL EVERY 4 HOURS PRN
Status: DISPENSED | OUTPATIENT
Start: 2024-04-04 | End: 2024-04-05

## 2024-04-04 RX ORDER — ACETAMINOPHEN 325 MG/1
650 TABLET ORAL EVERY 6 HOURS SCHEDULED
Status: DISCONTINUED | OUTPATIENT
Start: 2024-04-05 | End: 2024-04-06 | Stop reason: HOSPADM

## 2024-04-04 RX ORDER — OXYCODONE HYDROCHLORIDE 5 MG/1
5 TABLET ORAL EVERY 4 HOURS PRN
Status: DISCONTINUED | OUTPATIENT
Start: 2024-04-05 | End: 2024-04-06 | Stop reason: HOSPADM

## 2024-04-04 RX ORDER — ONDANSETRON 2 MG/ML
4 INJECTION INTRAMUSCULAR; INTRAVENOUS EVERY 8 HOURS PRN
Status: DISCONTINUED | OUTPATIENT
Start: 2024-04-04 | End: 2024-04-04

## 2024-04-04 RX ORDER — ONDANSETRON 2 MG/ML
INJECTION INTRAMUSCULAR; INTRAVENOUS AS NEEDED
Status: DISCONTINUED | OUTPATIENT
Start: 2024-04-04 | End: 2024-04-04

## 2024-04-04 RX ORDER — SODIUM CHLORIDE, SODIUM LACTATE, POTASSIUM CHLORIDE, CALCIUM CHLORIDE 600; 310; 30; 20 MG/100ML; MG/100ML; MG/100ML; MG/100ML
125 INJECTION, SOLUTION INTRAVENOUS CONTINUOUS
Status: DISCONTINUED | OUTPATIENT
Start: 2024-04-04 | End: 2024-04-04

## 2024-04-04 RX ADMIN — KETOROLAC TROMETHAMINE 30 MG: 30 INJECTION, SOLUTION INTRAMUSCULAR; INTRAVENOUS at 14:08

## 2024-04-04 RX ADMIN — PHENYLEPHRINE HYDROCHLORIDE 25 MCG/MIN: 10 INJECTION INTRAVENOUS at 13:13

## 2024-04-04 RX ADMIN — Medication 250 MILLI-UNITS/MIN: at 14:06

## 2024-04-04 RX ADMIN — BUPIVACAINE HYDROCHLORIDE IN DEXTROSE 1.6 ML: 7.5 INJECTION, SOLUTION SUBARACHNOID at 13:11

## 2024-04-04 RX ADMIN — Medication 62.5 MILLI-UNITS/MIN: at 14:00

## 2024-04-04 RX ADMIN — SODIUM CHLORIDE, SODIUM LACTATE, POTASSIUM CHLORIDE, AND CALCIUM CHLORIDE 125 ML/HR: .6; .31; .03; .02 INJECTION, SOLUTION INTRAVENOUS at 23:59

## 2024-04-04 RX ADMIN — KETOROLAC TROMETHAMINE 30 MG: 30 INJECTION, SOLUTION INTRAMUSCULAR; INTRAVENOUS at 20:06

## 2024-04-04 RX ADMIN — Medication 250 MILLI-UNITS/MIN: at 13:34

## 2024-04-04 RX ADMIN — SODIUM CHLORIDE, SODIUM LACTATE, POTASSIUM CHLORIDE, AND CALCIUM CHLORIDE 125 ML/HR: .6; .31; .03; .02 INJECTION, SOLUTION INTRAVENOUS at 20:28

## 2024-04-04 RX ADMIN — SODIUM CHLORIDE, SODIUM LACTATE, POTASSIUM CHLORIDE, AND CALCIUM CHLORIDE: .6; .31; .03; .02 INJECTION, SOLUTION INTRAVENOUS at 12:55

## 2024-04-04 RX ADMIN — DOCUSATE SODIUM 100 MG: 100 CAPSULE, LIQUID FILLED ORAL at 17:05

## 2024-04-04 RX ADMIN — FENTANYL CITRATE 15 MCG: 50 INJECTION INTRAMUSCULAR; INTRAVENOUS at 13:11

## 2024-04-04 RX ADMIN — CEFAZOLIN SODIUM 2000 MG: 2 SOLUTION INTRAVENOUS at 12:36

## 2024-04-04 RX ADMIN — SODIUM CHLORIDE, SODIUM LACTATE, POTASSIUM CHLORIDE, AND CALCIUM CHLORIDE 125 ML/HR: .6; .31; .03; .02 INJECTION, SOLUTION INTRAVENOUS at 14:29

## 2024-04-04 RX ADMIN — ONDANSETRON 4 MG: 2 INJECTION INTRAMUSCULAR; INTRAVENOUS at 12:59

## 2024-04-04 RX ADMIN — MORPHINE SULFATE 0.15 MG: 0.5 INJECTION, SOLUTION EPIDURAL; INTRATHECAL; INTRAVENOUS at 13:11

## 2024-04-04 RX ADMIN — ACETAMINOPHEN 325MG 975 MG: 325 TABLET ORAL at 17:05

## 2024-04-04 NOTE — TELEPHONE ENCOUNTER
"Mela called reporting abdominal pain starting last night-unable to sleep through night due to pain. Describes as abdomen becoming hard/tight then releasing. Went to work this morning-unable to tolerate discomfort and went home. Pain is now every 3-4 minutes lasting approx 30 seconds. Denies LOF, +FM but not as active as typically is in the morning.  Advised to proceed to [facility]. ETA 30. TT to Dr. Nuñez and L/D nurse Olga Jaramillo  Reason for Disposition   Contractions < 10 minutes apart for 1 hour (i.e., 6 or more contractions an hour)    Answer Assessment - Initial Assessment Questions  1. ONSET: \"When did the symptoms begin?\"         Last night- unable to sleep through night  2. CONTRACTIONS: \"Describe the contractions that you are having.\" (e.g., duration, frequency, regularity, severity)      Abdomen hard /tight every 3-4 minutes- 30 seconds approx  3. JORDAN: \"What date are you expecting to deliver?\"      04/15/2024  4. PARITY: \"Have you had a baby before?\" If Yes, ask: \"How long did the labor last?\"      G3, P2-12 hours  5. FETAL MOVEMENT: \"Has the baby's movement decreased or changed significantly from normal?\"      +FM -not as active as normally  6. OTHER SYMPTOMS: \"Do you have any other symptoms?\" (e.g., leaking fluid from vagina, fever, hand/facial swelling)      Increase mucous discharge several days ago. Difficulty breathing during abdominal tightness    Protocols used: Pregnancy - Labor - -ADULT-[place]    "

## 2024-04-04 NOTE — LACTATION NOTE
This note was copied from a baby's chart.  CONSULT - LACTATION  Baby Girl Perez (Kimberly) 0 days female MRN: [id number]    [facility] NURSERY Room / Bed: (N)/(N) Encounter: [id number]    Maternal Information     MOTHER:  Mela Perez  Maternal Age: 28 y.o.   OB History: # 1 - Date: 18, Sex: Male, Weight: 2955 g (6 lb 8.2 oz), GA: 40w3d, Delivery: Vaginal, Spontaneous, Apgar1: 3, Apgar5: 7, Living: Living, Birth Comments: None    # 2 - Date: 20, Sex: Male, Weight: 3205 g (7 lb 1.1 oz), GA: 39w1d, Delivery: Vaginal, Spontaneous, Apgar1: 9, Apgar5: 9, Living: Living, Birth Comments: None    # 3 - Date: None, Sex: None, Weight: None, GA: None, Delivery: None, Apgar1: None, Apgar5: None, Living: None, Birth Comments: None   Previouse breast reduction surgery? No    Lactation history:   Has patient previously breast fed: Yes   How long had patient previously breast fed: 6 months @ breast then pump & feed till about 9 months   Previous breast feeding complications: None     Past Surgical History:   Procedure Laterality Date    TYMPANOPLASTY      WISDOM TOOTH EXTRACTION          Birth information:  YOB: 2026   Time of birth: 1:33 PM   Sex: female   Delivery type:     Birth Weight: 3810 g (8 lb 6.4 oz)   Percent of Weight Change: 0%     Gestational Age: 38w3d   [unfilled]    Assessment     Breast and nipple assessment:  Denies need for assistance at this time     Assessment: sleepy    Feeding assessment: feeding well after delivery as per Mom with experience    LATCH:  Latch: Grasps breast, tongue down, lips flanged, rhythmic sucking   Audible Swallowing: Spontaneous and intermittent (24 hours old)   Type of Nipple: Everted (After stimulation)   Comfort (Breast/Nipple): Soft/non-tender   Hold (Positioning): Partial assist, teach one side, mother does other, staff holds   LATCH Score: 9          Feeding recommendations:  breast  feed on demand    Met with mother & father to review Ready, Set Baby and  discharge breastfeeding booklet including the feeding log. Also reviewed  positioning and alignment.   Mom is encouraged to:     - Bring baby up to the breast (use of pillows to elevate so baby's torso is against mom's breasts)   - Skin to skin for feedings with top hand exposed to show signs of satiation   - Chin deep into breast tissue (make baby look up to the nipple)   - nose aligned to the nipple   -Wait for wide gape, drag chin on the breast so nipple is aimed at the upper, back palate  - Cheek should be touching breast   - Deep, firm hold of baby with ear, shoulder, hip alignment    Emphasized 8 or more (12) feedings in a 24 hour period, what to expect for the number of diapers per day of life and the progression of properties of the  stooling pattern.    List of reasons to call a lactation consultant.    Feeding logs  Feeding cues  Hand expression  Baby's Second day (cluster feeding)  Breastfeeding and Your Lifestyle (Medications, Alcohol, Caffeine, Smoking, Street Drugs, Methadone)  First Two Weeks Survival Guide for Breastfeeding  Breast Changes  Physical Therapy  Storage and Handling of Breast milk  How to Keep Your Breast Pump Kit Clean  The Employed Breastfeeding Mother  Mixed feeding  Bottle feeding like breastfeeding (paced bottle feeding)  astfeeding and your lifestyle, storage and preparation of breast milk, how to keep you breast pump clean, the employed breastfeeding mother and paced bottle feeding handouts.     Booklet included Breastfeeding Resources for after discharge including access to the number for the [facility].    Encouraged parents to call for assistance, questions, and concerns about breastfeeding.  Extension provided.            Ana M Eaton RN 2024 5:01 PM

## 2024-04-04 NOTE — OP NOTE
OPERATIVE REPORT  PATIENT NAME: Mela Perez    :  1995  MRN: [id number]  Pt Location: [facility] OR ROOM 01    SURGERY DATE: 2024    Surgeons and Role:  Panel 1:     * Elizabeth Malik MD - Primary     * Amina Chavarria MD  Panel 2:     * Haven Jones MD - Primary (for bilateral salpingectomy)    Preop Diagnosis:  IUP at 38+2 weeks  Breech presentation  Active labor  Obesity  Gestational thrombocytopenia  Desire for permanent sterilization    Procedure(s) (LRB):   SECTION () (N/A)  Bilateral Salpingectomy    Specimen(s):  ID Type Source Tests Collected by Time Destination   1 :  Tissue Fallopian Tube, Left TISSUE EXAM Elizabeth Malik MD 2024 1350    2 :  Tissue Fallopian Tube, Right TISSUE EXAM Elizabeth Malik MD 2024 1350    A :  Cord Blood Cord BLOOD GAS, VENOUS, CORD, BLOOD GAS, ARTERIAL, CORD Elizabeth Malik MD 2024 1323    B :  Tissue (Placenta on Hold) OB Only Placenta PLACENTA IN STORAGE Elizabeth Malik MD 2024 1323        Surgical QBL:  102 cc    Drains:  Urethral Catheter Double-lumen 16 Fr. (Active)   Number of days: 0       Anesthesia Type:   Spinal    Operative Indications:  Breech presentation  Labor     Yosef Group Classification System:  No Multiple pregnancy, No Transverse or oblique lie, Breech lie, Multiparous +  is YOSEF GROUP 7    Operative Findings:  1. Viable female  at 1333 with APGARs of 8 and 9 at 1 and 5 minutes. Fetus weight: 8lb 6.4oz.  2. Normal intact placenta with eccentrally inserted 3 vessel cord expressed at 1335.  3. Normal uterus, bilateral tubes, and ovaries.  4. Blood gases:   Arterial pH: 7.281   Arterial base excess: -4.4   Venous pH: 7.343   Venous base excess: -3.7    Procedure and technique:  The patient was taken to the operating room. Spinal anesthesia was adequately established and Ancef was given for preoperative prophylaxis. The patient was then placed in the dorsal supine position with a left tilt  of the hips. The patient was prepped with chlorhexidine for vaginal prep and chloraprep for abdominal prep and draped in the usual sterile fashion.    A time out was performed to confirm correct patient and correct procedure. An incision was made in the skin with a surgical scalpel, and sharp dissection was carried out over subsequent layers of tissue including the fascia, followed by the Bovie electrocautery for hemostasis. The fascia was incised bilaterally of the midline, and the fascial incision was extended bilaterally using the curved Messina scissors.    The superior edge of the fascial incision was grasped with Kocher clamps, tented up, and the underlying rectus muscles were dissected off bluntly and sharply using the scalpel. The rectus muscles were then divided at midline, and the peritoneum was identified and then entered and extended superiorly and inferiorly in blunt fashion. The bladder blade was inserted, and a transverse incision was made in the lower uterine segment using a new surgical blade.  The uterine incision was extended laterally and cephalad bilaterally using blunt dissection.  The amniotic sac was entered and the amniotic fluid was noted to be clear.    The surgeon's hand was placed in the uterus, and fetal pelvis was palpated with the fetus in footling breech position.  The fetal foot was grasped and elevated through the hysterotomy.  The other fetal foot was then palpated and grasped, and a Pinard maneuver was performed to deliver it through the hysterotomy.  The fetal pelvis was delivered through the hysterotomy with the surgeon's hand grasping the fetal feet, at which time the surgeon's hands were placed with thumbs on the fetal sacrum and fingers on the fetal anterior superior iliac spines.  The fetus was delivered to the level of the scapulae, at which point Loveset maneuver was performed to deliver the fetal arms.  The Mauriceau Smellie Veit maneuver was performed to flex the fetal  head, after which the fetal head delivered with the assistance of gentle upward traction on the fetus's feet.  There was a nuchal cord noted. This was reduced after delivery of the fetal head.    On delivery, the cord was doubly clamped and cut after delayed cord clamping. The infant was then passed off the table to the awaiting  staff. The  was noted to cry spontaneously and moved all extremities. Venous and arterial blood gas, cord blood, and portion of cord was obtained for analysis and routine blood testing. The placenta delivery was then sent to storage. Placenta was noted to be intact with an eccentrically inserted three-vessel cord. Oxytocin was administered by IV infusion to enhance uterine contraction. The uterus was exteriorized and cleared of all clots and remaining products of conception.  The uterine incision was re-approximated using 0 Vicryl in a running locked fashion. A second horizontal imbricating stitch with 0 Vicryl was applied. The uterine incision was examined and noted to be hemostatic.    At this point, Dr. Jones stepped in as the surgeon, and attention turned to the adnexa.  The bilateral Fallopian tubes were identified. First the right Fallopian tube was identified and elevated, and then using the Enseal, the mesosalpinx was coagulated and cut along the tube.  The tube was then amputated just distal of the uterine cornua.  The same series of steps was performed on the contralateral side, and the tubes were sent for routine pathology.    Dr. Malik then stepped in as the surgeon once again.  The posterior cul-de-sac was cleared of all clots and products of conception. The uterus was replaced into the abdomen, and the paracolic gutters were cleared of all clots.  The uterine incision was once again re-examined and noted to be hemostatic. The sites of the bilateral salpingectomy were examined and also noted to be hemostatic. The rectus muscles were examined and noted to be  hemostatic The fascia was re-approximated using 0 Vicryl in a running nonlocked fashion. The subcutaneous tissue was irrigated and cleared of all clots and debris.  Good hemostasis was noted with Bovie electrocautery. The subcutaneous tissue was re-approximated with 0 Vicryl in running non-locked fashion. The skin incision was closed with 4-0 Monocryl in running fashion. Good hemostasis was noted.    Patient tolerated the procedure well. All needle, sponge, and instrument counts were noted to be correct x2 at the end of the procedure. Patient was transferred to the recovery room in stable condition. Dr. Malik was present for the procedure.    Complications:   None apparent    Patient Disposition:  Postpartum recovery        SIGNATURE: Amina Chavarria MD  DATE: April 4, 2024  TIME: 2:23 PM    I agree with the operative report as dictated by Dr. Chavarria and edited by me. I was present for the entire procedure and participated in the entire procedure except the bilateral salpingectomy.    Elizabeth Malik MD

## 2024-04-04 NOTE — ANESTHESIA POSTPROCEDURE EVALUATION
"Post-Op Assessment Note    CV Status:  Stable    Pain management: adequate       Mental Status:  Alert and awake   Hydration Status:  Euvolemic   PONV Controlled:  Controlled   Airway Patency:  Patent     Post Op Vitals Reviewed: Yes    No anethesia notable event occurred.    Staff: Anesthesiologist               BP      Temp      Pulse     Resp      SpO2      /63   Pulse 97   Temp 98.2 °F (36.8 °C)   Resp 18   Ht 5' 3\" (1.6 m)   Wt 101 kg (223 lb)   LMP 07/10/2023   SpO2 97%   Breastfeeding Yes   BMI 39.50 kg/m²     "

## 2024-04-04 NOTE — H&P
H & P- Obstetrics   Mela Perez 28 y.o. female MRN: [id number]  Unit/Bed#: LD TRIAGE 3- Encounter: [id number]    Assessment: 28 y.o.  at 38w3d admitted for labor for 1LTCS with breech presentation  SVE: 3.5/50/-3  FHT: Cat 1  Clinical EFW: 78% ; breech presentation confirmed on admission  GBS status: Negative     Postpartum contraception plan: Patient desires bilateral salpingectomy at time of  section.  Risks of the procedure were discussed including injury to surrounding organs, blood vessels, nerves, bleeding, infection, risk of regret.  She strongly desires to have the procedure completed after discussing with her partner.  She understands that if she were to plan for another pregnancy after her surgery that it would be via in vitro fertilization.  Consent form was signed on admission.  She is agreeable to blood products if required.    Plan:   Sterilization  Assessment & Plan  Desire for permanent contraception. Wishes to have bilateral salpingectomy at time of c/s.   Consent signed on admission.     Breech presentation of fetus  Assessment & Plan  Confirmed breech presentation on admission    Thrombocytopenia affecting pregnancy (HCC)  Assessment & Plan   admit CBC showed Hgb of 108,000    Prior pregnancy complicated by PIH, antepartum, third trimester  Assessment & Plan  Hx of HTN in prior pregnancy    Current duncan pregnancy with history of congenital heart disease in prior child, antepartum  Assessment & Plan  Hx of ASD/VSD in her oldest child  No complications this pregnancy.     Echo completed last scan reviewed - 12/15/23 normal        * 38 weeks gestation of pregnancy  Assessment & Plan  Admit for 1LTCS (contractions, breech presentation)  T&S, CBC, Syphilis IGG/IGM (screening)   NPO  IV fluids  2g IV Ancef  To OR for 1LTCS  & BS        Discussed case and plan w/ Dr. Malik      Chief Complaint: contraction    HPI: Mela Perez is a 28 y.o.  with an JORDAN of  4/15/2024, by Last Menstrual Period at 38w3d who is being admitted for labor . She complains of regular uterine contractions, has no LOF, and reports no VB. She states she has felt good FM.    Patient Active Problem List   Diagnosis    Current duncan pregnancy with history of congenital heart disease in prior child, antepartum    Obesity affecting pregnancy    Pap smear abnormality of cervix with LGSIL    Prior pregnancy complicated by PIH, antepartum, third trimester    38 weeks gestation of pregnancy    Thrombocytopenia affecting pregnancy (HCC)    Excessive fetal growth affecting management of mother in third trimester, antepartum    Breech presentation of fetus    Sterilization       Baby complications/comments:   Fetal echo completed in December within normal limits.  Echo was completed due to history of ASD/VSD and previous pregnancy    Review of Systems   Gastrointestinal:  Positive for abdominal pain.   All other systems reviewed and are negative.      OB Hx:  OB History    Para Term  AB Living   3 2 2 0 0 2   SAB IAB Ectopic Multiple Live Births   0 0 0 0 2      # Outcome Date GA Lbr Lucian/2nd Weight Sex Delivery Anes PTL Lv   3 Current            2 Term 20 39w1d / 00:11 3205 g (7 lb 1.1 oz) M Vag-Spont EPI N MEDHAT   1 Term 18 40w3d / 02:53 2955 g (6 lb 8.2 oz) M Vag-Spont EPI N MEDHAT      Complications: Meconium aspiration       Past Medical Hx:  Past Medical History:   Diagnosis Date    Gestational hypertension, third trimester 2020    HPV (human papilloma virus) infection     Pap smear abnormality of cervix with LGSIL     Seasonal allergies     Varicella     vaccination       Past Surgical hx:  Past Surgical History:   Procedure Laterality Date    TYMPANOPLASTY      WISDOM TOOTH EXTRACTION         Social Hx:  Alcohol use: denies  Tobacco use: denies  Other substance use: denies    Allergies   Allergen Reactions    Pollen Extract     Seasonal Ic  [Cholestatin]         Medications Prior to Admission   Medication    ferrous sulfate 324 (65 Fe) mg    Prenatal Vit-Fe Fumarate-FA (PRENATAL 1+1 PO)       Objective:  Temp:  [98 °F (36.7 °C)] 98 °F (36.7 °C)  HR:  [120] 120  Resp:  [18] 18  BP: (105)/(72) 105/72  Body mass index is 39.5 kg/m².     Physical Exam:  Physical Exam  Constitutional:       General: She is not in acute distress.     Appearance: Normal appearance. She is not ill-appearing or toxic-appearing.      Comments: Visibly uncomfortable with contractions   Genitourinary:      Vulva normal.      No vaginal discharge.   HENT:      Head: Normocephalic and atraumatic.      Right Ear: External ear normal.      Left Ear: External ear normal.      Nose: Nose normal.      Mouth/Throat:      Mouth: Mucous membranes are moist.   Eyes:      General: Lids are normal.      Extraocular Movements: Extraocular movements intact.      Conjunctiva/sclera: Conjunctivae normal.   Cardiovascular:      Rate and Rhythm: Regular rhythm. Tachycardia present.      Pulses: Normal pulses.      Heart sounds: Normal heart sounds. No murmur heard.  Pulmonary:      Effort: Pulmonary effort is normal. No respiratory distress.      Breath sounds: Normal breath sounds.   Abdominal:      Tenderness: There is no abdominal tenderness.      Comments: Contractions palpable   Musculoskeletal:         General: Normal range of motion.      Cervical back: Full passive range of motion without pain and normal range of motion.      Right lower leg: No edema.      Left lower leg: No edema.      Right foot: Normal range of motion.      Left foot: Normal range of motion.   Neurological:      General: No focal deficit present.      Mental Status: She is alert.      Motor: No weakness.   Skin:     General: Skin is warm and dry.   Psychiatric:         Mood and Affect: Mood normal.         Judgment: Judgment normal.   Vitals reviewed. Exam conducted with a chaperone present.          FHT: 150 bpm, moderate variability,  accelerations present, no decelerations    TOCO:  irregular, visibly uncomfortable with contractions    Lab Results   Component Value Date    WBC 8.60 01/19/2024    HGB 9.7 (L) 01/19/2024    HCT 30.3 (L) 01/19/2024     (L) 01/19/2024     Lab Results   Component Value Date    K 4.0 10/02/2023     10/02/2023    CO2 23 10/02/2023    BUN 8 10/02/2023    CREATININE 0.65 10/02/2023    AST 12 (L) 10/02/2023    ALT 8 10/02/2023     Prenatal Labs: Reviewed      Blood type: O positive  Antibody: negative  GBS: negative  HIV: non-reactive  Rubella: immune  Syphilis IgM/IgG: non-reactive  HBsAg: non-reactive  HCAb: non-reactive  Chlamydia: negative  Gonorrhea: negative  Diabetes 1 hour screen: Normal  Platelets: 120,000  Hgb: 9.7 as of 1/19/24  >2 Midnights  INPATIENT     Signature/Title: Rony Cunningham DO  Date: 4/4/2024  Time: 12:09 PM

## 2024-04-04 NOTE — PLAN OF CARE
Problem: PAIN - ADULT  Goal: Verbalizes/displays adequate comfort level or baseline comfort level  Description: Interventions:  - Encourage patient to monitor pain and request assistance  - Assess pain using appropriate pain scale  - Administer analgesics based on type and severity of pain and evaluate response  - Implement non-pharmacological measures as appropriate and evaluate response  - Consider cultural and social influences on pain and pain management  - Notify physician/advanced practitioner if interventions unsuccessful or patient reports new pain  Outcome: Progressing     Problem: INFECTION - ADULT  Goal: Absence or prevention of progression during hospitalization  Description: INTERVENTIONS:  - Assess and monitor for signs and symptoms of infection  - Monitor lab/diagnostic results  - Monitor all insertion sites, i.e. indwelling lines, tubes, and drains  - Monitor endotracheal if appropriate and nasal secretions for changes in amount and color  - Hortense appropriate cooling/warming therapies per order  - Administer medications as ordered  - Instruct and encourage patient and family to use good hand hygiene technique  - Identify and instruct in appropriate isolation precautions for identified infection/condition  Outcome: Progressing  Goal: Absence of fever/infection during neutropenic period  Description: INTERVENTIONS:  - Monitor WBC    Outcome: Progressing     Problem: SAFETY ADULT  Goal: Patient will remain free of falls  Description: INTERVENTIONS:  - Educate patient/family on patient safety including physical limitations  - Instruct patient to call for assistance with activity   - Consult OT/PT to assist with strengthening/mobility   - Keep Call bell within reach  - Keep bed low and locked with side rails adjusted as appropriate  - Keep care items and personal belongings within reach  - Initiate and maintain comfort rounds  - Make Fall Risk Sign visible to staff  - Apply yellow socks and bracelet  for high fall risk patients  - Consider moving patient to room near nurses station  Outcome: Progressing  Goal: Maintain or return to baseline ADL function  Description: INTERVENTIONS:  -  Assess patient's ability to carry out ADLs; assess patient's baseline for ADL function and identify physical deficits which impact ability to perform ADLs (bathing, care of mouth/teeth, toileting, grooming, dressing, etc.)  - Assess/evaluate cause of self-care deficits   - Assess range of motion  - Assess patient's mobility; develop plan if impaired  - Assess patient's need for assistive devices and provide as appropriate  - Encourage maximum independence but intervene and supervise when necessary  - Involve family in performance of ADLs  - Assess for home care needs following discharge   - Consider OT consult to assist with ADL evaluation and planning for discharge  - Provide patient education as appropriate  Outcome: Progressing  Goal: Maintains/Returns to pre admission functional level  Description: INTERVENTIONS:  - Perform AM-PAC 6 Click Basic Mobility/ Daily Activity assessment daily.  - Set and communicate daily mobility goal to care team and patient/family/caregiver.   - Collaborate with rehabilitation services on mobility goals if consulted  - Out of bed for toileting  - Record patient progress and toleration of activity level   Outcome: Progressing     Problem: Knowledge Deficit  Goal: Patient/family/caregiver demonstrates understanding of disease process, treatment plan, medications, and discharge instructions  Description: Complete learning assessment and assess knowledge base.  Interventions:  - Provide teaching at level of understanding  - Provide teaching via preferred learning methods  Outcome: Progressing     Problem: DISCHARGE PLANNING  Goal: Discharge to home or other facility with appropriate resources  Description: INTERVENTIONS:  - Identify barriers to discharge w/patient and caregiver  - Arrange for needed  discharge resources and transportation as appropriate  - Identify discharge learning needs (meds, wound care, etc.)  - Arrange for interpretive services to assist at discharge as needed  - Refer to Case Management Department for coordinating discharge planning if the patient needs post-hospital services based on physician/advanced practitioner order or complex needs related to functional status, cognitive ability, or social support system  Outcome: Progressing

## 2024-04-04 NOTE — ASSESSMENT & PLAN NOTE
Hx of ASD/VSD in her oldest child  No complications this pregnancy.   Echo completed last scan reviewed - 12/15/23 normal

## 2024-04-04 NOTE — ANESTHESIA PROCEDURE NOTES
Spinal Block    Patient location during procedure: OR  Start time: 4/4/2024 1:11 PM  Reason for block: at surgeon's request and primary anesthetic  Staffing  Performed by: Neha Sales CRNA  Authorized by: Fab Chavez DO    Preanesthetic Checklist  Completed: patient identified, IV checked, risks and benefits discussed, surgical consent, monitors and equipment checked, pre-op evaluation and timeout performed  Spinal Block  Patient position: sitting  Prep: Betadine  Patient monitoring: frequent blood pressure checks, heart rate and continuous pulse ox  Approach: midline  Location: L3-4  Injection technique: single-shot  Needle  Needle type: pencil-tip   Needle gauge: 25 G  Needle length: 10 cm  Assessment  Sensory level: T4  Injection Assessment:  negative aspiration for heme, no paresthesia on injection and positive aspiration for clear CSF.  Post-procedure:  adhesive bandage applied, pressure dressing applied, secured with tape, site cleaned and sterile dressing applied

## 2024-04-04 NOTE — ASSESSMENT & PLAN NOTE
mL  Hgb 11.5 -> 9.9 -> Venofer  Pain well controlled  Voiding spontaneously  Appropriate bowel function  Tolerating PO fluids and solids  Breastfeeding  Lochia wnl  Continue routine postpartum care  DVT ppx: SCDs, Lovenox 40 mg qd (BMI 39)

## 2024-04-04 NOTE — DISCHARGE SUMMARY
Discharge Summary - OB/GYN   Mela Perez 28 y.o. female MRN: [id number]  Unit/Bed#: -01 Encounter: [id number]      Admission Date: 2024     Discharge Date: 24    Admitting Diagnosis:   Pregnancy at 38w3d  Breech presentation  Contractions  H/o HTN in prior pregnancy  Thrombocytopenia    Discharge Diagnosis:   Same, delivered    Procedures: primary  section, low transverse incision with bilateral salpingectomy    Admitting Attending: Elizabeth Malik MD    Delivering Attending: Dr. Malik    Discharging Attending: Dr. Soliman    [facility] Course:   Mela Perez is a 28 y.o.  at 38w3d wks who was initially admitted for contractions with breech presentation with plan for delivery via 1LTCS.     She delivered a viable female  on 24 at 1333. Weight 8lbs 6.4oz via primary  section, low transverse incision with bilateral salpingectomy for permanent sterilization. Apgars were 8 (1 min) and 9 (5 min).  was transferred to  nursery. Patient tolerated the procedure well and was transferred to recovery in stable condition.     Her post-operative course was uncomplicated. Preoperative hemoglobin was 11.5, postoperative was 9.9.  Venofer was given. Her postoperative pain was well controlled with oral analgesics.    On day of discharge, she was ambulating and able to reasonably perform all ADLs. She was voiding and had appropriate bowel function. Pain was well controlled. She was discharged home on post-operative day #2 without complications. Patient was instructed to follow up with her OB as an outpatient and was given appropriate warnings to call provider if she develops signs of infection or uncontrolled pain.    Complications: none apparent    Condition at discharge: good     Discharge instructions/Information to patient and family:   See after visit summary for information provided to patient and family.      Provisions for Follow-Up Care:  See  after visit summary for information related to follow-up care and any pertinent home health orders.      Disposition: Home    Planned Readmission: No    Discharge Medications:  For a complete list of the patient's medications, please refer to her med rec.        Amina Chavarria MD  OBGYN Resident  04/06/24  7:28 AM

## 2024-04-04 NOTE — ANESTHESIA PREPROCEDURE EVALUATION
Procedure:   SECTION () (Uterus)    Relevant Problems   GYN   (+) 38 weeks gestation of pregnancy   (+) Current duncan pregnancy with history of congenital heart disease in prior child, antepartum      HEMATOLOGY   (+) Thrombocytopenia affecting pregnancy (HCC)      Obstetrics/Gynecology   (+) Breech presentation of fetus   (+) Obesity affecting pregnancy      Other   (+) Sterilization        Physical Exam    Airway    Mallampati score: III  TM Distance: >3 FB  Neck ROM: full     Dental   No notable dental hx     Cardiovascular  Rhythm: regular, Rate: normal, Cardiovascular exam normal    Pulmonary   Breath sounds clear to auscultation    Other Findings  post-pubertal.      Anesthesia Plan  ASA Score- 2 Emergent    Anesthesia Type- spinal with ASA Monitors.         Additional Monitors:     Airway Plan:            Plan Factors-    Chart reviewed.   Existing labs reviewed. Patient summary reviewed.                  Induction-     Postoperative Plan- Plan for postoperative opioid use.     Informed Consent- Anesthetic plan and risks discussed with patient.  I personally reviewed this patient with the CRNA. Discussed and agreed on the Anesthesia Plan with the CRNA..

## 2024-04-04 NOTE — DISCHARGE INSTRUCTIONS
WHAT YOU NEED TO KNOW:   A , or  section, is abdominal surgery to deliver your baby.  DISCHARGE INSTRUCTIONS:   Call 911 for any of the following:   You feel lightheaded, short of breath, and have chest pain.     You cough up blood.  Seek care immediately if:   Blood soaks through your bandage.     Your stitches come apart.     Your arm or leg feels warm, tender, and painful. It may look swollen and red.  Contact your OB if:   You have heavy vaginal bleeding that fills 1 or more sanitary pads in 1 hour.    You have a fever.     Your incision is swollen, red, or draining pus.     You have questions or concerns about yourself or your baby.  Medicines:  You may  need any of the following:  Prescription pain medicine  may be given. Ask how to take this medicine safely.     Acetaminophen  decreases pain and fever. It is available without a doctor's order. Ask how much to take and how often to take it. Follow directions. Acetaminophen can cause liver damage if not taken correctly.    NSAIDs , such as ibuprofen, help decrease swelling, pain, and fever. NSAIDs can cause stomach bleeding or kidney problems in certain people. If you take blood thinner medicine, always ask your healthcare provider if NSAIDs are safe for you. Always read the medicine label and follow directions.    Take your medicine as directed.  Contact your healthcare provider if you think your medicine is not helping or if you have side effects. Tell him or her if you are allergic to any medicine. Keep a list of the medicines, vitamins, and herbs you take. Include the amounts, and when and why you take them. Bring the list or the pill bottles to follow-up visits. Carry your medicine list with you in case of an emergency.  Wound care:  Carefully wash your wound with soap and water every day. Keep your wound clean and dry. Wear loose, comfortable clothes that do not rub against your wound. Ask your OB about bathing and  showering.  Limit activity as directed:   Ask when it is safe for you to drive, walk up stairs, lift heavy objects, and have sex.     Ask when it is okay to exercise, and what types of exercise to do. Start slowly and do more as you get stronger.  Drink liquids as directed:  Liquids help keep you hydrated after your procedure and decrease your risk for a blood clot. Ask how much liquid to drink each day and which liquids are best for you.   Follow up with your OB as directed:  You may need to return to have your stitches or staples removed. Write down your questions so you remember to ask them during your visits.  © 2017 [employer] Information is for End User's use only and may not be sold, redistributed or otherwise used for commercial purposes. All illustrations and images included in CareNotes® are the copyrighted property of [employer]. or [employer].  The above information is an  only. It is not intended as medical advice for individual conditions or treatments. Talk to your doctor, nurse or pharmacist before following any medical regimen to see if it is safe and effective for you.      Postpartum Perineal Care   WHAT YOU NEED TO KNOW:   Postpartum perineal care is care for your perineum after you have a baby. The perineum is your vagina and anus.   DISCHARGE INSTRUCTIONS:   Care for your perineum:  Healthcare providers will give you a small squirt bottle and show you how to use it. Do the following after you use the toilet and before you put on a new pad:  Remove the soiled pad    Use the squirt bottle to rinse your perineum from front to back while you sit on the toilet     Pat the area dry from front to back with toilet paper or a cotton cloth     Put on a fresh pad    Wash your hands  Decrease pain:  Ask your healthcare provider about these and other ways to decrease perineal pain:  Sitz baths:  Healthcare providers may give you a portable sitz bath. This is a  small tub that fits in the toilet. Fill the sitz bath or bathtub with 4 to 6 inches of warm water. Sit in the warm water for 20 minutes 2 to 3 times a day.    Ice:  Ice helps decrease swelling and pain. Ice may also help prevent tissue damage. Use an ice pack, or put crushed ice in a plastic bag. Cover it with a towel and place it on your perineum for 15 to 20 minutes every hour, or as directed.    Medicine spray, wipes, or pads:  Healthcare providers may give you a medicine spray or wipes soaked with numbing medicine to decrease the pain. Pads that contain an herb called witch hazel may also help reduce pain. Use these after perineal care or a sitz bath.  Follow up with your healthcare provider as directed:  Write down your questions so you remember to ask them during your visits.   Contact your healthcare provider if:   You have heavy vaginal bleeding that fills 1 or more sanitary pads in 1 hour.    You have foul-smelling vaginal discharge.    You feel weak or lightheaded.    You have questions or concerns about your condition or care.  Seek care immediately or call 911 if:   You have large blood clots or bright red blood coming from your vagina.    You have abdominal pain, vomiting, and a fever.  © 2017 [employer] Information is for End User's use only and may not be sold, redistributed or otherwise used for commercial purposes. All illustrations and images included in CareNotes® are the copyrighted property of [employer]. or [employer].  The above information is an  only. It is not intended as medical advice for individual conditions or treatments. Talk to your doctor, nurse or pharmacist before following any medical regimen to see if it is safe and effective for you.      Postpartum Depression   WHAT YOU NEED TO KNOW:   What is postpartum depression?  Postpartum depression is a mood disorder that occurs after giving birth. A mood is an emotion or a feeling. Moods  affect your behavior and how you feel about yourself and life in general. Depression is a sad mood that you cannot control. Women often feel sad, afraid, or nervous after their baby is born. These feelings are called postpartum blues or baby blues, and they usually go away in 1 to 2 weeks. With postpartum depression, these symptoms get worse and continue for more than 2 weeks. Postpartum depression is a serious condition that affects your daily activities and relationships.   What causes postpartum depression?  Healthcare providers do not know exactly what causes postpartum depression. It may be caused by a sudden drop in hormone levels after childbirth. A previous episode of postpartum depression or a family history of depression may increase your risk. Several things may trigger postpartum depression:  Lack of support from the baby's father or other family members    Feeling more tired than usual    Stress, a poor diet, or lack of sleep    Pain after childbirth or pain during breastfeeding    Sudden change in lifestyle  How is postpartum depression diagnosed?  Postpartum depression affects your daily activities and your relationships with other people. Healthcare providers will ask you questions about your signs and symptoms and how they are affecting your life. The symptoms of postpartum depression usually begin within 1 month after childbirth. You feel depressed or lose interest in activities you enjoy nearly every day for at least 2 weeks. You also have 4 or more of the following symptoms:  You feel tired or have less energy than usual.     You feel unimportant or guilty most of the time.    You think about hurting or killing yourself.    Your appetite changes. You may lose your appetite and lose weight without trying. Your appetite may also increase and you may gain weight.    You are restless, irritable, or withdrawn.    You have trouble concentrating and remembering things. You have trouble doing daily tasks  or making decisions.    You have trouble sleeping, even after the baby is asleep.  How is postpartum depression treated?   Psychotherapy:  During therapy, you will talk with healthcare providers about how to cope with your feelings and moods. This can be done alone or in a group. It may also be done with family members or your partner.     Antidepressants:  This medicine is given to decrease or stop the symptoms of depression. You usually need to take antidepressants for several weeks before you begin to feel better. Do not stop taking antidepressants unless your healthcare provider tells you to. Healthcare providers may try a different antidepressant if one type does not work.  What can I do to feel better?   Rest:  Do not try to do everything all at the same time. Do only what is needed and let other things wait until later. Ask your family or friends for help, especially if you have other children. Ask your partner to help with night feedings or other baby care. Try to sleep when the baby naps.     Get emotional support:  Share your feelings with your partner, a friend, or another mother.     Take care of yourself:  Shower and dress each day. Do not skip meals. Try to get out of the house a little each day. Get regular exercise. Eat a healthy diet. Avoid alcohol because it can make your depression worse. Do not isolate yourself. Go for a walk or meet with a friend. It is also important that you have some time by yourself each day.  How do I find support and more information?   [employer] ([facility]), Public Information & Communication Branch  [address] [place] [address], Room [address], [facility] [place] [address]  [place] [address]   Phone: [phone]  Phone: [phone]  Web Address: http://www.[facility].nih.gov/  When should I contact my healthcare provider?   You cannot make it to your next visit.    Your depression does not get better with treatment or it gets worse.     You have questions or  concerns about your condition or care.  When should I seek immediate care or call 911?   You think about hurting or killing yourself, your baby, or someone else.    You feel like other people want to hurt you.     You hear voices telling you to hurt yourself or your baby.  CARE AGREEMENT:   You have the right to help plan your care. Learn about your health condition and how it may be treated. Discuss treatment options with your caregivers to decide what care you want to receive. You always have the right to refuse treatment. The above information is an  only. It is not intended as medical advice for individual conditions or treatments. Talk to your doctor, nurse or pharmacist before following any medical regimen to see if it is safe and effective for you.  ©  [employer] Information is for End User's use only and may not be sold, redistributed or otherwise used for commercial purposes. All illustrations and images included in CareNotes® are the copyrighted property of [employer]. or [employer].      Postpartum Bleeding   WHAT YOU NEED TO KNOW:   Postpartum bleeding is vaginal bleeding after childbirth. This bleeding is normal, whether your baby was born vaginally or by . It contains blood and the tissue that lined the inside of your uterus when you were pregnant.   DISCHARGE INSTRUCTIONS:   What to expect with postpartum bleeding:  Postpartum bleeding usually lasts at least 10 days, and may last longer than 6 weeks. Your bleeding may range from light (barely staining a pad) to heavy (soaking a pad in 1 hour). Usually, you have heavier bleeding right after childbirth, which slows over the next few weeks until it stops. The bleeding is red or dark brown with clots for the first 1 to 3 days. It then turns pink for several days, and then becomes a white or yellow discharge until it ends.  Follow up with your obstetrician as directed:  Do not have sex until your  obstetrician says it is okay. Write down your questions so you remember to ask them during your visits.  Contact your healthcare provider or obstetrician if:   Your bleeding increases, or you have heavy bleeding that soaks a pad in 1 hour for 2 hours in a row.    You pass large blood clots.    You are breathing faster than normal, or your heart is beating faster than normal.    You are urinating less than usual, or not at all.    You feel dizzy.    You have questions or concerns about your condition or care.  Seek immediate care or call 911 if:   You are suddenly short of breath and feel lightheaded.    You have sudden chest pain.  © 2017 [employer] Information is for End User's use only and may not be sold, redistributed or otherwise used for commercial purposes. All illustrations and images included in CareNotes® are the copyrighted property of [employer]. or [employer].  The above information is an  only. It is not intended as medical advice for individual conditions or treatments. Talk to your doctor, nurse or pharmacist before following any medical regimen to see if it is safe and effective for you.      Breast Care for the Breast Feeding Mother   WHAT YOU SHOULD KNOW:   Your breasts will go through normal changes while you are breastfeeding. Sometimes breast and nipple problems can develop while you are breastfeeding. Learn about changes that are normal and those that may be a problem. Breast care can help you prevent and manage problems so you and your baby can enjoy the benefits of breastfeeding.  AFTER YOU LEAVE:   Breast changes while you are breastfeeding:   For the first few days after your baby is born, your body makes a small amount of breast milk (colostrum). Within about 2 to 5 days, your body will begin making mature milk. It may take up to 10 days or longer for mature milk to come in. When your mature milk comes in, your breasts will become full and  firm. They may feel tender.     Breastfeeding your baby will decrease the full feeling in your breasts. You may feel a tingly sensation during feedings as milk is released from your breasts. This is called the milk let-down reflex. After 7 or more days, the fullness may feel like it has decreased. Your nipples should look the same as they did before you started breastfeeding. Breasts that feel full before and empty after breastfeeding are signs that breastfeeding is going well.  Breast problems that can occur while you are breastfeeding:   Nipple soreness  may occur when you begin to breastfeed your baby. You may also have nipple soreness if your baby is not latched on to your breast correctly. Correct positioning and latch-on may decrease or stop the pain in your nipples. Work with your caregivers to help your baby latch on correctly. It may also be helpful to place warm, wet compresses on your nipples to help decrease pain.     Plugged milk ducts  may cause painful breast lumps. Plugged ducts may be caused by not emptying your breasts completely during feedings. When your baby pauses during breastfeeding, massage and gently squeeze your breast. Gentle massage may unplug a blocked milk duct. Pump out any milk left in your breasts after your baby is done breastfeeding. Avoid wearing tight tops, tight bras, or under-wire bras, because they may put pressure on your breasts.    Engorgement  may occur as your milk comes in soon after you begin breastfeeding. Engorgement may cause your breasts to become swollen and painful. Your breasts may also become engorged if you miss a feeding or you do not breastfeed on demand. The best way to decrease engorgement symptoms is to empty your breasts by feeding your baby often. Engorgement can make it hard for your baby to latch on to your breast. If this happens, express a small amount of milk and then have your baby latch on. Cold compresses, gel packs, or ice packs on your breasts  can help decrease pain and swelling. Ask your caregiver how often and how long you should use cold, or ice packs.     A breast infection called mastitis  can develop if you have plugged milk ducts or engorgement. Mastitis causes your breasts to become red, swollen, and painful. You may also have flu-like symptoms, such as chills and a fever. Place heat on your breasts to help decrease the pain. You may want to place a moist, warm cloth on the painful breast or both of your breasts. Ask how often to do this. Your primary healthcare provider (PHP) may suggest that you take an NSAID, such as ibuprofen, to decrease pain and swelling. He may also order antibiotics to treat mastitis. Ask about feeding your baby when you have a breast infection.  How to help prevent or manage breast problems while you are breastfeeding:   Learn how to position your baby and latch him on correctly.  To latch your baby correctly to your breast, make sure that his mouth covers most of your areola (dark area around your nipple). He should not be attached only to the nipple. Your baby is latched on well if you feel comfortable and do not feel pain. A correct latch helps him get enough milk and can help to prevent sore nipples and other breast problems. There are several breastfeeding positions that you can try. Find the position that works best for you and your baby. Ask your caregiver for more information about how to hold and breastfeed your baby.     Prevent biting.  Your baby may get teeth at about 3 to 4 months of age. To help prevent biting, break his suction once he is finished or if he has fallen asleep. To break his suction, slip a finger into the side of his mouth. If your baby bites you, respond with surprise or unhappiness. Offer praise when he does not bite you.     Breastfeed your baby regularly.  Feed your baby 8 to 12 times a day. You may need to wake up your baby at night to feed him. It is okay to feed from 1 or both breasts  at each feeding. Your baby should breastfeed from both breasts equally over the course of a day. If your baby only feeds from 1 side during a feeding, offer your other breast to him first for the next feeding.     Schedule and keep follow-up visits.  Talk to your baby's pediatrician or your PHP during follow-up visits if you have breast problems. Caregivers may suggest that you, or you and your partner, attend classes on breastfeeding. You also may want to join a breastfeeding support group. Caregivers may suggest that you see a lactation consultant. This is a caregiver who can help you with breastfeeding.  Contact your PHP if:   You have a fever and chills.    You have body aches and you feel like you do not have any energy.    One or both of your breasts is red, swollen or hard, painful, and feels warm or hot.    You have breast engorgement that does not get better within 24 hours.     You see or feel a lump in your breast that hurts when you touch it.    You have nipple pain during breastfeeding or between feedings.     Your nipples are red, dry, cracked, or bleeding, or they have scabs on them.     You have questions or concerns about your condition or care.  © 2014 [employer]. Information is for End User's use only and may not be sold, redistributed or otherwise used for commercial purposes. All illustrations and images included in CareNotes® are the copyrighted property of [employer] or [employer].  The above information is an  only. It is not intended as medical advice for individual conditions or treatments. Talk to your doctor, nurse or pharmacist before following any medical regimen to see if it is safe and effective for you.

## 2024-04-05 LAB
ALBUMIN SERPL BCP-MCNC: 2.8 G/DL (ref 3.5–5)
ALP SERPL-CCNC: 92 U/L (ref 34–104)
ALT SERPL W P-5'-P-CCNC: 13 U/L (ref 7–52)
ANION GAP SERPL CALCULATED.3IONS-SCNC: 7 MMOL/L (ref 4–13)
AST SERPL W P-5'-P-CCNC: 24 U/L (ref 13–39)
BILIRUB SERPL-MCNC: 0.48 MG/DL (ref 0.2–1)
BUN SERPL-MCNC: 11 MG/DL (ref 5–25)
CALCIUM ALBUM COR SERPL-MCNC: 8.6 MG/DL (ref 8.3–10.1)
CALCIUM SERPL-MCNC: 7.6 MG/DL (ref 8.4–10.2)
CHLORIDE SERPL-SCNC: 105 MMOL/L (ref 96–108)
CO2 SERPL-SCNC: 20 MMOL/L (ref 21–32)
CREAT SERPL-MCNC: 0.74 MG/DL (ref 0.6–1.3)
ERYTHROCYTE [DISTWIDTH] IN BLOOD BY AUTOMATED COUNT: 13.8 % (ref 11.6–15.1)
GFR SERPL CREATININE-BSD FRML MDRD: 109 ML/MIN/1.73SQ M
GLUCOSE SERPL-MCNC: 92 MG/DL (ref 65–140)
HCT VFR BLD AUTO: 28.9 % (ref 34.8–46.1)
HGB BLD-MCNC: 9.9 G/DL (ref 11.5–15.4)
MCH RBC QN AUTO: 29.6 PG (ref 26.8–34.3)
MCHC RBC AUTO-ENTMCNC: 34.3 G/DL (ref 31.4–37.4)
MCV RBC AUTO: 86 FL (ref 82–98)
PLATELET # BLD AUTO: 80 THOUSANDS/UL (ref 149–390)
PMV BLD AUTO: 9.5 FL (ref 8.9–12.7)
POTASSIUM SERPL-SCNC: 3.8 MMOL/L (ref 3.5–5.3)
PROT SERPL-MCNC: 5.1 G/DL (ref 6.4–8.4)
RBC # BLD AUTO: 3.35 MILLION/UL (ref 3.81–5.12)
SODIUM SERPL-SCNC: 132 MMOL/L (ref 135–147)
TREPONEMA PALLIDUM IGG+IGM AB [PRESENCE] IN SERUM OR PLASMA BY IMMUNOASSAY: NORMAL
WBC # BLD AUTO: 7.74 THOUSAND/UL (ref 4.31–10.16)

## 2024-04-05 PROCEDURE — 80053 COMPREHEN METABOLIC PANEL: CPT

## 2024-04-05 PROCEDURE — 99024 POSTOP FOLLOW-UP VISIT: CPT | Performed by: OBSTETRICS & GYNECOLOGY

## 2024-04-05 PROCEDURE — 85027 COMPLETE CBC AUTOMATED: CPT

## 2024-04-05 RX ORDER — SIMETHICONE 80 MG
80 TABLET,CHEWABLE ORAL EVERY 6 HOURS PRN
Status: DISCONTINUED | OUTPATIENT
Start: 2024-04-05 | End: 2024-04-06 | Stop reason: HOSPADM

## 2024-04-05 RX ADMIN — ACETAMINOPHEN 325MG 975 MG: 325 TABLET ORAL at 09:57

## 2024-04-05 RX ADMIN — OXYCODONE 5 MG: 5 TABLET ORAL at 08:08

## 2024-04-05 RX ADMIN — ACETAMINOPHEN 325MG 650 MG: 325 TABLET ORAL at 18:21

## 2024-04-05 RX ADMIN — IBUPROFEN 600 MG: 600 TABLET ORAL at 15:41

## 2024-04-05 RX ADMIN — KETOROLAC TROMETHAMINE 30 MG: 30 INJECTION, SOLUTION INTRAMUSCULAR; INTRAVENOUS at 03:22

## 2024-04-05 RX ADMIN — ENOXAPARIN SODIUM 40 MG: 40 INJECTION SUBCUTANEOUS at 08:08

## 2024-04-05 RX ADMIN — DOCUSATE SODIUM 100 MG: 100 CAPSULE, LIQUID FILLED ORAL at 08:08

## 2024-04-05 RX ADMIN — DOCUSATE SODIUM 100 MG: 100 CAPSULE, LIQUID FILLED ORAL at 18:22

## 2024-04-05 RX ADMIN — ACETAMINOPHEN 325MG 975 MG: 325 TABLET ORAL at 03:22

## 2024-04-05 RX ADMIN — KETOROLAC TROMETHAMINE 30 MG: 30 INJECTION, SOLUTION INTRAMUSCULAR; INTRAVENOUS at 09:57

## 2024-04-05 NOTE — PLAN OF CARE
Problem: PAIN - ADULT  Goal: Verbalizes/displays adequate comfort level or baseline comfort level  Description: Interventions:  - Encourage patient to monitor pain and request assistance  - Assess pain using appropriate pain scale  - Administer analgesics based on type and severity of pain and evaluate response  - Implement non-pharmacological measures as appropriate and evaluate response  - Consider cultural and social influences on pain and pain management  - Notify physician/advanced practitioner if interventions unsuccessful or patient reports new pain  4/5/2024 0020 by Dena Lang RN  Outcome: Progressing  4/4/2024 1715 by Dena Lang RN  Outcome: Progressing     Problem: INFECTION - ADULT  Goal: Absence or prevention of progression during hospitalization  Description: INTERVENTIONS:  - Assess and monitor for signs and symptoms of infection  - Monitor lab/diagnostic results  - Monitor all insertion sites, i.e. indwelling lines, tubes, and drains  - Monitor endotracheal if appropriate and nasal secretions for changes in amount and color  - [place] appropriate cooling/warming therapies per order  - Administer medications as ordered  - Instruct and encourage patient and family to use good hand hygiene technique  - Identify and instruct in appropriate isolation precautions for identified infection/condition  4/5/2024 0020 by Dena Lang RN  Outcome: Progressing  4/4/2024 1715 by Dena Lang RN  Outcome: Progressing  Goal: Absence of fever/infection during neutropenic period  Description: INTERVENTIONS:  - Monitor WBC    4/5/2024 0020 by Dena Lang RN  Outcome: Progressing  4/4/2024 1715 by Dena Lang RN  Outcome: Progressing     Problem: SAFETY ADULT  Goal: Patient will remain free of falls  Description: INTERVENTIONS:  - Educate patient/family on patient safety including physical limitations  - Instruct patient to call for assistance with activity   - Consult OT/PT to assist with  strengthening/mobility   - Keep Call bell within reach  - Keep bed low and locked with side rails adjusted as appropriate  - Keep care items and personal belongings within reach  - Initiate and maintain comfort rounds  - Make Fall Risk Sign visible to staff  - Apply yellow socks and bracelet for high fall risk patients  - Consider moving patient to room near nurses station  4/5/2024 0020 by Dena Lang RN  Outcome: Progressing  4/4/2024 1715 by Dena Lang RN  Outcome: Progressing  Goal: Maintain or return to baseline ADL function  Description: INTERVENTIONS:  -  Assess patient's ability to carry out ADLs; assess patient's baseline for ADL function and identify physical deficits which impact ability to perform ADLs (bathing, care of mouth/teeth, toileting, grooming, dressing, etc.)  - Assess/evaluate cause of self-care deficits   - Assess range of motion  - Assess patient's mobility; develop plan if impaired  - Assess patient's need for assistive devices and provide as appropriate  - Encourage maximum independence but intervene and supervise when necessary  - Involve family in performance of ADLs  - Assess for home care needs following discharge   - Consider OT consult to assist with ADL evaluation and planning for discharge  - Provide patient education as appropriate  4/5/2024 0020 by Dena Lang RN  Outcome: Progressing  4/4/2024 1715 by Dena Lang RN  Outcome: Progressing  Goal: Maintains/Returns to pre admission functional level  Description: INTERVENTIONS:  - Perform AM-PAC 6 Click Basic Mobility/ Daily Activity assessment daily.  - Set and communicate daily mobility goal to care team and patient/family/caregiver.   - Collaborate with rehabilitation services on mobility goals if consulted  - Out of bed for toileting  - Record patient progress and toleration of activity level   4/5/2024 0020 by Dena Lang RN  Outcome: Progressing  4/4/2024 1715 by Dena Lang RN  Outcome: Progressing      Problem: Knowledge Deficit  Goal: Patient/family/caregiver demonstrates understanding of disease process, treatment plan, medications, and discharge instructions  Description: Complete learning assessment and assess knowledge base.  Interventions:  - Provide teaching at level of understanding  - Provide teaching via preferred learning methods  4/5/2024 0020 by Dena Lang RN  Outcome: Progressing  4/4/2024 1715 by Dena Lang RN  Outcome: Progressing     Problem: DISCHARGE PLANNING  Goal: Discharge to home or other facility with appropriate resources  Description: INTERVENTIONS:  - Identify barriers to discharge w/patient and caregiver  - Arrange for needed discharge resources and transportation as appropriate  - Identify discharge learning needs (meds, wound care, etc.)  - Arrange for interpretive services to assist at discharge as needed  - Refer to Case Management Department for coordinating discharge planning if the patient needs post-hospital services based on physician/advanced practitioner order or complex needs related to functional status, cognitive ability, or social support system  4/5/2024 0020 by Dena Lang RN  Outcome: Progressing  4/4/2024 1715 by Dena Lang RN  Outcome: Progressing

## 2024-04-05 NOTE — PROGRESS NOTES
"Post Op Check - OB/GYN  Mela Perez 28 y.o. female MRN: [id number]  Unit/Bed#: -01 Encounter: [id number]      Subjective:  Mela Perez is doing well post-operatively from a  delivery this afternoon. Baby is in the room. Patient reports nausea with eating, however she is tolerating PO. She is passing gas. Youssef remains in place with inadequate urine output. Patient is ambulating.     Vitals:   /75   Pulse 83   Temp 98.4 °F (36.9 °C)   Resp 18   Ht 5' 3\" (1.6 m)   Wt 101 kg (223 lb)   LMP 07/10/2023   SpO2 98%   Breastfeeding Yes   BMI 39.50 kg/m²     Physical Exam:   GEN: Mela Perez appears well, alert and oriented x 3, pleasant and cooperative   ABDOMEN: soft, no tenderness, no distention, fundus firm, Incision C/D/I  EXTREMITIES: SCDs on    Plan:  - maintain youssef until adequate UOP  - encourage ambulation  - regular diet as tolerated  - maintain SCDs and encourage incentive spirometry    Ann Whalen MD  2024  11:26 PM       "

## 2024-04-05 NOTE — PROGRESS NOTES
Progress Note - OB/GYN  Mela Perez 28 y.o. female MRN: [id number]  Unit/Bed#:  417-01 Encounter: [id number]    Assessment and Plan:  Mela Perez is a patient of: [facility]. She is PPD# 1 s/p  primary  section, low transverse incision for breech presentations with contractions. Recovering well and is stable.  By issue:    * 38 weeks gestation of pregnancy  Assessment & Plan   mL  Hgb 11.5 -> post op Hgb  Pain well controlled  Youssef catheter in place w/ adequate UOP s/p 2L IVF overnight (UOP 0.49 cc/kg/hr); nurse to remove youssef  Appropriate bowel function  Tolerating PO fluids and solids  Breastfeeding  Lochia wnl  Continue routine postpartum care  DVT ppx: SCDs, Lovenox 40 mg qd (BMI 39)  Follow up void trial    Sterilization  Assessment & Plan  S/p bilateral salpingectomy at time of delivery    Thrombocytopenia affecting pregnancy (HCC)  Assessment & Plan  Plt prior to admission 120k  Plt on admission 108k  Likely gestational thrombocytopenia    Prior pregnancy complicated by PIH, antepartum, third trimester  Assessment & Plan  Hx of HTN in prior pregnancy    Current duncan pregnancy with history of congenital heart disease in prior child, antepartum  Assessment & Plan  Hx of ASD/VSD in her oldest child  No complications this pregnancy.   Echo completed last scan reviewed - 12/15/23 normal        Disposition    - Anticipate discharge home on PPD# 2-3      Subjective/Objective     Chief Complaint: Postpartum State     Subjective:    Mela Perez is PPD/POD#1 s/p  primary  section, low transverse incision for breech presentation with contractions. She has no current complaints.  Pain is well controlled.  Patient is not currently voiding as her youssef remains in place (nurse to remove this AM now that her UOP is adequate at 0.49 cc/kg/hr).  She is ambulating.  Patient is currently passing flatus and has had no bowel movement. She is tolerating PO,  "and denies nausea or vomitting. Patient denies fever, chills, chest pain, shortness of breath, or calf tenderness. Lochia is normal. She is  Breastfeeding. She is recovering well and is stable.       Vitals:   /77 (BP Location: Left arm)   Pulse 98   Temp 98 °F (36.7 °C) (Oral)   Resp 16   Ht 5' 3\" (1.6 m)   Wt 101 kg (223 lb)   LMP 07/10/2023   SpO2 99%   Breastfeeding Yes   BMI 39.50 kg/m²       Intake/Output Summary (Last 24 hours) at 4/5/2024 0642  Last data filed at 4/5/2024 0411  Gross per 24 hour   Intake 900 ml   Output 377 ml   Net 523 ml       Invasive Devices       Peripheral Intravenous Line  Duration             Peripheral IV 04/04/24 Distal;Dorsal (posterior);Left Forearm <1 day              Drain  Duration             Urethral Catheter Double-lumen 16 Fr. <1 day                    Physical Exam:   GEN: Mela Perez appears well, alert and oriented x 3, pleasant and cooperative   CARDIO: warm and well perfused  RESP: non-labored breathing  ABDOMEN: soft, no tenderness, no distention, fundus @ U -1, Incision C/D/I  EXTREMITIES: SCDs on, non tender, no erythema, b/l Jd's sign negative      Labs:     Hemoglobin   Date Value Ref Range Status   04/04/2024 11.5 11.5 - 15.4 g/dL Final   01/19/2024 9.7 (L) 11.5 - 15.4 g/dL Final     WBC   Date Value Ref Range Status   04/04/2024 10.11 4.31 - 10.16 Thousand/uL Final   01/19/2024 8.60 4.31 - 10.16 Thousand/uL Final     Platelets   Date Value Ref Range Status   04/04/2024 108 (L) 149 - 390 Thousands/uL Final   01/19/2024 120 (L) 149 - 390 Thousands/uL Final     Creatinine   Date Value Ref Range Status   04/05/2024 0.74 0.60 - 1.30 mg/dL Final     Comment:     Standardized to IDMS reference method   10/02/2023 0.65 0.60 - 1.30 mg/dL Final     Comment:     Standardized to IDMS reference method     AST   Date Value Ref Range Status   04/05/2024 24 13 - 39 U/L Final   10/02/2023 12 (L) 13 - 39 U/L Final     ALT   Date Value Ref Range " Status   04/05/2024 13 7 - 52 U/L Final     Comment:     Specimen collection should occur prior to Sulfasalazine administration due to the potential for falsely depressed results.    10/02/2023 8 7 - 52 U/L Final     Comment:     Specimen collection should occur prior to Sulfasalazine administration due to the potential for falsely depressed results.           Amina Chavarria MD  4/5/2024  6:42 AM

## 2024-04-05 NOTE — UTILIZATION REVIEW
"NOTIFICATION OF INPATIENT ADMISSION   MATERNITY/DELIVERY AUTHORIZATION REQUEST   SERVICING FACILITY:   [facility]  Parent Child Health - L&D, [place], NICU  [address] [place] [address]  Tax ID: [id number]  NPI: [id number] ATTENDING PROVIDER:  Attending Name and NPI#: Elizabeth Malik Md [[id number]]  Address: [address] [place] [address]  Phone: [phone]     ADMISSION INFORMATION:  Place of Service: Inpatient [facility]  Place of Service Code: 21  Inpatient Admission Date/Time: 24 12:02 PM  Discharge Date/Time: No discharge date for patient encounter.  Admitting Diagnosis Code/Description:  Premature uterine contractions in third trimester, antepartum [O47.03]   Mother: Mela Perez 1995 Estimated Date of Delivery: 4/15/24  Delivering clinician:     OB History          3    Para   3    Term   3       0    AB   0    Living   3         SAB   0    IAB   0    Ectopic   0    Multiple   0    Live Births   3                Name & MRN:   Information for the patient's :  Chanda Perez (Mela) [[id number]]    Delivery Information:  Sex: female  Delivered 2024 1:33 PM by , Low Transverse; Gestational Age: 38w3d     Measurements:  Weight: 8 lb 6.4 oz (3810 g);  Height: 21\"    APGAR 1 minute 5 minutes 10 minutes   Totals: 8 9       UTILIZATION REVIEW CONTACT:  Coreen Galloway, Utilization   Network Utilization Review Department  Phone: [phone]  Fax [phone]  Email: [email or web address]  Contact for approvals/pending authorizations, clinical reviews, and discharge.   PHYSICIAN ADVISORY SERVICES:  Medical Necessity Denial & Ekuj-ql-Ybqm Review  Phone: [phone]  Fax: [phone]  Email: [email or web address]   DISCHARGE SUPPORT TEAM:  For Patients Discharge Needs & Updates  Phone: [phone] opt. 2 Fax: [phone]  Email: " [email or web address]

## 2024-04-05 NOTE — LACTATION NOTE
This note was copied from a baby's chart.    Mom nursing baby well. Denies need for assistance at this time. Dad remains supportive at bedside.     04/05/24 1045   Maternal Information   Has mother  before? Yes   LATCH Documentation   Having latch problems? No   Breasts/Nipples   Intervention Other (comment)  (Encouraged Lactation support)   Breastfeeding Status Yes   Breastfeeding Progress Not yet established   Breast Pump   Pump 3  (has Spectra S1 @ Home from Insurance)   Patient Follow-Up   Lactation Consult Status 2   Follow-Up Type Inpatient;Call as needed   Other OB Lactation Documentation    Additional Problem Noted Experienced Mom states nursing well  (Booklets @ bedside)       Encouraged parents to call for assistance, questions, and concerns about breastfeeding.  Extension provided.

## 2024-04-05 NOTE — PLAN OF CARE
Problem: PAIN - ADULT  Goal: Verbalizes/displays adequate comfort level or baseline comfort level  Description: Interventions:  - Encourage patient to monitor pain and request assistance  - Assess pain using appropriate pain scale  - Administer analgesics based on type and severity of pain and evaluate response  - Implement non-pharmacological measures as appropriate and evaluate response  - Consider cultural and social influences on pain and pain management  - Notify physician/advanced practitioner if interventions unsuccessful or patient reports new pain  Outcome: Progressing     Problem: INFECTION - ADULT  Goal: Absence or prevention of progression during hospitalization  Description: INTERVENTIONS:  - Assess and monitor for signs and symptoms of infection  - Monitor lab/diagnostic results  - Monitor all insertion sites, i.e. indwelling lines, tubes, and drains  - Monitor endotracheal if appropriate and nasal secretions for changes in amount and color  - [place] appropriate cooling/warming therapies per order  - Administer medications as ordered  - Instruct and encourage patient and family to use good hand hygiene technique  - Identify and instruct in appropriate isolation precautions for identified infection/condition  Outcome: Progressing  Goal: Absence of fever/infection during neutropenic period  Description: INTERVENTIONS:  - Monitor WBC    Outcome: Progressing     Problem: SAFETY ADULT  Goal: Patient will remain free of falls  Description: INTERVENTIONS:  - Educate patient/family on patient safety including physical limitations  - Instruct patient to call for assistance with activity   - Consult OT/PT to assist with strengthening/mobility   - Keep Call bell within reach  - Keep bed low and locked with side rails adjusted as appropriate  - Keep care items and personal belongings within reach  - Initiate and maintain comfort rounds  - Make Fall Risk Sign visible to staff  - Offer Toileting every  Hours,  in advance of need  - Initiate/Maintain alarm  - Obtain necessary fall risk management equipment:   - Apply yellow socks and bracelet for high fall risk patients  - Consider moving patient to room near nurses station  Outcome: Progressing  Goal: Maintain or return to baseline ADL function  Description: INTERVENTIONS:  -  Assess patient's ability to carry out ADLs; assess patient's baseline for ADL function and identify physical deficits which impact ability to perform ADLs (bathing, care of mouth/teeth, toileting, grooming, dressing, etc.)  - Assess/evaluate cause of self-care deficits   - Assess range of motion  - Assess patient's mobility; develop plan if impaired  - Assess patient's need for assistive devices and provide as appropriate  - Encourage maximum independence but intervene and supervise when necessary  - Involve family in performance of ADLs  - Assess for home care needs following discharge   - Consider OT consult to assist with ADL evaluation and planning for discharge  - Provide patient education as appropriate  Outcome: Progressing  Goal: Maintains/Returns to pre admission functional level  Description: INTERVENTIONS:  - Perform AM-PAC 6 Click Basic Mobility/ Daily Activity assessment daily.  - Set and communicate daily mobility goal to care team and patient/family/caregiver.   - Collaborate with rehabilitation services on mobility goals if consulted  - Perform Range of Motion  times a day.  - Reposition patient every  hours.  - Dangle patient  times a day  - Stand patient  times a day  - Ambulate patient  times a day  - Out of bed to chair  times a day   - Out of bed for meals  times a day  - Out of bed for toileting  - Record patient progress and toleration of activity level   Outcome: Progressing     Problem: Knowledge Deficit  Goal: Patient/family/caregiver demonstrates understanding of disease process, treatment plan, medications, and discharge instructions  Description: Complete learning  assessment and assess knowledge base.  Interventions:  - Provide teaching at level of understanding  - Provide teaching via preferred learning methods  Outcome: Progressing     Problem: DISCHARGE PLANNING  Goal: Discharge to home or other facility with appropriate resources  Description: INTERVENTIONS:  - Identify barriers to discharge w/patient and caregiver  - Arrange for needed discharge resources and transportation as appropriate  - Identify discharge learning needs (meds, wound care, etc.)  - Arrange for interpretive services to assist at discharge as needed  - Refer to Case Management Department for coordinating discharge planning if the patient needs post-hospital services based on physician/advanced practitioner order or complex needs related to functional status, cognitive ability, or social support system  Outcome: Progressing

## 2024-04-06 VITALS
WEIGHT: 223 LBS | OXYGEN SATURATION: 98 % | SYSTOLIC BLOOD PRESSURE: 110 MMHG | DIASTOLIC BLOOD PRESSURE: 77 MMHG | TEMPERATURE: 97.2 F | RESPIRATION RATE: 16 BRPM | HEIGHT: 63 IN | HEART RATE: 88 BPM | BODY MASS INDEX: 39.51 KG/M2

## 2024-04-06 PROBLEM — Z98.891 STATUS POST PRIMARY LOW TRANSVERSE CESAREAN SECTION: Status: ACTIVE | Noted: 2023-10-07

## 2024-04-06 PROCEDURE — NC001 PR NO CHARGE: Performed by: OBSTETRICS & GYNECOLOGY

## 2024-04-06 PROCEDURE — 99024 POSTOP FOLLOW-UP VISIT: CPT | Performed by: OBSTETRICS & GYNECOLOGY

## 2024-04-06 RX ORDER — IBUPROFEN 600 MG/1
600 TABLET ORAL EVERY 6 HOURS PRN
Qty: 40 TABLET | Refills: 1 | Status: SHIPPED | OUTPATIENT
Start: 2024-04-06

## 2024-04-06 RX ORDER — ACETAMINOPHEN 325 MG/1
650 TABLET ORAL EVERY 6 HOURS PRN
Qty: 40 TABLET | Refills: 1 | Status: SHIPPED | OUTPATIENT
Start: 2024-04-06

## 2024-04-06 RX ORDER — OXYCODONE HYDROCHLORIDE 5 MG/1
5 TABLET ORAL EVERY 4 HOURS PRN
Qty: 5 TABLET | Refills: 0 | Status: SHIPPED | OUTPATIENT
Start: 2024-04-06 | End: 2024-04-16

## 2024-04-06 RX ADMIN — ACETAMINOPHEN 325MG 650 MG: 325 TABLET ORAL at 00:38

## 2024-04-06 RX ADMIN — IBUPROFEN 600 MG: 600 TABLET ORAL at 00:38

## 2024-04-06 RX ADMIN — DOCUSATE SODIUM 100 MG: 100 CAPSULE, LIQUID FILLED ORAL at 08:46

## 2024-04-06 RX ADMIN — IBUPROFEN 600 MG: 600 TABLET ORAL at 06:27

## 2024-04-06 RX ADMIN — ACETAMINOPHEN 325MG 650 MG: 325 TABLET ORAL at 06:26

## 2024-04-06 RX ADMIN — ACETAMINOPHEN 325MG 650 MG: 325 TABLET ORAL at 11:58

## 2024-04-06 RX ADMIN — ENOXAPARIN SODIUM 40 MG: 40 INJECTION SUBCUTANEOUS at 08:46

## 2024-04-06 RX ADMIN — IBUPROFEN 600 MG: 600 TABLET ORAL at 11:58

## 2024-04-06 NOTE — PLAN OF CARE
Problem: PAIN - ADULT  Goal: Verbalizes/displays adequate comfort level or baseline comfort level  Description: Interventions:  - Encourage patient to monitor pain and request assistance  - Assess pain using appropriate pain scale  - Administer analgesics based on type and severity of pain and evaluate response  - Implement non-pharmacological measures as appropriate and evaluate response  - Consider cultural and social influences on pain and pain management  - Notify physician/advanced practitioner if interventions unsuccessful or patient reports new pain  Outcome: Progressing     Problem: INFECTION - ADULT  Goal: Absence or prevention of progression during hospitalization  Description: INTERVENTIONS:  - Assess and monitor for signs and symptoms of infection  - Monitor lab/diagnostic results  - Monitor all insertion sites, i.e. indwelling lines, tubes, and drains  - Monitor endotracheal if appropriate and nasal secretions for changes in amount and color  - [place] appropriate cooling/warming therapies per order  - Administer medications as ordered  - Instruct and encourage patient and family to use good hand hygiene technique  - Identify and instruct in appropriate isolation precautions for identified infection/condition  Outcome: Progressing  Goal: Absence of fever/infection during neutropenic period  Description: INTERVENTIONS:  - Monitor WBC    Outcome: Progressing     Problem: SAFETY ADULT  Goal: Patient will remain free of falls  Description: INTERVENTIONS:  - Educate patient/family on patient safety including physical limitations  - Instruct patient to call for assistance with activity   - Consult OT/PT to assist with strengthening/mobility   - Keep Call bell within reach  - Keep bed low and locked with side rails adjusted as appropriate  - Keep care items and personal belongings within reach  - Initiate and maintain comfort rounds  - Make Fall Risk Sign visible to staff    - Apply yellow socks and  bracelet for high fall risk patients  - Consider moving patient to room near nurses station  Outcome: Progressing  Goal: Maintain or return to baseline ADL function  Description: INTERVENTIONS:  -  Assess patient's ability to carry out ADLs; assess patient's baseline for ADL function and identify physical deficits which impact ability to perform ADLs (bathing, care of mouth/teeth, toileting, grooming, dressing, etc.)  - Assess/evaluate cause of self-care deficits   - Assess range of motion  - Assess patient's mobility; develop plan if impaired  - Assess patient's need for assistive devices and provide as appropriate  - Encourage maximum independence but intervene and supervise when necessary  - Involve family in performance of ADLs  - Assess for home care needs following discharge   - Consider OT consult to assist with ADL evaluation and planning for discharge  - Provide patient education as appropriate  Outcome: Progressing  Goal: Maintains/Returns to pre admission functional level  Description: INTERVENTIONS:  - Perform AM-PAC 6 Click Basic Mobility/ Daily Activity assessment daily.  - Set and communicate daily mobility goal to care team and patient/family/caregiver.   - Collaborate with rehabilitation services on mobility goals if consulted        - Out of bed for toileting  - Record patient progress and toleration of activity level   Outcome: Progressing     Problem: Knowledge Deficit  Goal: Patient/family/caregiver demonstrates understanding of disease process, treatment plan, medications, and discharge instructions  Description: Complete learning assessment and assess knowledge base.  Interventions:  - Provide teaching at level of understanding  - Provide teaching via preferred learning methods  Outcome: Progressing     Problem: DISCHARGE PLANNING  Goal: Discharge to home or other facility with appropriate resources  Description: INTERVENTIONS:  - Identify barriers to discharge w/patient and caregiver  -  Arrange for needed discharge resources and transportation as appropriate  - Identify discharge learning needs (meds, wound care, etc.)  - Arrange for interpretive services to assist at discharge as needed  - Refer to Case Management Department for coordinating discharge planning if the patient needs post-hospital services based on physician/advanced practitioner order or complex needs related to functional status, cognitive ability, or social support system  Outcome: Progressing

## 2024-04-06 NOTE — PROGRESS NOTES
Progress Note - OB/GYN  Mela Perez 28 y.o. female MRN: [id number]  Unit/Bed#:  417-01 Encounter: [id number]    Assessment and Plan:  Mela Perez is a patient of: [facility]. She is PPD# 2 s/p  primary  section, low transverse incision and bilateral salpingectomy for breech presentation.  Recovering well and is stable.  By issue:    * Status post primary low transverse  section  Assessment & Plan   mL  Hgb 11.5 -> 9.9 -> Venofer  Pain well controlled  Voiding spontaneously  Appropriate bowel function  Tolerating PO fluids and solids  Breastfeeding  Lochia wnl  Continue routine postpartum care  DVT ppx: SCDs, Lovenox 40 mg qd (BMI 39)    Sterilization  Assessment & Plan  S/p bilateral salpingectomy at time of delivery    Thrombocytopenia affecting pregnancy (HCC)  Assessment & Plan  Plt prior to admission 120k  Plt on admission 108k  Likely gestational thrombocytopenia    Prior pregnancy complicated by PIH, antepartum, third trimester  Assessment & Plan  Hx of HTN in prior pregnancy    Current duncan pregnancy with history of congenital heart disease in prior child, antepartum  Assessment & Plan  Hx of ASD/VSD in her oldest child  No complications this pregnancy.   Echo completed last scan reviewed - 12/15/23 normal        Disposition    - Anticipate discharge home on PPD# 2      Subjective/Objective     Chief Complaint: Postpartum State     Subjective:    Mela Perez is PPD/POD#2 s/p   primary  section, low transverse incision and bilateral salpingectomy for breech presentation . She has no current complaints.  Pain is well controlled.  Patient is currently voiding.  She is ambulating.  Patient is currently passing flatus and has had bowel movement. She is tolerating PO, and denies nausea or vomitting. Patient denies fever, chills, chest pain, shortness of breath, or calf tenderness. Lochia is normal. She is  Breastfeeding. She is  "recovering well and is stable.       Vitals:   BP 98/79 (BP Location: Right arm)   Pulse 91   Temp 98 °F (36.7 °C) (Oral)   Resp 20   Ht 5' 3\" (1.6 m)   Wt 101 kg (223 lb)   LMP 07/10/2023   SpO2 96%   Breastfeeding Yes   BMI 39.50 kg/m²     No intake or output data in the 24 hours ending 04/06/24 0722    Invasive Devices       Peripheral Intravenous Line  Duration             Peripheral IV 04/04/24 Distal;Dorsal (posterior);Left Forearm 1 day                    Physical Exam:   GEN: Mela Perez appears well, alert and oriented x 3, pleasant and cooperative   CARDIO: warm and well perfused  RESP: non-labored breathing  ABDOMEN: soft, no tenderness, no distention, fundus @ U -3, Incision C/D/I  EXTREMITIES: SCDs on, non tender, no erythema, b/l Jd's sign negative      Labs:     Hemoglobin   Date Value Ref Range Status   04/05/2024 9.9 (L) 11.5 - 15.4 g/dL Final   04/04/2024 11.5 11.5 - 15.4 g/dL Final     WBC   Date Value Ref Range Status   04/05/2024 7.74 4.31 - 10.16 Thousand/uL Final   04/04/2024 10.11 4.31 - 10.16 Thousand/uL Final     Platelets   Date Value Ref Range Status   04/05/2024 80 (L) 149 - 390 Thousands/uL Final   04/04/2024 108 (L) 149 - 390 Thousands/uL Final     Creatinine   Date Value Ref Range Status   04/05/2024 0.74 0.60 - 1.30 mg/dL Final     Comment:     Standardized to IDMS reference method   10/02/2023 0.65 0.60 - 1.30 mg/dL Final     Comment:     Standardized to IDMS reference method     AST   Date Value Ref Range Status   04/05/2024 24 13 - 39 U/L Final   10/02/2023 12 (L) 13 - 39 U/L Final     ALT   Date Value Ref Range Status   04/05/2024 13 7 - 52 U/L Final     Comment:     Specimen collection should occur prior to Sulfasalazine administration due to the potential for falsely depressed results.    10/02/2023 8 7 - 52 U/L Final     Comment:     Specimen collection should occur prior to Sulfasalazine administration due to the potential for falsely depressed results. "           Amina Chavarria MD  4/6/2024  7:22 AM

## 2024-04-06 NOTE — LACTATION NOTE
This note was copied from a baby's chart.  Met with Mela who is being discharged to home with her baby boy today.     Mela said that breast feeding is going pretty well. Baby was latched on entering room and latch appeared a little shallow. She states that feeds have been a little painful.  Baby was in the cradle position, had her try baby in the cross cradle position and she reported a more comfortable latch.     Positioning and Latch reviewed:    1. Meet early feeding cues.  2  Position baby up at chest level using pillows for support .   3.Baby's ear, shoulder, and hip in alignment.( Belly to Belly ).  4. Bring baby to breast,not breast to baby ( no hunching over ).  5.Align nose to nipple and drag nipple down to chin to achieve a wide open mouth.   6. Chin deep into breast tissue, nipple aimed up towards baby's upper back palate and   cheeks touching breast.    Baby's cheeks should be nice and full with jaw line moving towards baby's ear. Should not see puckering or hear any clicking noises.    She states that the Discharge Breastfeeding Booklet was reviewed with her and she has no questions at this time. She also has the [facility] information for follow up breastfeeding support as needed.

## 2024-04-08 NOTE — UTILIZATION REVIEW
Mother and baby discharged on 04/06/2024     NOTIFICATION OF ADMISSION DISCHARGE   This is a Notification of Discharge from [facility]. Please be advised that this patient has been discharge from our facility. Below you will find the admission and discharge date and time including the patient’s disposition.   UTILIZATION REVIEW CONTACT:  Coreen Galloway  Utilization   Network Utilization Review Department  Phone: [phone] x carefully listen to the prompts. All voicemails are confidential.  Email: [email or web address]     ADMISSION INFORMATION  PRESENTATION DATE: 4/4/2024 10:55 AM  OBERVATION ADMISSION DATE:   INPATIENT ADMISSION DATE: 4/4/24 12:02 PM   DISCHARGE DATE: 4/6/2024  1:39 PM   DISPOSITION:Home/Self Care    Network Utilization Review Department  ATTENTION: Please call with any questions or concerns to [phone] and carefully listen to the prompts so that you are directed to the right person. All voicemails are confidential.   For Discharge needs, contact Care Management DC Support Team at [phone] opt. 2  Send all requests for admission clinical reviews, approved or denied determinations and any other requests to dedicated fax number below belonging to the campus where the patient is receiving treatment. List of dedicated fax numbers for the Facilities:  FACILITY NAME UR FAX NUMBER   ADMISSION DENIALS (Administrative/Medical Necessity) [phone]   DISCHARGE SUPPORT TEAM ([facility]) [phone]   PARENT CHILD HEALTH (Maternity/NICU/Pediatrics) [phone]   [facility] [phone]   [facility] [phone]   [facility] [phone]   [facility] [phone]   [facility] [phone]   [facility] [phone]   [facility] [phone]   [facility]  [facility] [phone]   [facility] [phone]   [facility] [phone]   [facility] [phone]   [facility] [phone]

## 2024-04-09 PROCEDURE — 88302 TISSUE EXAM BY PATHOLOGIST: CPT | Performed by: PATHOLOGY

## 2024-04-12 NOTE — OP NOTE
OPERATIVE REPORT  PATIENT NAME: Mela Perez    :  1995  MRN: [id number]  Pt Location: [facility] L&D OR ROOM 01    SURGERY DATE: 2024    Surgeons and Role:  Panel 1:     * Elizabeth Malik MD - Primary     * Amina Chavarria MD  Panel 2:     * Haven Jones MD - Primary    Preop Diagnosis:  * No pre-op diagnosis entered *    * No Diagnosis Codes entered *    Procedure(s) (LRB):   SECTION () (N/A)  LIGATION/COAGULATION TUBAL (Bilateral)    Specimen(s):  ID Type Source Tests Collected by Time Destination   1 :  Tissue Fallopian Tube, Left TISSUE EXAM Elizabeth Malik MD 2024 1350    2 :  Tissue Fallopian Tube, Right TISSUE EXAM Elizabeth Malik MD 2024 1350    A :  Cord Blood Cord BLOOD GAS, VENOUS, CORD, BLOOD GAS, ARTERIAL, CORD Elizabeth Malik MD 2024 1323    B :  Tissue (Placenta on Hold) OB Only Placenta PLACENTA IN STORAGE (Canceled) Elizabeth Malik MD 2024 1323        Surgical QBL:  No data recorded    Drains:  [REMOVED] Urethral Catheter Double-lumen 16 Fr. (Removed)   Site Assessment Clean;Skin intact 24 2300   Output (mL) 125 mL 24 0411   Number of days: 1       Anesthesia Type:   * No anesthesia type entered *    Operative Indications:  * No pre-op diagnosis entered *  Desires permanent sterilization       Operative Findings:  Normal appearing fallopian tubes bilatearlly    Complications:   None    Procedure and Technique:  Following closure of the hysterotomy, the right fallopian tube was grasped with dayanna clamp and excised with Enseal device from cornua to fimbriated end.  The specimen was handed off to pathology.  The procedure was repeated on the left.  The remainder of the case was completed with Dr. Malik.  See separate note.    I was present for all critical portions of the procedure.    Patient Disposition: Postpartum        SIGNATURE: Haven Jones MD  DATE: 2024  TIME: 2:55 PM

## 2024-05-01 ENCOUNTER — POSTPARTUM VISIT (OUTPATIENT)
Dept: OBGYN CLINIC | Facility: MEDICAL CENTER | Age: 29
End: 2024-05-01
Payer: COMMERCIAL

## 2024-05-01 VITALS
HEIGHT: 63 IN | SYSTOLIC BLOOD PRESSURE: 112 MMHG | BODY MASS INDEX: 34.68 KG/M2 | DIASTOLIC BLOOD PRESSURE: 74 MMHG | WEIGHT: 195.7 LBS

## 2024-05-01 DIAGNOSIS — B36.9 FUNGAL SKIN INFECTION: ICD-10-CM

## 2024-05-01 RX ORDER — FLUCONAZOLE 200 MG/1
200 TABLET ORAL DAILY
Qty: 7 TABLET | Refills: 0 | Status: SHIPPED | OUTPATIENT
Start: 2024-05-01 | End: 2024-05-09

## 2024-05-01 RX ORDER — CLOTRIMAZOLE AND BETAMETHASONE DIPROPIONATE 10; .64 MG/G; MG/G
CREAM TOPICAL 2 TIMES DAILY
Qty: 45 G | Refills: 0 | Status: SHIPPED | OUTPATIENT
Start: 2024-05-01 | End: 2024-05-09

## 2024-05-01 NOTE — ASSESSMENT & PLAN NOTE
- Normal postpartum exam, except fungal rash  - Depression Screen: negative  - Feeding: breastfeeding  - Psychosocial support: reports adequate support  - Patient Education: Postpartum resources including Pelvic Health PT and Baby & Me

## 2024-05-01 NOTE — PROGRESS NOTES
[facility] of [address], [place], [place]    Assessment/Plan:  Mela Perez is a 28 y.o.  who presents for routine postop care after  for breech and labor.    Fungal skin infection  - Candida rash around the incision with satellite lesions and crusty old skin glue. Adhesive remover was used to cleanse the skin. Recommend oral antifungal.    Postpartum care and examination  - Normal postpartum exam, except fungal rash  - Depression Screen: negative  - Feeding: breastfeeding  - Psychosocial support: reports adequate support  - Patient Education: Postpartum resources including Pelvic Health PT and Baby & Me    Diagnoses and all orders for this visit:    Postpartum care and examination    Fungal skin infection  -     fluconazole (DIFLUCAN) 200 mg tablet; Take 1 tablet (200 mg total) by mouth daily for 7 days  -     clotrimazole-betamethasone (LOTRISONE) 1-0.05 % cream; Apply topically 2 (two) times a day for 7 days          Subjective:   Mela Perez is a 28 y.o.  female.  CC: postpartum    HPI: Mela presents for routine postpartum care.  She is noting some pain and itching at the incision. She is breastfeeding. Reports good mood.        ROS: Review of Systems   Constitutional:  Negative for chills and fever.   Respiratory:  Negative for cough and shortness of breath.    Cardiovascular:  Negative for chest pain and leg swelling.   Gastrointestinal:  Negative for abdominal pain, nausea and vomiting.   Genitourinary:  Negative for dysuria, frequency and urgency.   Neurological:  Negative for dizziness, light-headedness and headaches.       PFSH: The following portions of the patient's history were reviewed and updated as appropriate: allergies, current medications, past family history, past medical history, obstetric history, gynecologic history, past social history, past surgical history and problem list.       Objective:  /74   Ht 5'  "3\" (1.6 m)   Wt 88.8 kg (195 lb 11.2 oz)   LMP 07/10/2023   Breastfeeding Yes   BMI 34.67 kg/m²    Physical Exam  Constitutional:       Appearance: Normal appearance.   HENT:      Head: Normocephalic and atraumatic.      Mouth/Throat:      Mouth: Mucous membranes are moist.      Pharynx: Oropharynx is clear. No oropharyngeal exudate.   Cardiovascular:      Rate and Rhythm: Normal rate.   Pulmonary:      Effort: Pulmonary effort is normal.   Abdominal:      General: Abdomen is flat. There is no distension.      Palpations: Abdomen is soft. There is no mass.      Tenderness: There is no abdominal tenderness.      Hernia: No hernia is present.      Comments: Incision is clean, dry, intact, and well healing.  There is no induration, fluctuance, or drainage.  Candida rash.   Skin:     General: Skin is warm and dry.   Neurological:      General: No focal deficit present.      Mental Status: She is alert and oriented to person, place, and time.   Psychiatric:         Mood and Affect: Mood normal.         Behavior: Behavior normal.           Brandy Eugene MD  [facility]  [facility]  5/1/2024 4:41 PM    "

## 2024-05-01 NOTE — ASSESSMENT & PLAN NOTE
- Candida rash around the incision with satellite lesions and crusty old skin glue. Adhesive remover was used to cleanse the skin. Recommend oral antifungal.

## 2024-05-01 NOTE — LETTER
May 1, 2024     Patient: Mela Perez  YOB: 1995  Date of Visit: 2024      To Whom it May Concern:    Mela Perez is under my professional care. Mela was seen in my office on 2024. Mela was delivered by  on 2024.  Please extend her  Miguel Perez's leave to cover her 8 weeks of recovery, return to work on Wednesday, 2024.    If you have any questions or concerns, please don't hesitate to call.         Sincerely,          Brandy Eugene MD        CC: No Recipients

## 2024-05-09 ENCOUNTER — OFFICE VISIT (OUTPATIENT)
Dept: OBGYN CLINIC | Facility: MEDICAL CENTER | Age: 29
End: 2024-05-09

## 2024-05-09 VITALS
DIASTOLIC BLOOD PRESSURE: 70 MMHG | BODY MASS INDEX: 34.34 KG/M2 | SYSTOLIC BLOOD PRESSURE: 98 MMHG | WEIGHT: 193.8 LBS | HEIGHT: 63 IN

## 2024-05-09 DIAGNOSIS — Z98.891 STATUS POST PRIMARY LOW TRANSVERSE CESAREAN SECTION: Primary | ICD-10-CM

## 2024-05-09 PROCEDURE — 99024 POSTOP FOLLOW-UP VISIT: CPT | Performed by: STUDENT IN AN ORGANIZED HEALTH CARE EDUCATION/TRAINING PROGRAM

## 2024-05-09 NOTE — PROGRESS NOTES
"[facility] of [address], [place], [place]    Assessment/Plan:  Mela Perez is a 28 y.o.  who follows up for fungal infection of  wound area, now resolved.    No problem-specific Assessment & Plan notes found for this encounter.    Diagnoses and all orders for this visit:    Status post primary low transverse  section          Subjective:   Mela Perez is a 28 y.o.  female.  CC: postop check/ follow up    HPI: Mela follows up for a fungal intertrigo of her  wound area. She noted much improvement since starting antifungal treatment.        ROS: Review of Systems   Constitutional:  Negative for chills and fever.   Respiratory:  Negative for cough and shortness of breath.    Cardiovascular:  Negative for chest pain and leg swelling.   Gastrointestinal:  Negative for abdominal pain, nausea and vomiting.   Genitourinary:  Negative for dysuria, frequency and urgency.   Neurological:  Negative for dizziness, light-headedness and headaches.       PFSH: The following portions of the patient's history were reviewed and updated as appropriate: allergies, current medications, past family history, past medical history, obstetric history, gynecologic history, past social history, past surgical history and problem list.       Objective:  BP 98/70   Ht 5' 3\" (1.6 m)   Wt 87.9 kg (193 lb 12.8 oz)   LMP 07/10/2023   BMI 34.33 kg/m²    Physical Exam  Constitutional:       Appearance: Normal appearance.   HENT:      Head: Normocephalic and atraumatic.      Mouth/Throat:      Mouth: Mucous membranes are moist.      Pharynx: Oropharynx is clear. No oropharyngeal exudate.   Cardiovascular:      Rate and Rhythm: Normal rate.   Pulmonary:      Effort: Pulmonary effort is normal.   Abdominal:      General: Abdomen is flat. There is no distension.      Palpations: Abdomen is soft. There is no mass.      Tenderness: There is no abdominal " tenderness.      Hernia: No hernia is present.      Comments: Incision is clean, dry, intact, and well healing.  There is no erythema, induration, fluctuance, or drainage.   Skin:     General: Skin is warm and dry.   Neurological:      General: No focal deficit present.      Mental Status: She is alert and oriented to person, place, and time.   Psychiatric:         Mood and Affect: Mood normal.         Behavior: Behavior normal.           Brandy Eugene MD  [facility]  [facility]  5/9/2024 4:53 PM

## 2024-06-30 ENCOUNTER — OFFICE VISIT (OUTPATIENT)
Dept: URGENT CARE | Age: 29
End: 2024-06-30
Payer: COMMERCIAL

## 2024-06-30 VITALS
DIASTOLIC BLOOD PRESSURE: 79 MMHG | HEIGHT: 63 IN | TEMPERATURE: 99.2 F | RESPIRATION RATE: 20 BRPM | SYSTOLIC BLOOD PRESSURE: 120 MMHG | BODY MASS INDEX: 34.02 KG/M2 | HEART RATE: 119 BPM | WEIGHT: 192 LBS | OXYGEN SATURATION: 96 %

## 2024-06-30 DIAGNOSIS — H61.21 IMPACTED CERUMEN OF RIGHT EAR: ICD-10-CM

## 2024-06-30 DIAGNOSIS — J02.9 ACUTE PHARYNGITIS, UNSPECIFIED ETIOLOGY: ICD-10-CM

## 2024-06-30 DIAGNOSIS — H66.91 RIGHT OTITIS MEDIA, UNSPECIFIED OTITIS MEDIA TYPE: Primary | ICD-10-CM

## 2024-06-30 PROCEDURE — 99213 OFFICE O/P EST LOW 20 MIN: CPT | Performed by: PHYSICIAN ASSISTANT

## 2024-06-30 RX ORDER — AMOXICILLIN 500 MG/1
500 CAPSULE ORAL EVERY 8 HOURS SCHEDULED
Qty: 30 CAPSULE | Refills: 0 | OUTPATIENT
Start: 2024-06-30 | End: 2024-07-01

## 2024-06-30 NOTE — PROGRESS NOTES
"[facility]        NAME: Mela Perez is a 28 y.o. female  : 1995    MRN: [id number]  DATE: 2024  TIME: 10:22 AM    /79   Pulse (!) 119   Temp 99.2 °F (37.3 °C)   Resp 20   Ht 5' 3\" (1.6 m)   Wt 87.1 kg (192 lb)   SpO2 96%   BMI 34.01 kg/m²     Assessment and Plan   Right otitis media, unspecified otitis media type [H66.91]  1. Right otitis media, unspecified otitis media type  amoxicillin (AMOXIL) 500 mg capsule      2. Acute pharyngitis, unspecified etiology  amoxicillin (AMOXIL) 500 mg capsule      3. Impacted cerumen of right ear              Patient Instructions       Follow up with PCP in 3-5 days.  Proceed to  ER if symptoms worsen.    Chief Complaint     Chief Complaint   Patient presents with    Earache     Right ear pain 3 days. HA and ST also. Currently breastfeeding         History of Present Illness       Pt with right ear pain and sore throat for several days         Review of Systems   Review of Systems   Constitutional: Negative.    HENT:  Positive for ear pain and sore throat.    Eyes: Negative.    Respiratory: Negative.     Cardiovascular: Negative.    Gastrointestinal: Negative.    Endocrine: Negative.    Genitourinary: Negative.    Musculoskeletal: Negative.    Skin: Negative.    Allergic/Immunologic: Negative.    Neurological: Negative.    Hematological: Negative.    Psychiatric/Behavioral: Negative.     All other systems reviewed and are negative.        Current Medications       Current Outpatient Medications:     amoxicillin (AMOXIL) 500 mg capsule, Take 1 capsule (500 mg total) by mouth every 8 (eight) hours for 10 days, Disp: 30 capsule, Rfl: 0    acetaminophen (TYLENOL) 325 mg tablet, Take 2 tablets (650 mg total) by mouth every 6 (six) hours as needed for mild pain, Disp: 40 tablet, Rfl: 1    clotrimazole-betamethasone (LOTRISONE) 1-0.05 % cream, Apply topically 2 (two) times a day for 7 days, Disp: 45 g, Rfl: 0    ferrous sulfate 324 (65 " Fe) mg, Take 1 tablet (324 mg total) by mouth every other day, Disp: 90 tablet, Rfl: 1    ibuprofen (MOTRIN) 600 mg tablet, Take 1 tablet (600 mg total) by mouth every 6 (six) hours as needed for moderate pain, Disp: 40 tablet, Rfl: 1    Prenatal Vit-Fe Fumarate-FA (PRENATAL 1+1 PO), Take 1 tablet by mouth, Disp: , Rfl:     Current Allergies     Allergies as of 2024 - Reviewed 2024   Allergen Reaction Noted    Pollen extract      Seasonal ic  [cholestatin]  2017            The following portions of the patient's history were reviewed and updated as appropriate: allergies, current medications, past family history, past medical history, past social history, past surgical history and problem list.     Past Medical History:   Diagnosis Date    Gestational hypertension, third trimester 2020    HPV (human papilloma virus) infection     Pap smear abnormality of cervix with LGSIL     Seasonal allergies     Varicella     vaccination       Past Surgical History:   Procedure Laterality Date    [place]  DELIVERY ONLY N/A 2024    Procedure:  SECTION ();  Surgeon: Elizabeth Malik MD;  Location: [facility];  Service: Obstetrics    TUBAL LIGATION Bilateral 2024    Procedure: LIGATION/COAGULATION TUBAL;  Surgeon: Haven Jones MD;  Location: [facility];  Service: Obstetrics    TYMPANOPLASTY      WISDOM TOOTH EXTRACTION         Family History   Problem Relation Age of Onset    No Known Problems Mother     Lung cancer Father     No Known Problems Sister     No Known Problems Brother     No Known Problems Son     No Known Problems Son     Dementia Maternal Grandmother     Lung cancer Maternal Grandfather     No Known Problems Paternal Grandmother     Diabetes Paternal Grandfather     No Known Problems Half-Sister     No Known Problems Half-Brother     Breast cancer Neg Hx     Colon cancer Neg Hx     Ovarian cancer Neg Hx          Medications have been verified.        Objective   /79  "  Pulse (!) 119   Temp 99.2 °F (37.3 °C)   Resp 20   Ht 5' 3\" (1.6 m)   Wt 87.1 kg (192 lb)   SpO2 96%   BMI 34.01 kg/m²        Physical Exam     Physical Exam  Vitals and nursing note reviewed.   Constitutional:       Appearance: Normal appearance. She is normal weight.      Comments: Debrox given  , bulb syringe given, discussed amoxil, and pumping and discarding breast milk while on amoxil    HENT:      Head: Normocephalic and atraumatic.      Right Ear: External ear normal. There is impacted cerumen.      Left Ear: Tympanic membrane, ear canal and external ear normal.      Ears:      Comments: Tight canal cerumen impaction          Mouth/Throat:      Mouth: Mucous membranes are moist.      Pharynx: Oropharynx is clear. Posterior oropharyngeal erythema present.   Eyes:      Extraocular Movements: Extraocular movements intact.      Conjunctiva/sclera: Conjunctivae normal.      Pupils: Pupils are equal, round, and reactive to light.   Cardiovascular:      Pulses: Normal pulses.      Heart sounds: Normal heart sounds.   Pulmonary:      Effort: Pulmonary effort is normal.      Breath sounds: Normal breath sounds.   Abdominal:      General: Abdomen is flat.      Palpations: Abdomen is soft.   Musculoskeletal:         General: Normal range of motion.      Cervical back: Normal range of motion and neck supple.   Lymphadenopathy:      Cervical: Cervical adenopathy present.   Skin:     General: Skin is warm.      Capillary Refill: Capillary refill takes less than 2 seconds.   Neurological:      Mental Status: She is alert and oriented to person, place, and time.   Psychiatric:         Mood and Affect: Mood normal.                     "

## 2024-07-01 ENCOUNTER — APPOINTMENT (EMERGENCY)
Dept: RADIOLOGY | Facility: HOSPITAL | Age: 29
End: 2024-07-01
Payer: COMMERCIAL

## 2024-07-01 ENCOUNTER — HOSPITAL ENCOUNTER (EMERGENCY)
Facility: HOSPITAL | Age: 29
Discharge: HOME | End: 2024-07-01
Attending: EMERGENCY MEDICINE | Admitting: EMERGENCY MEDICINE
Payer: COMMERCIAL

## 2024-07-01 VITALS
SYSTOLIC BLOOD PRESSURE: 106 MMHG | BODY MASS INDEX: 34.05 KG/M2 | WEIGHT: 192.24 LBS | DIASTOLIC BLOOD PRESSURE: 60 MMHG | TEMPERATURE: 99.7 F | HEART RATE: 103 BPM | RESPIRATION RATE: 20 BRPM | OXYGEN SATURATION: 97 %

## 2024-07-01 DIAGNOSIS — J02.9 PHARYNGITIS: Primary | ICD-10-CM

## 2024-07-01 DIAGNOSIS — R11.0 NAUSEA: ICD-10-CM

## 2024-07-01 DIAGNOSIS — R50.9 FEVER: ICD-10-CM

## 2024-07-01 LAB
ALBUMIN SERPL BCG-MCNC: 4.7 G/DL (ref 3.5–5)
ALP SERPL-CCNC: 88 U/L (ref 34–104)
ALT SERPL W P-5'-P-CCNC: 29 U/L (ref 7–52)
ANION GAP SERPL CALCULATED.3IONS-SCNC: 9 MMOL/L (ref 4–13)
APTT PPP: 37 SECONDS (ref 23–37)
AST SERPL W P-5'-P-CCNC: 21 U/L (ref 13–39)
BASOPHILS # BLD AUTO: 0.02 THOUSANDS/ÂΜL (ref 0–0.1)
BASOPHILS NFR BLD AUTO: 0 % (ref 0–1)
BILIRUB SERPL-MCNC: 0.48 MG/DL (ref 0.2–1)
BUN SERPL-MCNC: 14 MG/DL (ref 5–25)
CALCIUM SERPL-MCNC: 9.2 MG/DL (ref 8.4–10.2)
CHLORIDE SERPL-SCNC: 103 MMOL/L (ref 96–108)
CO2 SERPL-SCNC: 24 MMOL/L (ref 21–32)
CREAT SERPL-MCNC: 0.83 MG/DL (ref 0.6–1.3)
EOSINOPHIL # BLD AUTO: 0.01 THOUSAND/ÂΜL (ref 0–0.61)
EOSINOPHIL NFR BLD AUTO: 0 % (ref 0–6)
ERYTHROCYTE [DISTWIDTH] IN BLOOD BY AUTOMATED COUNT: 14.1 % (ref 11.6–15.1)
GFR SERPL CREATININE-BSD FRML MDRD: 94 ML/MIN/1.73SQ M
GLUCOSE SERPL-MCNC: 99 MG/DL (ref 65–140)
HCT VFR BLD AUTO: 38.8 % (ref 34.8–46.1)
HGB BLD-MCNC: 12.7 G/DL (ref 11.5–15.4)
IMM GRANULOCYTES # BLD AUTO: 0.02 THOUSAND/UL (ref 0–0.2)
IMM GRANULOCYTES NFR BLD AUTO: 0 % (ref 0–2)
INR PPP: 1.02 (ref 0.84–1.19)
LACTATE SERPL-SCNC: 0.8 MMOL/L (ref 0.5–2)
LYMPHOCYTES # BLD AUTO: 0.82 THOUSANDS/ÂΜL (ref 0.6–4.47)
LYMPHOCYTES NFR BLD AUTO: 12 % (ref 14–44)
MCH RBC QN AUTO: 30.2 PG (ref 26.8–34.3)
MCHC RBC AUTO-ENTMCNC: 32.7 G/DL (ref 31.4–37.4)
MCV RBC AUTO: 92 FL (ref 82–98)
MONOCYTES # BLD AUTO: 0.5 THOUSAND/ÂΜL (ref 0.17–1.22)
MONOCYTES NFR BLD AUTO: 7 % (ref 4–12)
NEUTROPHILS # BLD AUTO: 5.74 THOUSANDS/ÂΜL (ref 1.85–7.62)
NEUTS SEG NFR BLD AUTO: 81 % (ref 43–75)
NRBC BLD AUTO-RTO: 0 /100 WBCS
PLATELET # BLD AUTO: 141 THOUSANDS/UL (ref 149–390)
PMV BLD AUTO: 9.1 FL (ref 8.9–12.7)
POTASSIUM SERPL-SCNC: 3.8 MMOL/L (ref 3.5–5.3)
PROCALCITONIN SERPL-MCNC: 0.09 NG/ML
PROT SERPL-MCNC: 7.7 G/DL (ref 6.4–8.4)
PROTHROMBIN TIME: 13.6 SECONDS (ref 11.6–14.5)
RBC # BLD AUTO: 4.21 MILLION/UL (ref 3.81–5.12)
S PYO DNA THROAT QL NAA+PROBE: NOT DETECTED
SODIUM SERPL-SCNC: 136 MMOL/L (ref 135–147)
WBC # BLD AUTO: 7.11 THOUSAND/UL (ref 4.31–10.16)

## 2024-07-01 PROCEDURE — 80053 COMPREHEN METABOLIC PANEL: CPT | Performed by: EMERGENCY MEDICINE

## 2024-07-01 PROCEDURE — 99284 EMERGENCY DEPT VISIT MOD MDM: CPT

## 2024-07-01 PROCEDURE — 87040 BLOOD CULTURE FOR BACTERIA: CPT | Performed by: EMERGENCY MEDICINE

## 2024-07-01 PROCEDURE — 36415 COLL VENOUS BLD VENIPUNCTURE: CPT

## 2024-07-01 PROCEDURE — 96374 THER/PROPH/DIAG INJ IV PUSH: CPT

## 2024-07-01 PROCEDURE — 85730 THROMBOPLASTIN TIME PARTIAL: CPT | Performed by: EMERGENCY MEDICINE

## 2024-07-01 PROCEDURE — 85025 COMPLETE CBC W/AUTO DIFF WBC: CPT | Performed by: EMERGENCY MEDICINE

## 2024-07-01 PROCEDURE — 85610 PROTHROMBIN TIME: CPT | Performed by: EMERGENCY MEDICINE

## 2024-07-01 PROCEDURE — 71045 X-RAY EXAM CHEST 1 VIEW: CPT

## 2024-07-01 PROCEDURE — 99285 EMERGENCY DEPT VISIT HI MDM: CPT | Performed by: PHYSICIAN ASSISTANT

## 2024-07-01 PROCEDURE — 93005 ELECTROCARDIOGRAM TRACING: CPT

## 2024-07-01 PROCEDURE — 86308 HETEROPHILE ANTIBODY SCREEN: CPT | Performed by: PHYSICIAN ASSISTANT

## 2024-07-01 PROCEDURE — 96361 HYDRATE IV INFUSION ADD-ON: CPT

## 2024-07-01 PROCEDURE — 87651 STREP A DNA AMP PROBE: CPT | Performed by: PHYSICIAN ASSISTANT

## 2024-07-01 PROCEDURE — 83605 ASSAY OF LACTIC ACID: CPT | Performed by: EMERGENCY MEDICINE

## 2024-07-01 PROCEDURE — 84145 PROCALCITONIN (PCT): CPT | Performed by: EMERGENCY MEDICINE

## 2024-07-01 RX ORDER — KETOROLAC TROMETHAMINE 30 MG/ML
15 INJECTION, SOLUTION INTRAMUSCULAR; INTRAVENOUS ONCE
Status: COMPLETED | OUTPATIENT
Start: 2024-07-01 | End: 2024-07-01

## 2024-07-01 RX ORDER — CLINDAMYCIN HYDROCHLORIDE 150 MG/1
300 CAPSULE ORAL ONCE
Status: COMPLETED | OUTPATIENT
Start: 2024-07-01 | End: 2024-07-01

## 2024-07-01 RX ORDER — ONDANSETRON 4 MG/1
4 TABLET, ORALLY DISINTEGRATING ORAL EVERY 8 HOURS PRN
Qty: 20 TABLET | Refills: 0 | Status: SHIPPED | OUTPATIENT
Start: 2024-07-01

## 2024-07-01 RX ORDER — ACETAMINOPHEN 325 MG/1
650 TABLET ORAL ONCE
Status: COMPLETED | OUTPATIENT
Start: 2024-07-01 | End: 2024-07-01

## 2024-07-01 RX ORDER — CLINDAMYCIN HYDROCHLORIDE 300 MG/1
300 CAPSULE ORAL 4 TIMES DAILY
Qty: 40 CAPSULE | Refills: 0 | Status: SHIPPED | OUTPATIENT
Start: 2024-07-01 | End: 2024-07-11

## 2024-07-01 RX ORDER — ONDANSETRON 2 MG/ML
4 INJECTION INTRAMUSCULAR; INTRAVENOUS ONCE AS NEEDED
Status: DISCONTINUED | OUTPATIENT
Start: 2024-07-01 | End: 2024-07-02 | Stop reason: HOSPADM

## 2024-07-01 RX ADMIN — CLINDAMYCIN HYDROCHLORIDE 300 MG: 150 CAPSULE ORAL at 21:15

## 2024-07-01 RX ADMIN — SODIUM CHLORIDE 1000 ML: 0.9 INJECTION, SOLUTION INTRAVENOUS at 19:42

## 2024-07-01 RX ADMIN — KETOROLAC TROMETHAMINE 15 MG: 30 INJECTION, SOLUTION INTRAMUSCULAR; INTRAVENOUS at 19:37

## 2024-07-01 RX ADMIN — ACETAMINOPHEN 650 MG: 325 TABLET, FILM COATED ORAL at 18:24

## 2024-07-02 LAB
ATRIAL RATE: 125 BPM
HETEROPH AB SER QL: NEGATIVE
P AXIS: 58 DEGREES
PR INTERVAL: 126 MS
QRS AXIS: 73 DEGREES
QRSD INTERVAL: 72 MS
QT INTERVAL: 298 MS
QTC INTERVAL: 430 MS
T WAVE AXIS: 6 DEGREES
VENTRICULAR RATE: 125 BPM

## 2024-07-02 PROCEDURE — 93010 ELECTROCARDIOGRAM REPORT: CPT | Performed by: INTERNAL MEDICINE

## 2024-07-02 NOTE — ED PROVIDER NOTES
History  Chief Complaint   Patient presents with    Flu Symptoms     Pt reports fevers, R ear pain, body aches, chills, sore throat and cough starting x3 days.      Mela is a 28-year-old female presenting with 2-3 days of sore throat, fevers/chills, myalgias, intermittent nausea, and generalized fatigue.  She was evaluated in urgent care yesterday, started on amoxicillin which she has since taken several doses of.  She has had some intermittent pain in her right ear as well described as pressure.  Slight cough, nonproductive.  No known sick contacts or recent travel.  She is tolerating p.o. although notes pain with swallowing.  Of note, she is currently breast-feeding her infant.      History provided by:  Patient   used: No        Prior to Admission Medications   Prescriptions Last Dose Informant Patient Reported? Taking?   Prenatal Vit-Fe Fumarate-FA (PRENATAL 1+1 PO)  Self Yes No   Sig: Take 1 tablet by mouth   acetaminophen (TYLENOL) 325 mg tablet   No No   Sig: Take 2 tablets (650 mg total) by mouth every 6 (six) hours as needed for mild pain   amoxicillin (AMOXIL) 500 mg capsule   No No   Sig: Take 1 capsule (500 mg total) by mouth every 8 (eight) hours for 10 days   clotrimazole-betamethasone (LOTRISONE) 1-0.05 % cream   No No   Sig: Apply topically 2 (two) times a day for 7 days   ferrous sulfate 324 (65 Fe) mg   No No   Sig: Take 1 tablet (324 mg total) by mouth every other day   ibuprofen (MOTRIN) 600 mg tablet   No No   Sig: Take 1 tablet (600 mg total) by mouth every 6 (six) hours as needed for moderate pain      Facility-Administered Medications: None       Past Medical History:   Diagnosis Date    Gestational hypertension, third trimester 2020    HPV (human papilloma virus) infection     Pap smear abnormality of cervix with LGSIL     Seasonal allergies     Varicella     vaccination       Past Surgical History:   Procedure Laterality Date    MI  DELIVERY ONLY N/A  2024    Procedure:  SECTION ();  Surgeon: Elizabeth Malik MD;  Location: [facility];  Service: Obstetrics    TUBAL LIGATION Bilateral 2024    Procedure: LIGATION/COAGULATION TUBAL;  Surgeon: Haven Jones MD;  Location: [facility];  Service: Obstetrics    TYMPANOPLASTY      WISDOM TOOTH EXTRACTION         Family History   Problem Relation Age of Onset    No Known Problems Mother     Lung cancer Father     No Known Problems Sister     No Known Problems Brother     No Known Problems Son     No Known Problems Son     Dementia Maternal Grandmother     Lung cancer Maternal Grandfather     No Known Problems Paternal Grandmother     Diabetes Paternal Grandfather     No Known Problems Half-Sister     No Known Problems Half-Brother     Breast cancer Neg Hx     Colon cancer Neg Hx     Ovarian cancer Neg Hx      I have reviewed and agree with the history as documented.    E-Cigarette/Vaping    E-Cigarette Use Never User      E-Cigarette/Vaping Substances    Nicotine No     THC No     CBD No     Flavoring No     Other No     Unknown No      Social History     Tobacco Use    Smoking status: Never    Smokeless tobacco: Never   Vaping Use    Vaping status: Never Used   Substance Use Topics    Alcohol use: Not Currently     Comment: socially    Drug use: Never       Review of Systems   Constitutional:  Positive for chills and fever.   HENT:  Positive for ear pain and sore throat. Negative for congestion and rhinorrhea.    Eyes:  Negative for pain and visual disturbance.   Respiratory:  Positive for cough. Negative for shortness of breath and wheezing.    Cardiovascular:  Negative for chest pain and palpitations.   Gastrointestinal:  Positive for nausea. Negative for abdominal pain, diarrhea and vomiting.   Genitourinary:  Negative for dysuria, frequency and urgency.   Musculoskeletal:  Positive for myalgias. Negative for back pain, neck pain and neck stiffness.   Skin:  Negative for rash and wound.    Neurological:  Positive for headaches. Negative for dizziness, weakness, light-headedness and numbness.       Physical Exam  Physical Exam  Vitals and nursing note reviewed.   Constitutional:       General: She is not in acute distress.     Appearance: She is well-developed. She is ill-appearing. She is not toxic-appearing or diaphoretic.   HENT:      Head: Normocephalic and atraumatic.      Right Ear: External ear normal. There is impacted cerumen.      Left Ear: External ear normal. There is impacted cerumen.      Ears:      Comments: Bilateral cerumen impactions.  Visualized portions of TMs are within normal limits.     Nose: Nose normal.      Mouth/Throat:      Pharynx: Uvula midline. Posterior oropharyngeal erythema present.      Tonsils: Tonsillar exudate present. 2+ on the right. 2+ on the left.      Comments: 2+ bilateral tonsils with exudate.  Posterior pharyngeal erythema.  Uvula is midline.  Eyes:      General:         Right eye: No discharge.         Left eye: No discharge.      Extraocular Movements: EOM normal.      Conjunctiva/sclera: Conjunctivae normal.      Pupils: Pupils are equal, round, and reactive to light.   Neck:      Comments: Tender bilateral anterior cervical lymphadenopathy.  Cardiovascular:      Rate and Rhythm: Regular rhythm. Tachycardia present.      Heart sounds: Normal heart sounds. No murmur heard.     No friction rub. No gallop.   Pulmonary:      Effort: Pulmonary effort is normal. No respiratory distress.      Breath sounds: Normal breath sounds. No stridor. No wheezing or rales.   Abdominal:      General: Bowel sounds are normal. There is no distension.      Palpations: Abdomen is soft.      Tenderness: There is no abdominal tenderness. There is no guarding.   Musculoskeletal:      Cervical back: Normal range of motion and neck supple.   Lymphadenopathy:      Cervical: Cervical adenopathy present.   Skin:     General: Skin is warm and dry.      Capillary Refill: Capillary  refill takes less than 2 seconds.      Coloration: Skin is not pale.      Findings: No erythema or rash.   Neurological:      Mental Status: She is alert and oriented to person, place, and time.      Motor: No abnormal muscle tone.      Coordination: Coordination normal.   Psychiatric:         Mood and Affect: Mood and affect normal.         Behavior: Behavior normal.         Thought Content: Thought content normal.         Judgment: Judgment normal.         Vital Signs  ED Triage Vitals   Temperature Pulse Respirations Blood Pressure SpO2   07/01/24 1743 07/01/24 1743 07/01/24 1743 07/01/24 1743 07/01/24 1743   (!) 100.9 °F (38.3 °C) (!) 141 18 131/80 100 %      Temp Source Heart Rate Source Patient Position - Orthostatic VS BP Location FiO2 (%)   07/01/24 1743 07/01/24 1743 07/01/24 1743 07/01/24 1743 --   Oral Monitor Sitting Right arm       Pain Score       07/01/24 1824       8           Vitals:    07/01/24 1743 07/01/24 1845 07/01/24 1951 07/01/24 2115   BP: 131/80  97/60 106/60   Pulse: (!) 141 (!) 126 (!) 112 103   Patient Position - Orthostatic VS: Sitting Sitting Lying Lying         Visual Acuity      ED Medications  Medications   ondansetron (ZOFRAN) injection 4 mg (has no administration in time range)   acetaminophen (TYLENOL) tablet 650 mg (650 mg Oral Given 7/1/24 1824)   ketorolac (TORADOL) injection 15 mg (15 mg Intravenous Given 7/1/24 1937)   sodium chloride 0.9 % bolus 1,000 mL (0 mL Intravenous Stopped 7/1/24 2115)   clindamycin (CLEOCIN) capsule 300 mg (300 mg Oral Given 7/1/24 2115)       Diagnostic Studies  Results Reviewed       Procedure Component Value Units Date/Time    Blood culture #2 [[id number]] Collected: 07/01/24 1818    Lab Status: Preliminary result Specimen: Blood from Arm, Right Updated: 07/01/24 2301     Blood Culture Received in Microbiology Lab. Culture in Progress.    Strep A PCR [[id number]]  (Normal) Collected: 07/01/24 1910    Lab Status: Final result Specimen: Throat  Updated: 07/01/24 2004     STREP A PCR Not Detected    Blood culture #1 [[id number]] Collected: 07/01/24 1951    Lab Status: In process Specimen: Blood from Hand, Right Updated: 07/01/24 2000    Mononucleosis screen [[id number]] Collected: 07/01/24 1917    Lab Status: In process Specimen: Blood from Arm, Right Updated: 07/01/24 1920    Procalcitonin [[id number]]  (Normal) Collected: 07/01/24 1818    Lab Status: Final result Specimen: Blood from Arm, Right Updated: 07/01/24 1901     Procalcitonin 0.09 ng/ml     Comprehensive metabolic panel [[id number]] Collected: 07/01/24 1818    Lab Status: Final result Specimen: Blood from Arm, Right Updated: 07/01/24 1859     Sodium 136 mmol/L      Potassium 3.8 mmol/L      Chloride 103 mmol/L      CO2 24 mmol/L      ANION GAP 9 mmol/L      BUN 14 mg/dL      Creatinine 0.83 mg/dL      Glucose 99 mg/dL      Calcium 9.2 mg/dL      AST 21 U/L      ALT 29 U/L      Alkaline Phosphatase 88 U/L      Total Protein 7.7 g/dL      Albumin 4.7 g/dL      Total Bilirubin 0.48 mg/dL      eGFR 94 ml/min/1.73sq m     Narrative:      National Kid[employer] Foundation guidelines for Chronic Kidney Disease (CKD):     Stage 1 with normal or high GFR (GFR > 90 mL/min/1.73 square meters)    Stage 2 Mild CKD (GFR = 60-89 mL/min/1.73 square meters)    Stage 3A Moderate CKD (GFR = 45-59 mL/min/1.73 square meters)    Stage 3B Moderate CKD (GFR = 30-44 mL/min/1.73 square meters)    Stage 4 Severe CKD (GFR = 15-29 mL/min/1.73 square meters)    Stage 5 End Stage CKD (GFR <15 mL/min/1.73 square meters)  Note: GFR calculation is accurate only with a steady state creatinine    Lactic acid, plasma (w/reflex if result > 2.0) [[id number]]  (Normal) Collected: 07/01/24 1818    Lab Status: Final result Specimen: Blood from Arm, Right Updated: 07/01/24 1859     LACTIC ACID 0.8 mmol/L     Narrative:      Result may be elevated if tourniquet was used during collection.    APTT [[id number]]  (Normal) Collected: 07/01/24 7574     Lab Status: Final result Specimen: Blood from Arm, Right Updated: 07/01/24 1845     PTT 37 seconds     Protime-INR [[id number]]  (Normal) Collected: 07/01/24 1818    Lab Status: Final result Specimen: Blood from Arm, Right Updated: 07/01/24 1845     Protime 13.6 seconds      INR 1.02    CBC and differential [[id number]]  (Abnormal) Collected: 07/01/24 1818    Lab Status: Final result Specimen: Blood from Arm, Right Updated: 07/01/24 1831     WBC 7.11 Thousand/uL      RBC 4.21 Million/uL      Hemoglobin 12.7 g/dL      Hematocrit 38.8 %      MCV 92 fL      MCH 30.2 pg      MCHC 32.7 g/dL      RDW 14.1 %      MPV 9.1 fL      Platelets 141 Thousands/uL      nRBC 0 /100 WBCs      Segmented % 81 %      Immature Grans % 0 %      Lymphocytes % 12 %      Monocytes % 7 %      Eosinophils Relative 0 %      Basophils Relative 0 %      Absolute Neutrophils 5.74 Thousands/µL      Absolute Immature Grans 0.02 Thousand/uL      Absolute Lymphocytes 0.82 Thousands/µL      Absolute Monocytes 0.50 Thousand/µL      Eosinophils Absolute 0.01 Thousand/µL      Basophils Absolute 0.02 Thousands/µL     UA w Reflex to Microscopic w Reflex to Culture [[id number]]     Lab Status: No result Specimen: Urine                    XR chest 1 view portable    (Results Pending)              Procedures  ECG 12 Lead Documentation Only    Date/Time: 7/1/2024 6:35 PM    Performed by: Isaac Crabtree PA-C  Authorized by: Isaac Crabtree PA-C    Indications / Diagnosis:  Tachycardia  ECG reviewed by me, the ED Provider: yes    Patient location:  ED  Previous ECG:     Previous ECG:  Unavailable  Interpretation:     Interpretation: non-specific    Rate:     ECG rate:  125    ECG rate assessment: tachycardic    Rhythm:     Rhythm: sinus tachycardia    Ectopy:     Ectopy: none    QRS:     QRS axis:  Normal    QRS intervals:  Normal  Conduction:     Conduction: normal    ST segments:     ST segments:  Normal  T waves:     T waves: non-specific              ED Course                               SBIRT 22yo+      Flowsheet Row Most Recent Value   Initial Alcohol Screen: US AUDIT-C     1. How often do you have a drink containing alcohol? 0 Filed at: 07/01/2024 1744   2. How many drinks containing alcohol do you have on a typical day you are drinking?  0 Filed at: 07/01/2024 1744   3b. FEMALE Any Age, or MALE 65+: How often do you have 4 or more drinks on one occassion? 0 Filed at: 07/01/2024 1744   Audit-C Score 0 Filed at: 07/01/2024 1744   DEEPTI: How many times in the past year have you...    Used an illegal drug or used a prescription medication for non-medical reasons? Never Filed at: 07/01/2024 1744                      Medical Decision Making  Several days of sore throat, pain with swallowing, headaches, myalgias, fever, right ear pain.  On arrival she is significantly tachycardic and somewhat ill-appearing although nontoxic.  Initial EKG shows sinus tachycardia.  She has noted to be febrile to 100.9.  First nurse sepsis panel was ordered-blood work is largely reassuring.  She is noted to have significant improvement in her heart rate following IV hydration and fever control.  Suspect strep tonsillitis, she has been on antibiotics for about 24 hours at this time.  Her strep swab here was negative, although possibly false negative given on antibiotics.  Mono screen obtained although felt less likely given anterior cervical lymphadenopathy, history and exam suggesting strep.  Given negative strep testing and tonsillitis however will switch to clindamycin.  Continue supportive care, plenty of fluids encouraged.  Follow-up and return indications reviewed.    Amount and/or Complexity of Data Reviewed  Labs: ordered.  Radiology: ordered.    Risk  OTC drugs.  Prescription drug management.             Disposition  Final diagnoses:   Pharyngitis   Fever   Nausea     Time reflects when diagnosis was documented in both MDM as applicable and the Disposition within this  note       Time User Action Codes Description Comment    7/1/2024  9:13 PM Isaac Crabtree Add [J02.9] Pharyngitis     7/1/2024  9:30 PM Isaac Crabtree Add [R50.9] Fever     7/1/2024  9:30 PM Isaac Crabtree Add [R11.0] Nausea           ED Disposition       ED Disposition   Discharge    Condition   Stable    Date/Time   Mon Jul 1, 2024 2113    Comment   Mela Perez discharge to home/self care.                   Follow-up Information       Follow up With Specialties Details Why Contact Info Additional Information    [facility] Emergency Department Emergency Medicine  If symptoms worsen [address] [place] [id number] [phone] [facility] Emergency Department, [address], [place], [address]    Xavi Dalton,  Family Medicine Schedule an appointment as soon as possible for a visit   [address] [place] [address] [phone]               Discharge Medication List as of 7/1/2024  9:31 PM        START taking these medications    Details   clindamycin (CLEOCIN) 300 MG capsule Take 1 capsule (300 mg total) by mouth 4 (four) times a day for 10 days, Starting Mon 7/1/2024, Until Thu 7/11/2024, Normal      ondansetron (ZOFRAN-ODT) 4 mg disintegrating tablet Take 1 tablet (4 mg total) by mouth every 8 (eight) hours as needed for nausea or vomiting, Starting Mon 7/1/2024, Normal           CONTINUE these medications which have NOT CHANGED    Details   acetaminophen (TYLENOL) 325 mg tablet Take 2 tablets (650 mg total) by mouth every 6 (six) hours as needed for mild pain, Starting Sat 4/6/2024, Normal      clotrimazole-betamethasone (LOTRISONE) 1-0.05 % cream Apply topically 2 (two) times a day for 7 days, Starting Wed 5/1/2024, Until Thu 5/9/2024, Normal      ferrous sulfate 324 (65 Fe) mg Take 1 tablet (324 mg total) by mouth every other day, Starting Mon 2/19/2024, Normal      ibuprofen (MOTRIN) 600 mg tablet  Take 1 tablet (600 mg total) by mouth every 6 (six) hours as needed for moderate pain, Starting Sat 4/6/2024, Normal      Prenatal Vit-Fe Fumarate-FA (PRENATAL 1+1 PO) Take 1 tablet by mouth, Historical Med           STOP taking these medications       amoxicillin (AMOXIL) 500 mg capsule Comments:   Reason for Stopping:               No discharge procedures on file.    PDMP Review       None            ED Provider  Electronically Signed by             Isaac Crabtree PA-C  07/01/24 2158

## 2024-07-02 NOTE — DISCHARGE INSTRUCTIONS
Please refer to the attached information for strict return instructions. If symptoms worsen or new symptoms develop please return to the ER. Drink plenty of fluids to stay hydrated. Use prescribed antibiotic for full course.

## 2024-07-06 LAB — BACTERIA BLD CULT: NORMAL

## 2024-07-07 LAB — BACTERIA BLD CULT: NORMAL

## 2024-08-13 DIAGNOSIS — Z98.891 S/P CESAREAN SECTION: ICD-10-CM

## 2024-08-13 DIAGNOSIS — R52 POSTPARTUM PAIN: Primary | ICD-10-CM

## 2024-08-16 ENCOUNTER — NURSE TRIAGE (OUTPATIENT)
Age: 29
End: 2024-08-16

## 2024-08-16 NOTE — TELEPHONE ENCOUNTER
Received call from Ana M with [facility]. States patient was seen in office today with baby and scored an 11 on the [place]  Depression Scale. States that when asked about thoughts of harming self, pt answered hardly ever. States pt was seen this past week by her PCP and started on lexapro.     Call to pt. Left non-detailed voicemail message for patient to return our call.

## 2024-08-30 ENCOUNTER — EVALUATION (OUTPATIENT)
Dept: PHYSICAL THERAPY | Facility: REHABILITATION | Age: 29
End: 2024-08-30
Payer: COMMERCIAL

## 2024-08-30 DIAGNOSIS — R52 POSTPARTUM PAIN: ICD-10-CM

## 2024-08-30 DIAGNOSIS — Z98.891 S/P CESAREAN SECTION: ICD-10-CM

## 2024-08-30 PROCEDURE — 97162 PT EVAL MOD COMPLEX 30 MIN: CPT

## 2024-08-30 PROCEDURE — 97530 THERAPEUTIC ACTIVITIES: CPT

## 2024-08-30 NOTE — PROGRESS NOTES
PT Evaluation     Today's Date: 2024  Patient name: Mela Perez  : 1995  MRN: [id number]  Referring provider: Brandy Eugene MD  Dx:  Encounter Diagnosis     ICD-10-CM    1. Postpartum pain  O90.89 Ambulatory Referral to Physical Therapy    R52       2. S/P  section  Z98.891 Ambulatory Referral to Physical Therapy                        Assessment  Impairments: abnormal coordination, abnormal muscle firing, abnormal muscle tone, abnormal or restricted ROM and activity intolerance  Symptom irritability: moderate    Assessment details:    Mela Perez is a 28 y.o. year old female presenting to outpatient pelvic health physical therapy with a referral from provider for Postpartum pain S/P  section. Patient is  4 months postpartum  delivery on 2024. Patient reports pelvic pain/discomfort beginning post delivery. Patient's chief complaint is abdominal weakness/discomfort, and pelvic floor discomfort.  Internal Assessment performed today with patient verbal and written consent. Patient was provided education on the purpose and techniques of the internal examination. Offered patient another individual in the room, of which the patient denied. Educated the patient that the exam can be terminated at any time, of which, the patient did not request.  Examination reveals reduced diaphragmatic breathing, delayed TrA (transverse abdominis) engagement, reduced pelvic floor and TrA synchrony, reduced perineal mobility caudally, and reduced perineal mobility cephalad.  Patient will benefit from skilled PT services to address these deficits and improve function. Based on patients age and motivation patient is a positive candidate to respond favorably to physical therapy with a good prognosis.  Patient was educated on examination and techniques, plan of care, goals of therapy, and HEP. Patient was provided an opportunity to ask any questions. Provided Pt  initial HEP. Patient demonstrated and verbalized understanding. Verbally reported to hold exercises if increased pain or discomfort.  Pt will be seen 1-2x/week for 6-12 weeks. Patient will be re-assessed regularly in order to document progress and limit any regression. The goal of PT is to progress patient towards independence of self care management.     Understanding of Dx/Px/POC: good     Prognosis: good    Goals  Dysfunction:   In 4 weeks, patient will demonstrate improved PFM strength to at least 3/5.  In 6 weeks, patient will demonstrate improved PFM endurance to 6 sec of 3/5 strength or greater.   In 8 weeks, patient will demonstrate improved PFM relaxation to at least 75% or greater.       Pain:   In 6 weeks, patient will tolerate painfree intravaginal penetration, specifically deep penetration.  In 8 weeks, patient will report <3/10 pain with penetration both deep and post.      Bowel:  In 4 weeks, patient will report improvement in defecatory function as evidenced by 1 bowel movement per day without straining.    Function:  In 4 weeks, patient will report a 50% reduction in lower abdomen pressure   In 4 weeks, patient will report a 25% reduction in bulging discomfort, to assist with ambulation tolerance.   In 6 weeks, patient will report a 100% compliance with appropriate defecation mechanics and will report 75% reduction in straining/constipation  In 6 weeks, patient will report a 50% reduction in urgency to utilize the restroom  In 8 weeks, patient will report a >20% improvement in PFDI to improve function      Plan  Patient would benefit from: skilled physical therapy  Planned modality interventions: biofeedback and TENS    Planned therapy interventions: manual therapy, motor coordination training, neuromuscular re-education, patient/caregiver education, postural training, strengthening, stretching, therapeutic activities, therapeutic exercise and flexibility    Frequency: 1x week  Duration in weeks:  "12  Plan of Care beginning date: 2024  Plan of Care expiration date: 2024  Treatment plan discussed with: patient    Subjective      Chief Complaint: Patient reports when she is changing positions and turning over in bed she notes abdominal discomfort. In addition, with ambulation \"my uterus is faling out.\" Patient is post  delivery 2024. Patient returned back to work at 14 weeks. Pain onset since delivery, with minimal regression in symptoms. Patient is not currently breastfeeding at this time.     HPI: Pt presents to PT with reports of abdominal discomfort,  scar discomfort, and pelvic weakness.   Occupation: [employer] .   Social History: Lives at home with three children.   Preferred language/communication style:  English.  PMH:         Specific symptom history:    Urinary:     Patient denies any urinary symptoms at thi time.     Water 2 stanleys a day.    Coffee 1 Cup a day              Bowel:     Defecates 1x/day .  Reports type: Type 5/6.  Constipation/Straining?: Yes  Use of Squatty Potty?: No     Nutrition:   Pasta/Bread/Rice  Patient does consume vegetables.      GYN:      with  delivery and vaginal deliveries. Noted tearing requiring stitching with vagina delivery.   Sees GYN regularly? Yes  Time spent actively pushing: 3 hours and five minutes with second son.   Menstruation: Yes   Sexual Function:     Currently sexually active? Yes  Sexual dysfunction? Yes - deeper penetration and afterwards.   Number of partners: .   History of sexual trauma/abuse? No   MSK:      History of abdominal surgery?  delivery  Current exercise: Ambulation - return to work is impacting time for exercise.                        Systems Review History:  Cardiovascular/pulmonary  Patient denies   Integumentary    Musculoskeletal Location: Pelvic Pain   Duration: 30 minutes. Reduced with changing positions.   Location: Lower abdomen.  Pain level best: 6/10      " "Worst: 10/10  Aggravating Factors: Penetration, with side-lying.    Neuromuscular System Any recent falls? no  Numbness/tingling?  no     Surgical history and/or abdominal surgeries:  Patient denies.        Goal: Reduce pelvic pain.          Objective       Abdominal Assessment:      Abdominal Assessment: Breathing Mechanics:  Reduced diaphragmatic breathing - tactile cueing utilize to assist with abdominal expansion on the inhale. Mod # of cueing required    Core engagement:  Delayed TrA engagement in hooklying    Abdominal Assessment:  TTP along inferior quadrant noting 4/10 pain medial, center, and lateral.  TTP center pain along superior middle quadrant.     Diastatis   Diastasis recti present? no  3\" above umbilicus (# fingers): 0  Umbilicus (# fingers): 0  3\" below umbilicus (# fingers): 0  Connective tissue integrity at linea alba: firm  no tenderness at linea alba  unable to engage transverse abdominis     Skin inspection:   scars present.   Number of scars: 1  Scar 1 location: Transverse  Incision. Sensitivity level medium Restriction level low       General Perineum Exam:   Positive for gaping introitus.     Visual Inspection of Perineum:   Excursion of perineal body in cephalad direction with contraction of pelvic floor muscles (PFM): weak  Excursion of perineal body in caudal direction with relaxation of pelvic floor muscles (PFM): unable  Cotton swab test: non-tender  Sphincter Tone Squeeze: normal  Sensation: intact  Tenderness: unprovoked  Perineal body inspection: bulging        Pelvic Floor Muscle Exam:     Muscle Contraction: Noted PFM descent prior to concentric engagement.   Breathing pattern with contraction: apical   Pelvic floor muscle relaxation is incomplete.   50% pelvic floor relaxation   4 seconds required for complete relaxation.        PERFECT Score   Power right: 1+/5   Power left: 2/5   Endurance (seconds to max): 3   Repetitions (before fatigue): 3    Perfect Score: Internal " Assessment Performed today with patient verbal and written consent. Patient was provided education on the purpose and techniques of the internal examination. Offered patient another individual in the room, of which the patient denied. Educated the patient that the exam can be terminated at any time, of which, the patient did not request.      Objective internal assessment: Received verbal consent from patient prior to insertion of gloved finger into the introitus. Insertion of finger toward DIP making note of introitus mobility, noting WNL posterior and lateral assessment from right side. Noted TTP along medial mobility. Gentle sweep along layer one of pelvic from right to left noted WNL sensation, and WNL tenderness. Patient tolerated deeper insertion toward IP. Left PFM assessment noted 2+/5 MMT. Verbal cueing for motor coordination between PFM and breathing mechanics. Right PFM: 1+/5. Noted high tone right PFM resting tone. Verbal cueing to assist with eccentric movement. Examination terminated with instruction to patient that I was going to remove my gloved finger.     Overall: Progress eccentric lengthening to assist with improving PFM strength, motor coordination with PFM and breathing mechanics.            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, antepartum    Obesity affecting pregnancy    Pap smear abnormality of cervix with LGSIL    Prior pregnancy complicated by PIH, antepartum, third trimester    Status post primary low transverse  section    Thrombocytopenia affecting pregnancy (HCC)    Excessive fetal growth affecting management of mother in third trimester, antepartum    Breech presentation of fetus    Sterilization    Fungal skin infection    Postpartum care and examination       Chief Complaint     Patient Subjective includes:  constipation, straining, and dyspareunia    Patient Goals        Objective Impairments to address   Pelvic Floor Strength    Power Right: 1+/5  Left: 2+/5  Endurance: 2 seconds     Endurance     Fast Twitch     % relaxation    External Hip Strength        Lumbar Screen          PLAN OF CARE EXPIRATION: 11/30/2024    AUTHORIZATION EXPIRATION:     Date Authorization     Number of visits provided            Date of Service 8/30/2024        Visits Used         Visits Remaining         [employer] Created                  Neuro Re-Ed         Urge deferral         Defecation mechanics         Breathing Mechanics         PFM Coordination         PFM Down Training         Internal Cueing                   Ther Ex         PFM Strengthening         Hip strengthening         Functional Strengthening         Abdominal Strengthening         Dilator Training         Aerobic         Therapeutic Rest Breaks         Mobility          High Impact         UE Strengthening          Breathing Mechanics Hooklying diaphragmatic  1 x 10         Ther Activity         Voiding Diaries         Review of sxs         Fluid Intake         Education POC, HEP, internal assessment with external pelvic model, defecation mechanics. Demonstrated and patient demonstrated and verbalized understanding. Provided handout. Educated on reducing s/s of prolapse feeling.         Manual Ther         PFM exam         Ortho exam         Dynamometer Testing         Fascial Decompression                  Modalities                           Outcome Measure

## (undated) DEVICE — WOUND RETRACTOR AND PROTECTOR: Brand: ALEXIS O WOUND PROTECTOR-RETRACTOR

## (undated) DEVICE — CHLORAPREP HI-LITE 26ML ORANGE

## (undated) DEVICE — GLOVE PI ULTRA TOUCH SZ.6.5

## (undated) DEVICE — SUT VICRYL 0 CT 36 IN J958H

## (undated) DEVICE — ENSEAL X1 TISSUE SEALER, CURVED JAW, 25 CM SHAFT LENGTH: Brand: ENSEAL

## (undated) DEVICE — MEDI-VAC YANKAUER SUCTION HANDLE W/STRAIGHT TIP & CONTROL VENT: Brand: CARDINAL HEALTH

## (undated) DEVICE — GLOVE INDICATOR PI UNDERGLOVE SZ 6.5 BLUE

## (undated) DEVICE — SUT MONOCRYL 4-0 PS-2 27 IN Y426H

## (undated) DEVICE — SUT STRATAFIX 2-0 RB-1 20CM SXMD1B404

## (undated) DEVICE — KIT,BETHLEHEM C SECT DELIVERY: Brand: CARDINAL HEALTH

## (undated) DEVICE — DRESSING TELFA 2 X 3 IN STRL

## (undated) DEVICE — SUT VICRYL 0 CTX 36 IN J978H

## (undated) DEVICE — ADHESIVE SKIN HIGH VISCOSITY EXOFIN 1ML